# Patient Record
Sex: MALE | Race: WHITE | NOT HISPANIC OR LATINO | Employment: OTHER | RURAL
[De-identification: names, ages, dates, MRNs, and addresses within clinical notes are randomized per-mention and may not be internally consistent; named-entity substitution may affect disease eponyms.]

---

## 2020-06-17 ENCOUNTER — OFFICE VISIT (OUTPATIENT)
Dept: FAMILY MEDICINE CLINIC | Facility: CLINIC | Age: 70
End: 2020-06-17

## 2020-06-17 VITALS
OXYGEN SATURATION: 97 % | WEIGHT: 179.6 LBS | SYSTOLIC BLOOD PRESSURE: 178 MMHG | TEMPERATURE: 98.4 F | HEIGHT: 72 IN | DIASTOLIC BLOOD PRESSURE: 96 MMHG | RESPIRATION RATE: 18 BRPM | HEART RATE: 71 BPM | BODY MASS INDEX: 24.33 KG/M2

## 2020-06-17 DIAGNOSIS — E78.2 MIXED HYPERLIPIDEMIA: ICD-10-CM

## 2020-06-17 DIAGNOSIS — I10 ESSENTIAL HYPERTENSION: Primary | ICD-10-CM

## 2020-06-17 PROBLEM — E66.3 OVERWEIGHT: Status: RESOLVED | Noted: 2018-11-20 | Resolved: 2020-06-17

## 2020-06-17 PROBLEM — E78.5 HYPERLIPIDEMIA: Status: ACTIVE | Noted: 2019-02-27

## 2020-06-17 PROBLEM — J30.9 ALLERGIC RHINITIS: Status: ACTIVE | Noted: 2020-06-17

## 2020-06-17 PROBLEM — N41.1 PROSTATITIS, CHRONIC: Status: ACTIVE | Noted: 2020-06-17

## 2020-06-17 PROBLEM — E66.3 OVERWEIGHT: Status: ACTIVE | Noted: 2018-11-20

## 2020-06-17 PROCEDURE — 99213 OFFICE O/P EST LOW 20 MIN: CPT | Performed by: FAMILY MEDICINE

## 2020-06-17 RX ORDER — ATORVASTATIN CALCIUM 40 MG/1
TABLET, FILM COATED ORAL EVERY 24 HOURS
COMMUNITY
Start: 2019-03-15 | End: 2020-06-21

## 2020-06-17 RX ORDER — ATENOLOL 25 MG/1
TABLET ORAL
Qty: 45 TABLET | Refills: 1 | Status: SHIPPED | OUTPATIENT
Start: 2020-06-17 | End: 2020-12-21

## 2020-06-17 RX ORDER — ATENOLOL 25 MG/1
TABLET ORAL
COMMUNITY
Start: 2014-05-29 | End: 2020-06-17 | Stop reason: SDUPTHER

## 2020-06-17 NOTE — PROGRESS NOTES
Subjective   Emanuel Alcantara is a 69 y.o. male.     Chief Complaint   Patient presents with   • Hypertension   • Hyperlipidemia       Hypertension   This is a chronic problem. The current episode started more than 1 year ago. The problem is uncontrolled. Associated symptoms include chest pain. Pertinent negatives include no anxiety, neck pain, palpitations or shortness of breath. Risk factors for coronary artery disease include family history, dyslipidemia and male gender. There are no compliance problems.    Hyperlipidemia   This is a chronic problem. The current episode started more than 1 year ago. He has no history of diabetes, hypothyroidism or obesity. Associated symptoms include chest pain. Pertinent negatives include no leg pain or shortness of breath. Risk factors for coronary artery disease include dyslipidemia, hypertension and male sex.            I personally reviewed and updated the patient's allergies, medications, problem list, and past medical, surgical, social, and family history.     Family History   Problem Relation Age of Onset   • Hypertension Mother    • Osteoporosis Mother    • Glaucoma Mother    • Macular degeneration Mother    • Heart failure Father    • Hypertension Father    • Obesity Father    • Hypertension Sister    • Stomach cancer Maternal Grandfather    • Diabetes Paternal Grandmother    • Pancreatic cancer Paternal Grandmother    • Aneurysm Paternal Grandfather        Social History     Tobacco Use   • Smoking status: Never Smoker   • Smokeless tobacco: Never Used   Substance Use Topics   • Alcohol use: Yes   • Drug use: Never       History reviewed. No pertinent surgical history.    Patient Active Problem List   Diagnosis   • Allergic rhinitis   • Benign prostatic hyperplasia   • Hyperlipidemia   • Hypertension   • Osteoarthritis   • Prostatitis, chronic   • Encounter for screening for malignant neoplasm of prostate         Current Outpatient Medications:   •  atenolol (TENORMIN)  "25 MG tablet, Take 1/2 tablet daily, Disp: 45 tablet, Rfl: 1  •  atorvastatin (LIPITOR) 40 MG tablet, Daily., Disp: , Rfl:          Review of Systems   Constitutional: Negative for chills and diaphoresis.   Eyes: Negative for visual disturbance.   Respiratory: Negative for shortness of breath.    Cardiovascular: Positive for chest pain. Negative for palpitations.   Gastrointestinal: Negative for abdominal pain and nausea.   Endocrine: Negative for polydipsia and polyphagia.   Musculoskeletal: Negative for neck pain and neck stiffness.   Skin: Negative for color change and pallor.   Neurological: Negative for seizures and syncope.   Hematological: Negative for adenopathy.       I have reviewed and confirmed the accuracy of the ROS as documented by the MA/LPN/RN Monty Henry MD      Objective   /96 (BP Location: Right arm, Patient Position: Sitting, Cuff Size: Adult)   Pulse 71   Temp 98.4 °F (36.9 °C)   Resp 18   Ht 182.9 cm (72\")   Wt 81.5 kg (179 lb 9.6 oz)   SpO2 97%   BMI 24.36 kg/m²   BP Readings from Last 3 Encounters:   06/17/20 178/96   03/15/19 164/97   11/20/18 163/93     Wt Readings from Last 3 Encounters:   06/17/20 81.5 kg (179 lb 9.6 oz)   03/15/19 82.9 kg (182 lb 12.8 oz)   11/20/18 84.8 kg (187 lb)     Physical Exam   Constitutional: He is oriented to person, place, and time. He appears well-developed and well-nourished.   Cardiovascular: Normal rate, regular rhythm, S1 normal, S2 normal, normal heart sounds, intact distal pulses and normal pulses. Exam reveals no gallop and no friction rub.   No murmur heard.  Pulmonary/Chest: Effort normal and breath sounds normal. No accessory muscle usage or stridor. He has no decreased breath sounds. He has no wheezes. He has no rhonchi. He has no rales.   Abdominal: Soft. Normal appearance, normal aorta and bowel sounds are normal. He exhibits no distension, no pulsatile midline mass and no mass. There is no hepatosplenomegaly. There is no " tenderness. There is no rigidity, no rebound, no guarding, no CVA tenderness and negative Castillo's sign. No hernia.   Neurological: He is alert and oriented to person, place, and time. Coordination and gait normal.   Skin: Skin is warm and dry. Turgor is normal. He is not diaphoretic. No pallor.         Assessment/Plan      Medications        Problem List         LOS    Hypertension.  Home values reviewed, has been doing well on atenolol 12.5 mg daily, continue Rx.  Palpitations.  History of, much improved on atenolol.  Limit caffeine.  Hyperlipidemia.  He stopped statin.  Discussed diet and exercise colitis to modification.  Recheck fasting labs.  Direct inguinal hernia.  Small/nontender, asymptomatic currently.  Continue to monitor clinically.  Consider surgery referral if worsening symptoms.  Asymmetric abdominal fat.  He is concerned about a prominence of his abdominal wall which is a symmetric.  Benign exam today, asymptomatic.  Reassurance given.  Has had CT abdomen will get records.  Follow-up recheck.  Consider repeat imaging, surgery referral if worsening symptoms.      Problem List Items Addressed This Visit        Unprioritized    Hyperlipidemia    Relevant Medications    atorvastatin (LIPITOR) 40 MG tablet    Hypertension - Primary    Relevant Medications    atenolol (TENORMIN) 25 MG tablet              Expected course, medications, and adverse effects discussed.  Call or return if worsening or persistent symptoms.

## 2020-09-15 ENCOUNTER — TELEPHONE (OUTPATIENT)
Dept: FAMILY MEDICINE CLINIC | Facility: CLINIC | Age: 70
End: 2020-09-15

## 2020-09-15 ENCOUNTER — OFFICE VISIT (OUTPATIENT)
Dept: FAMILY MEDICINE CLINIC | Facility: CLINIC | Age: 70
End: 2020-09-15

## 2020-09-15 VITALS
TEMPERATURE: 97.3 F | RESPIRATION RATE: 18 BRPM | BODY MASS INDEX: 24.95 KG/M2 | DIASTOLIC BLOOD PRESSURE: 89 MMHG | HEIGHT: 72 IN | WEIGHT: 184.2 LBS | OXYGEN SATURATION: 98 % | HEART RATE: 77 BPM | SYSTOLIC BLOOD PRESSURE: 153 MMHG

## 2020-09-15 DIAGNOSIS — N48.1 BALANITIS: ICD-10-CM

## 2020-09-15 DIAGNOSIS — K40.90 DIRECT INGUINAL HERNIA: ICD-10-CM

## 2020-09-15 DIAGNOSIS — N40.1 BENIGN PROSTATIC HYPERPLASIA WITH URINARY FREQUENCY: ICD-10-CM

## 2020-09-15 DIAGNOSIS — E78.2 MIXED HYPERLIPIDEMIA: ICD-10-CM

## 2020-09-15 DIAGNOSIS — I10 ESSENTIAL HYPERTENSION: ICD-10-CM

## 2020-09-15 DIAGNOSIS — Z00.00 MEDICARE ANNUAL WELLNESS VISIT, SUBSEQUENT: Primary | ICD-10-CM

## 2020-09-15 DIAGNOSIS — R35.0 BENIGN PROSTATIC HYPERPLASIA WITH URINARY FREQUENCY: ICD-10-CM

## 2020-09-15 PROCEDURE — G0439 PPPS, SUBSEQ VISIT: HCPCS | Performed by: FAMILY MEDICINE

## 2020-09-15 PROCEDURE — 99214 OFFICE O/P EST MOD 30 MIN: CPT | Performed by: FAMILY MEDICINE

## 2020-09-15 RX ORDER — CLOTRIMAZOLE AND BETAMETHASONE DIPROPIONATE 10; .64 MG/G; MG/G
CREAM TOPICAL
Qty: 45 G | Refills: 5 | Status: ON HOLD | OUTPATIENT
Start: 2020-09-15 | End: 2021-12-07

## 2020-09-15 RX ORDER — METRONIDAZOLE 10 MG/G
GEL TOPICAL 2 TIMES DAILY
Qty: 60 G | Refills: 5 | Status: SHIPPED | OUTPATIENT
Start: 2020-09-15 | End: 2020-10-15

## 2020-09-15 RX ORDER — ROSUVASTATIN CALCIUM 5 MG/1
5 TABLET, COATED ORAL DAILY
Qty: 90 TABLET | Refills: 3 | Status: SHIPPED | OUTPATIENT
Start: 2020-09-15 | End: 2021-03-16

## 2020-09-15 NOTE — TELEPHONE ENCOUNTER
Patient states the pharmacy informed him the metrogel is not covered under his insurance. Can this be changed?

## 2020-09-15 NOTE — PROGRESS NOTES
Subjective   Emanuel Alcantara is a 69 y.o. male.     Chief Complaint   Patient presents with   • Medicare Wellness-subsequent   • Hyperlipidemia   • Hypertension       The patient is here: for coordination of medical care to discuss health maintenance and disease prevention to follow up on multiple medical problems.  Last comprehensive physical was on 2018.  Patient's previous physician was [facility].  Overall has: moderate activity with work/home activities, exercises 2 - 3 times per week, good appetite, feels well with no complaints, good energy level and is sleeping well. Nutrition: appropriate balanced diet, balanced diet and eating a variety of foods. Last tetanus shot was 10/27/2016. Patient's last colonoscopy was: 3/17/2005. Patients last stress test 11/7/2001. Patients last echo 12/11/2001.    History of Present Illness     Recent Hospitalizations:  No hospitalization(s) within the last year..  ccc    I personally reviewed and updated the patient's allergies, medications, problem list, and past medical, surgical, social, and family history. I have reviewed and confirmed the accuracy of the HPI and ROS as documented by the MA/LPN/RN Monty Henry MD      Family History   Problem Relation Age of Onset   • Hypertension Mother    • Osteoporosis Mother    • Glaucoma Mother    • Macular degeneration Mother    • Heart failure Father    • Hypertension Father    • Obesity Father    • Hypertension Sister    • Stomach cancer Maternal Grandfather    • Diabetes Paternal Grandmother    • Pancreatic cancer Paternal Grandmother    • Aneurysm Paternal Grandfather        Social History     Tobacco Use   • Smoking status: Never Smoker   • Smokeless tobacco: Never Used   Substance Use Topics   • Alcohol use: Yes   • Drug use: Never       History reviewed. No pertinent surgical history.    Patient Active Problem List   Diagnosis   • Allergic rhinitis   • Benign prostatic hyperplasia   • Hyperlipidemia   • Hypertension   •  "Osteoarthritis   • Prostatitis, chronic   • Encounter for screening for malignant neoplasm of prostate   • Balanitis   • Direct inguinal hernia         Current Outpatient Medications:   •  atenolol (TENORMIN) 25 MG tablet, Take 1/2 tablet daily, Disp: 45 tablet, Rfl: 1  •  clotrimazole-betamethasone (Lotrisone) 1-0.05 % cream, Apply topically twice daily as needed, Disp: 45 g, Rfl: 5  •  metroNIDAZOLE (Metrogel) 1 % gel, Apply  topically to the appropriate area as directed 2 (Two) Times a Day for 30 days. Avoid contact with eyes., Disp: 60 g, Rfl: 5  •  rosuvastatin (Crestor) 5 MG tablet, Take 1 tablet by mouth Daily., Disp: 90 tablet, Rfl: 3         Review of Systems   Constitutional: Negative for chills and diaphoresis.   HENT: Negative for trouble swallowing and voice change.    Eyes: Negative for visual disturbance.   Respiratory: Negative for shortness of breath.    Cardiovascular: Negative for chest pain and palpitations.   Gastrointestinal: Negative for abdominal pain and nausea.   Endocrine: Negative for polydipsia and polyphagia.   Genitourinary: Negative for hematuria.   Musculoskeletal: Negative for neck stiffness.   Skin: Negative for color change and pallor.   Allergic/Immunologic: Negative for immunocompromised state.   Neurological: Negative for seizures and syncope.   Hematological: Negative for adenopathy.   Psychiatric/Behavioral: Negative for hallucinations, sleep disturbance and suicidal ideas.       I have reviewed and confirmed the accuracy of the ROS as documented by the MA/LPN/RN Monty Henry MD      Objective   /89 (BP Location: Right arm, Patient Position: Sitting, Cuff Size: Adult)   Pulse 77   Temp 97.3 °F (36.3 °C)   Resp 18   Ht 182.9 cm (72\")   Wt 83.6 kg (184 lb 3.2 oz)   SpO2 98%   BMI 24.98 kg/m²   BP Readings from Last 3 Encounters:   09/15/20 153/89   06/17/20 178/96   03/15/19 164/97     Wt Readings from Last 3 Encounters:   09/15/20 83.6 kg (184 lb 3.2 oz) "   06/17/20 81.5 kg (179 lb 9.6 oz)   03/15/19 82.9 kg (182 lb 12.8 oz)     Physical Exam  Constitutional:       Appearance: He is well-developed. He is not diaphoretic.   HENT:      Head: Normocephalic.      Right Ear: Tympanic membrane, ear canal and external ear normal.      Left Ear: Tympanic membrane, ear canal and external ear normal.      Nose: Nose normal.   Eyes:      General: Lids are normal.      Conjunctiva/sclera: Conjunctivae normal.      Pupils: Pupils are equal, round, and reactive to light.   Neck:      Thyroid: No thyromegaly.      Vascular: No carotid bruit or JVD.      Trachea: No tracheal deviation.   Cardiovascular:      Rate and Rhythm: Normal rate and regular rhythm.      Heart sounds: Normal heart sounds. No murmur. No friction rub. No gallop.    Pulmonary:      Effort: Pulmonary effort is normal.      Breath sounds: Normal breath sounds. No stridor. No decreased breath sounds, wheezing or rales.   Abdominal:      General: Bowel sounds are normal. There is no distension.      Palpations: Abdomen is soft. There is no mass.      Tenderness: There is no abdominal tenderness. There is no guarding or rebound.      Hernia: No hernia is present. There is no hernia in the left inguinal area.   Genitourinary:     Penis: Normal.       Scrotum/Testes: Normal.         Right: Mass, tenderness or swelling not present.         Left: Mass, tenderness or swelling not present.      Comments: Positive mild balanitis  Lymphadenopathy:      Head:      Right side of head: No submental, submandibular, tonsillar, preauricular, posterior auricular or occipital adenopathy.      Left side of head: No submental, submandibular, tonsillar, preauricular, posterior auricular or occipital adenopathy.      Cervical: No cervical adenopathy.      Lower Body: No right inguinal adenopathy. No left inguinal adenopathy.   Skin:     General: Skin is warm and dry.      Coloration: Skin is not pale.   Neurological:      Mental Status:  He is alert and oriented to person, place, and time.      Cranial Nerves: No cranial nerve deficit.      Sensory: No sensory deficit.      Coordination: Coordination normal.      Gait: Gait normal.      Deep Tendon Reflexes: Reflexes are normal and symmetric.         Recent Lab Results:    Lab Results   Component Value Date    GLU 92 09/01/2020        Lab Results   Component Value Date    CHOL 232 (H) 03/08/2019    CHOL 227 (H) 11/26/2018    CHOL 214 (H) 06/17/2016    CHLPL 226 (H) 09/01/2020     Lab Results   Component Value Date    TRIG 76 09/01/2020    TRIG 82 03/08/2019    TRIG 95 11/26/2018     Lab Results   Component Value Date    HDL 47 09/01/2020    HDL 39 (L) 03/08/2019    HDL 45 11/26/2018     Lab Results   Component Value Date     (H) 09/01/2020     MG/DL (CALC) (H) 03/08/2019     MG/DL (CALC) (H) 11/26/2018     Lab Results   Component Value Date    PSA 1.2 09/01/2020    PSA 1.0 11/26/2018    PSA 0.9 06/17/2016     Lab Results   Component Value Date    WBC 6.1 09/01/2020    HGB 14.2 09/01/2020    HCT 42.5 09/01/2020    MCV 90 09/01/2020     09/01/2020     Lab Results   Component Value Date    TSH 4.520 (H) 09/01/2020     Lab Results   Component Value Date    GLUCOSE 93 11/26/2018    BUN 13 09/01/2020    CREATININE 0.98 09/01/2020    EGFRIFNONA 79 09/01/2020    EGFRIFAFRI 92 09/01/2020    BCR 13 09/01/2020    K 4.3 09/01/2020    CO2 24 09/01/2020    CALCIUM 9.6 09/01/2020    PROTENTOTREF 7.4 09/01/2020    ALBUMIN 4.6 09/01/2020    LABIL2 1.6 09/01/2020    AST 31 09/01/2020    ALT 23 09/01/2020         Age-appropriate Screening Schedule:  Refer to the list below for future screening recommendations based on patient's age, sex and/or medical conditions. Orders for these recommended tests are listed in the plan section. The patient has been provided with a written plan.    Health Maintenance   Topic Date Due   • ZOSTER VACCINE (1 of 2) 12/10/2002   • INFLUENZA VACCINE  08/01/2020    • LIPID PANEL  09/01/2021   • COLONOSCOPY  01/01/2025   • TDAP/TD VACCINES (3 - Td) 10/27/2026       Depression Screen:   PHQ-2/PHQ-9 Depression Screening 9/15/2020   Little interest or pleasure in doing things 0   Feeling down, depressed, or hopeless 0   Trouble falling or staying asleep, or sleeping too much 0   Feeling tired or having little energy 0   Poor appetite or overeating 0   Feeling bad about yourself - or that you are a failure or have let yourself or your family down 0   Trouble concentrating on things, such as reading the newspaper or watching television 0   Moving or speaking so slowly that other people could have noticed. Or the opposite - being so fidgety or restless that you have been moving around a lot more than usual 0   Thoughts that you would be better off dead, or of hurting yourself in some way 0   Total Score 0   If you checked off any problems, how difficult have these problems made it for you to do your work, take care of things at home, or get along with other people? Not difficult at all     I spent more than 16 minutes asking patient questions, counseling and documenting in the chart.    Health Habits and Functional and Cognitive Screening:  Functional & Cognitive Status 9/15/2020   Do you have difficulty preparing food and eating? No   Do you have difficulty bathing yourself, getting dressed or grooming yourself? No   Do you have difficulty using the toilet? No   Do you have difficulty moving around from place to place? No   Do you have trouble with steps or getting out of a bed or a chair? No   Current Diet Well Balanced Diet   Dental Exam Up to date   Eye Exam Not up to date   Exercise (times per week) 5 times per week   Current Exercise Activities Include Walking   Do you need help using the phone?  No   Are you deaf or do you have serious difficulty hearing?  No   Do you need help with transportation? No   Do you need help shopping? No   Do you need help preparing meals?  No    Do you need help with housework?  No   Do you need help with laundry? No   Do you need help taking your medications? No   Do you need help managing money? No   Do you ever drive or ride in a car without wearing a seat belt? No   Have you felt unusual stress, anger or loneliness in the last month? No   Who do you live with? Spouse   If you need help, do you have trouble finding someone available to you? No   Have you been bothered in the last four weeks by sexual problems? No   Do you have difficulty concentrating, remembering or making decisions? No       Does the patient have evidence of cognitive impairment? No    Advance Care Planning:  ACP discussion was held with the patient during this visit. Patient does not have an advance directive, information provided.     A face-to-face visit was completed today with patient.  Counseling explanation, and discussion of advanced directives was performed.   The last advanced care visit was performed in 2018.  In a near life ending situation, from which he is not expected to recover functionally, and he is not able to speak for him, he does not want life sustaining measures. We discussed feeding tubes, ventilators and cardiac support as well as dialysis.    I spent more than 16 minutes discussing Advanced Care Planning information and documenting in the chart.    Identification of Risk Factors:  Risk factors include: Advance Directive Discussion  Cardiovascular risk  Colon Cancer Screening  Immunizations Discussed/Encouraged (specific immunizations; Prevnar ).    Compared to one year ago, the patient feels his physical health is the same.  Compared to one year ago, the patient feels his mental health is the same.    Patient Self-Management and Personalized Health Advice  New so the role that the patient has been provided with information about: diet, exercise, prevention of cardiac or vascular disease, designing advance directives and supplements and preventive services  including:   · Alcohol Misuse Screening and Counseling  (15 minutes counseling time, Code )  · Annual Wellness Visit (AWV)  · Colorectal Cancer Screening, Colonoscopy  · Depression Screening (15 minutes face to face, Code )  · Influenza Vaccine and Administration  · Pneumococcal Vaccine and Administration.      Assessment/Plan      Medications        Problem List         LOS    Medicare wellness.  Doing well.  Recommend 23 valent Pneumovax he states he will consider.  Coated baby aspirin daily.  Discussed health maintenance, screening test, lifestyle modification.  Hypertension.    Good control.   Home values reviewed, has been doing well on atenolol 12.5 mg daily, continue Rx.  Remote history of benign cardiac stress testing, recommend repeat stress echo, carotid Dopplers he states he will consider.    Palpitations.  History of, much improved on atenolol.  Limit caffeine.  Hyperlipidemia.  He stopped statin.  Discussed diet and exercise colitis to modification. , recommend start Crestor 5 mg daily, he states he will consider, recheck blood work 6 months.  Discussed diet, exercise, lifestyle modification.   Direct inguinal hernia.  Small/nontender, asymptomatic currently.  Continue to monitor clinically.  Consider surgery referral if worsening symptoms.  Asymmetric abdominal fat.  He is concerned about a prominence of his abdominal wall which is a symmetric.  Benign exam today, asymptomatic.  Reassurance given.  Has had CT abdomen will get records.  Follow-up recheck.  Consider repeat imaging, surgery referral if worsening symptoms.  Balanitis.  Start topical Rx.  Call return if persistent symptoms.  Colon screening.  Has had colonoscopy 2015, repeat was recommended in 10 years, will get the records.    Problem List Items Addressed This Visit        Unprioritized    Benign prostatic hyperplasia    Hyperlipidemia    Relevant Medications    rosuvastatin (Crestor) 5 MG tablet    Hypertension     Balanitis    Direct inguinal hernia      Other Visit Diagnoses     Medicare annual wellness visit, subsequent    -  Primary    Relevant Orders    POC Urinalysis Dipstick, Automated              Expected course, medications, and adverse effects discussed.  Call or return if worsening or persistent symptoms.         I wore protective equipment throughout this patient encounter to include mask, gloves and eye protection. Hand hygiene was performed before donning protective equipment and after removal when leaving the room.

## 2020-09-16 ENCOUNTER — TELEPHONE (OUTPATIENT)
Dept: FAMILY MEDICINE CLINIC | Facility: CLINIC | Age: 70
End: 2020-09-16

## 2020-09-16 NOTE — TELEPHONE ENCOUNTER
He stated that he left a message but has not heard back. He has a question about a prescription. Please call him at [phone]

## 2020-09-18 NOTE — TELEPHONE ENCOUNTER
Called and discussed with patient and he states he wanted to check with the pharmacy. He states his mother is with hospice at the moment so he will give us a call back once he talks to the pharmacy.

## 2020-09-20 ENCOUNTER — TELEPHONE (OUTPATIENT)
Dept: FAMILY MEDICINE CLINIC | Facility: CLINIC | Age: 70
End: 2020-09-20

## 2020-09-20 PROBLEM — K40.90 DIRECT INGUINAL HERNIA: Status: ACTIVE | Noted: 2020-09-20

## 2020-09-20 PROBLEM — N48.1 BALANITIS: Status: ACTIVE | Noted: 2020-09-20

## 2020-12-21 RX ORDER — ATENOLOL 25 MG/1
TABLET ORAL
Qty: 45 TABLET | Refills: 0 | Status: SHIPPED | OUTPATIENT
Start: 2020-12-21 | End: 2021-03-16

## 2021-03-15 PROBLEM — I10 HYPERTENSION: Status: RESOLVED | Noted: 2020-06-17 | Resolved: 2021-03-15

## 2021-03-15 PROBLEM — E78.2 MIXED HYPERLIPIDEMIA: Status: ACTIVE | Noted: 2019-02-27

## 2021-03-15 NOTE — PROGRESS NOTES
Subjective   Emanuel Alcantara is a 70 y.o. male.     Chief Complaint   Patient presents with   • Hyperlipidemia   • Hypertension       Hypertension  This is a chronic problem. The current episode started more than 1 year ago. The problem is uncontrolled. Pertinent negatives include no anxiety, chest pain, headaches, neck pain, palpitations or shortness of breath. Risk factors for coronary artery disease include family history, dyslipidemia and male gender. Current antihypertension treatment includes beta blockers. There are no compliance problems.    Hyperlipidemia  This is a chronic problem. The current episode started more than 1 year ago. He has no history of diabetes, hypothyroidism or obesity. Pertinent negatives include no chest pain, leg pain or shortness of breath. Current antihyperlipidemic treatment includes statins. The current treatment provides moderate improvement of lipids. Risk factors for coronary artery disease include dyslipidemia, hypertension and male sex.            I personally reviewed and updated the patient's allergies, medications, problem list, and past medical, surgical, social, and family history. I have reviewed and confirmed the accuracy of the History of Present Illness and Review of Symptoms as documented by the MA/LPN/RN. Monty Henry MD    Family History   Problem Relation Age of Onset   • Hypertension Mother    • Osteoporosis Mother    • Glaucoma Mother    • Macular degeneration Mother    • Heart failure Father    • Hypertension Father    • Obesity Father    • Hypertension Sister    • Stomach cancer Maternal Grandfather    • Diabetes Paternal Grandmother    • Pancreatic cancer Paternal Grandmother    • Aneurysm Paternal Grandfather        Social History     Tobacco Use   • Smoking status: Never Smoker   • Smokeless tobacco: Never Used   Vaping Use   • Vaping Use: Never used   Substance Use Topics   • Alcohol use: Yes   • Drug use: Never       History reviewed. No pertinent  "surgical history.    Patient Active Problem List   Diagnosis   • Allergic rhinitis   • Benign prostatic hyperplasia   • Mixed hyperlipidemia   • Essential hypertension   • Osteoarthritis   • Prostatitis, chronic   • Encounter for screening for malignant neoplasm of prostate   • Balanitis   • Direct inguinal hernia         Current Outpatient Medications:   •  atenolol (TENORMIN) 25 MG tablet, Take 1 tablet by mouth Daily., Disp: 90 tablet, Rfl: 3  •  clotrimazole-betamethasone (Lotrisone) 1-0.05 % cream, Apply topically twice daily as needed, Disp: 45 g, Rfl: 5         Review of Systems   Constitutional: Negative for chills and diaphoresis.   Eyes: Negative for visual disturbance.   Respiratory: Negative for shortness of breath.    Cardiovascular: Negative for chest pain and palpitations.   Gastrointestinal: Negative for abdominal pain and nausea.   Endocrine: Negative for polydipsia and polyphagia.   Musculoskeletal: Negative for neck pain and neck stiffness.   Skin: Negative for color change and pallor.   Neurological: Negative for seizures and syncope.   Hematological: Negative for adenopathy.   Psychiatric/Behavioral: Negative for hallucinations and suicidal ideas.       I have reviewed and confirmed the accuracy of the ROS as documented by the MA/LPN/RN Monty Henry MD      Objective   /85   Pulse 78   Temp 98 °F (36.7 °C)   Resp 18   Ht 182.9 cm (72\")   Wt 85.4 kg (188 lb 3.2 oz)   SpO2 100%   BMI 25.52 kg/m²   BP Readings from Last 3 Encounters:   03/16/21 160/85   09/15/20 153/89   06/17/20 178/96     Wt Readings from Last 3 Encounters:   03/16/21 85.4 kg (188 lb 3.2 oz)   09/15/20 83.6 kg (184 lb 3.2 oz)   06/17/20 81.5 kg (179 lb 9.6 oz)     Physical Exam  Constitutional:       Appearance: Normal appearance. He is well-developed. He is not diaphoretic.   Cardiovascular:      Rate and Rhythm: Normal rate and regular rhythm.      Pulses: Normal pulses.      Heart sounds: Normal heart sounds, S1 " normal and S2 normal. No murmur. No friction rub. No gallop.    Pulmonary:      Effort: Pulmonary effort is normal. No accessory muscle usage.      Breath sounds: Normal breath sounds. No stridor. No decreased breath sounds, wheezing, rhonchi or rales.   Abdominal:      General: Bowel sounds are normal. There is no distension.      Palpations: Abdomen is soft. Abdomen is not rigid. There is no mass or pulsatile mass.      Tenderness: There is no abdominal tenderness. There is no guarding or rebound. Negative signs include Castillo's sign.      Hernia: No hernia is present.   Skin:     General: Skin is warm and dry.      Coloration: Skin is not pale.   Neurological:      Mental Status: He is alert and oriented to person, place, and time.      Coordination: Coordination normal.      Gait: Gait normal.         Data / Lab Results:    No results found for: HGBA1C  Lab Results   Component Value Date     (H) 03/08/2021     Lab Results   Component Value Date     (H) 03/08/2021     (H) 09/01/2020     MG/DL (CALC) (H) 03/08/2019     Lab Results   Component Value Date    CHOL 232 (H) 03/08/2019    CHOL 227 (H) 11/26/2018    CHOL 214 (H) 06/17/2016     Lab Results   Component Value Date    TRIG 91 03/08/2021    TRIG 76 09/01/2020    TRIG 82 03/08/2019     Lab Results   Component Value Date    HDL 47 03/08/2021    HDL 47 09/01/2020    HDL 39 (L) 03/08/2019     Lab Results   Component Value Date    PSA 1.2 09/01/2020    PSA 1.0 11/26/2018    PSA 0.9 06/17/2016     Lab Results   Component Value Date    WBC 6.1 09/01/2020    HGB 14.2 09/01/2020    HCT 42.5 09/01/2020    MCV 90 09/01/2020     09/01/2020     Lab Results   Component Value Date    TSH 4.520 (H) 09/01/2020      Lab Results   Component Value Date    GLUCOSE 93 11/26/2018    BUN 13 03/08/2021    CREATININE 0.89 03/08/2021    EGFRIFNONA 88 03/08/2021    EGFRIFAFRI 102 03/08/2021    BCR 15 03/08/2021    K 4.2 03/08/2021    CO2 19 (L)  03/08/2021    CALCIUM 9.5 03/08/2021    PROTENTOTREF 7.6 03/08/2021    ALBUMIN 4.6 03/08/2021    LABIL2 1.5 03/08/2021    AST 36 03/08/2021    ALT 31 03/08/2021     No results found for: LEXY, RF, SEDRATE   No results found for: CRP   No results found for: IRON, TIBC, FERRITIN   No results found for: [id number]       Assessment/Plan      Medications        Problem List         LOS      Health maintenance. Doing well.  Recommend 23 valent Pneumovax he states he will consider.  Coated baby aspirin daily.  Discussed health maintenance, screening test, lifestyle modification.  Hypertension.  Worse today, increase atenolol to 25 mg daily.  Follow-up recheck in 4 to 6 weeks.  Home values reviewed, has been doing well on atenolol 12.5 mg daily, continue Rx.  Remote history of benign cardiac stress testing, recommend repeat stress echo, carotid Dopplers he states he will consider.    Palpitations.  History of, much improved on atenolol.  Limit caffeine.  Hyperlipidemia.  He stopped statin.  Discussed diet and exercise lifestyle to modification.   Persistent elevation , recommend start Crestor 5 mg every 2 to 3 days,, he states he will consider, recheck blood work 6 months.  Discussed diet, exercise, lifestyle modification.   Direct inguinal hernia.  Small/nontender, asymptomatic currently.  Continue to monitor clinically.  Consider surgery referral if worsening symptoms.  Asymmetric abdominal fat.  He is concerned about a prominence of his abdominal wall which is a symmetric.  Benign exam today, asymptomatic.  Reassurance given.  Has had CT abdomen will get records.  Follow-up recheck.  Consider repeat imaging, surgery referral if worsening symptoms.  Balanitis.  Start topical Rx.  Call return if persistent symptoms.  Colon screening.  Has had colonoscopy 2015, repeat was recommended in 10 years, will get the records.        Diagnoses and all orders for this visit:    1. Mixed hyperlipidemia (Primary)    2. Essential  hypertension  -     Discontinue: atenolol (TENORMIN) 25 MG tablet; Take 1 tablet by mouth Daily. Take 1/2 (one-half) tablet by mouth once daily  Dispense: 90 tablet; Refill: 3  -     atenolol (TENORMIN) 25 MG tablet; Take 1 tablet by mouth Daily.  Dispense: 90 tablet; Refill: 3              Expected course, medications, and adverse effects discussed.  Call or return if worsening or persistent symptoms.  I wore protective equipment throughout this patient encounter including a mask, gloves, and eye protection.  Hand hygiene was performed before donning protective equipment and after removal when leaving the room. The complete contents of the Assessment and Plan and Data/Lab Results as documented above have been reviewed and addressed by myself with the patient today as part of an ongoing evaluation / treatment plan.  If some of the documentation has been copied from a previous note and is unchanged it indicates that this problem / plan has been assessed today but is stable from a previous visit and no changes have been recommended.

## 2021-03-16 ENCOUNTER — OFFICE VISIT (OUTPATIENT)
Dept: FAMILY MEDICINE CLINIC | Facility: CLINIC | Age: 71
End: 2021-03-16

## 2021-03-16 VITALS
DIASTOLIC BLOOD PRESSURE: 85 MMHG | BODY MASS INDEX: 25.49 KG/M2 | WEIGHT: 188.2 LBS | HEART RATE: 78 BPM | TEMPERATURE: 98 F | OXYGEN SATURATION: 100 % | HEIGHT: 72 IN | RESPIRATION RATE: 18 BRPM | SYSTOLIC BLOOD PRESSURE: 160 MMHG

## 2021-03-16 DIAGNOSIS — E78.2 MIXED HYPERLIPIDEMIA: Primary | ICD-10-CM

## 2021-03-16 DIAGNOSIS — I10 ESSENTIAL HYPERTENSION: ICD-10-CM

## 2021-03-16 PROCEDURE — 99214 OFFICE O/P EST MOD 30 MIN: CPT | Performed by: FAMILY MEDICINE

## 2021-03-16 RX ORDER — ATENOLOL 25 MG/1
25 TABLET ORAL DAILY
Qty: 90 TABLET | Refills: 3 | Status: SHIPPED | OUTPATIENT
Start: 2021-03-16 | End: 2021-03-16

## 2021-03-16 RX ORDER — ATENOLOL 25 MG/1
25 TABLET ORAL DAILY
Qty: 90 TABLET | Refills: 3 | Status: SHIPPED | OUTPATIENT
Start: 2021-03-16 | End: 2021-03-17 | Stop reason: SDUPTHER

## 2021-03-17 DIAGNOSIS — I10 ESSENTIAL HYPERTENSION: ICD-10-CM

## 2021-03-17 RX ORDER — ATENOLOL 25 MG/1
25 TABLET ORAL DAILY
Qty: 90 TABLET | Refills: 3 | Status: SHIPPED | OUTPATIENT
Start: 2021-03-17 | End: 2022-01-18 | Stop reason: SDUPTHER

## 2021-04-19 NOTE — PROGRESS NOTES
Subjective   Emanuel Alcantara is a 70 y.o. male.     Chief Complaint   Patient presents with   • Hypertension   • Hyperlipidemia       Hypertension  This is a chronic problem. The current episode started more than 1 year ago. The problem is uncontrolled. Pertinent negatives include no anxiety, chest pain, headaches, neck pain, palpitations or shortness of breath. Risk factors for coronary artery disease include family history, dyslipidemia and male gender. Current antihypertension treatment includes beta blockers. There are no compliance problems.    Hyperlipidemia  This is a chronic problem. The current episode started more than 1 year ago. He has no history of diabetes, hypothyroidism or obesity. Pertinent negatives include no chest pain, leg pain or shortness of breath. Current antihyperlipidemic treatment includes statins. The current treatment provides moderate improvement of lipids. Risk factors for coronary artery disease include dyslipidemia, hypertension and male sex.            I personally reviewed and updated the patient's allergies, medications, problem list, and past medical, surgical, social, and family history. I have reviewed and confirmed the accuracy of the History of Present Illness and Review of Symptoms as documented by the MA/LPN/RN. Monty Henry MD    Family History   Problem Relation Age of Onset   • Hypertension Mother    • Osteoporosis Mother    • Glaucoma Mother    • Macular degeneration Mother    • Heart failure Father    • Hypertension Father    • Obesity Father    • Hypertension Sister    • Stomach cancer Maternal Grandfather    • Diabetes Paternal Grandmother    • Pancreatic cancer Paternal Grandmother    • Aneurysm Paternal Grandfather        Social History     Tobacco Use   • Smoking status: Never Smoker   • Smokeless tobacco: Never Used   Vaping Use   • Vaping Use: Never used   Substance Use Topics   • Alcohol use: Yes   • Drug use: Never       History reviewed. No pertinent  "surgical history.    Patient Active Problem List   Diagnosis   • Allergic rhinitis   • Benign prostatic hyperplasia   • Mixed hyperlipidemia   • Essential hypertension   • Osteoarthritis   • Prostatitis, chronic   • Encounter for screening for malignant neoplasm of prostate   • Balanitis   • Direct inguinal hernia         Current Outpatient Medications:   •  atenolol (TENORMIN) 25 MG tablet, Take 1 tablet by mouth Daily., Disp: 90 tablet, Rfl: 3  •  clotrimazole-betamethasone (Lotrisone) 1-0.05 % cream, Apply topically twice daily as needed, Disp: 45 g, Rfl: 5  •  rosuvastatin (CRESTOR) 5 MG tablet, , Disp: , Rfl:          Review of Systems   Constitutional: Negative for chills and diaphoresis.   Eyes: Negative for visual disturbance.   Respiratory: Negative for shortness of breath.    Cardiovascular: Negative for chest pain and palpitations.   Gastrointestinal: Negative for abdominal pain and nausea.   Endocrine: Negative for polydipsia and polyphagia.   Musculoskeletal: Negative for neck pain and neck stiffness.   Skin: Negative for color change and pallor.   Neurological: Negative for seizures and syncope.   Hematological: Negative for adenopathy.   Psychiatric/Behavioral: Negative for hallucinations and suicidal ideas.       I have reviewed and confirmed the accuracy of the ROS as documented by the MA/LPN/RN Monty Henry MD      Objective   /74   Ht 182.9 cm (72\")   Wt 85.3 kg (188 lb)   BMI 25.50 kg/m²   BP Readings from Last 3 Encounters:   04/20/21 127/74   03/16/21 160/85   09/15/20 153/89     Wt Readings from Last 3 Encounters:   04/20/21 85.3 kg (188 lb)   03/16/21 85.4 kg (188 lb 3.2 oz)   09/15/20 83.6 kg (184 lb 3.2 oz)     Physical Exam    Data / Lab Results:    No results found for: HGBA1C  Lab Results   Component Value Date     (H) 03/08/2021     Lab Results   Component Value Date     (H) 03/08/2021     (H) 09/01/2020     MG/DL (CALC) (H) 03/08/2019     Lab " Results   Component Value Date    CHOL 232 (H) 03/08/2019    CHOL 227 (H) 11/26/2018    CHOL 214 (H) 06/17/2016     Lab Results   Component Value Date    TRIG 91 03/08/2021    TRIG 76 09/01/2020    TRIG 82 03/08/2019     Lab Results   Component Value Date    HDL 47 03/08/2021    HDL 47 09/01/2020    HDL 39 (L) 03/08/2019     Lab Results   Component Value Date    PSA 1.2 09/01/2020    PSA 1.0 11/26/2018    PSA 0.9 06/17/2016     Lab Results   Component Value Date    WBC 6.1 09/01/2020    HGB 14.2 09/01/2020    HCT 42.5 09/01/2020    MCV 90 09/01/2020     09/01/2020     Lab Results   Component Value Date    TSH 4.520 (H) 09/01/2020      Lab Results   Component Value Date    GLUCOSE 93 11/26/2018    BUN 13 03/08/2021    CREATININE 0.89 03/08/2021    EGFRIFNONA 88 03/08/2021    EGFRIFAFRI 102 03/08/2021    BCR 15 03/08/2021    K 4.2 03/08/2021    CO2 19 (L) 03/08/2021    CALCIUM 9.5 03/08/2021    PROTENTOTREF 7.6 03/08/2021    ALBUMIN 4.6 03/08/2021    LABIL2 1.5 03/08/2021    AST 36 03/08/2021    ALT 31 03/08/2021     No results found for: LEYX, RF, SEDRATE   No results found for: CRP   No results found for: IRON, TIBC, FERRITIN   No results found for: [id number]     Emanuel JASON Stubbss consented to undergo a telephone visit today.  This format was necessitated by the current covid-19 virus pandemic.  20 minutes was spent on the phone today with Emanuel discussing his acute concerns of chronic medical problems.    Assessment/Plan      Medications        Problem List         LOS  Health maintenance. Doing well.  Recommend 23 valent Pneumovax he states he will consider.  Coated baby aspirin daily.  Discussed health maintenance, screening test, lifestyle modification.  Hypertension.    Much improved today, on increased atenolol to 25 mg daily.  Follow-up recheck in  6 mos.  Home values reviewed, has been doing well on atenolol 12.5 mg daily, continue Rx.  Remote history of benign cardiac stress testing, recommend repeat  stress echo, carotid Dopplers he states he will consider.    Palpitations.  History of, much improved on atenolol.  Limit caffeine.  Hyperlipidemia.  Clinically improved today tolerating Crestor 5 mg 3 days/week.  Discussed diet and exercise lifestyle to modification.   Persistent elevation ,  recheck blood work 6 months.  Discussed diet, exercise, lifestyle modification.   Direct inguinal hernia.  Small/nontender, asymptomatic currently.  Continue to monitor clinically.  Consider surgery referral if worsening symptoms.  Asymmetric abdominal fat.  He is concerned about a prominence of his abdominal wall which is a symmetric.  Benign exam today, asymptomatic.  Reassurance given.  Has had CT abdomen will get records.  Follow-up recheck.  Consider repeat imaging, surgery referral if worsening symptoms.  Balanitis.  Start topical Rx.  Call return if persistent symptoms.  Colon screening.  Has had colonoscopy 2015, repeat was recommended in 10 years, will get the records.           Problem List Items Addressed This Visit        Unprioritized    Mixed hyperlipidemia    Relevant Medications    rosuvastatin (CRESTOR) 5 MG tablet    Essential hypertension - Primary              Expected course, medications, and adverse effects discussed.  Call or return if worsening or persistent symptoms.  The complete contents of the Assessment and Plan and Data / Lab Results as documented above have been reviewed and addressed by myself with the patient today as part of an ongoing evaluation / treatment plan.  If some of the documentation has been copied from a previous note and is unchanged it indicates that this problem / plan has been assessed today but is stable from a previous visit and no changes have been recommended.

## 2021-04-20 ENCOUNTER — OFFICE VISIT (OUTPATIENT)
Dept: FAMILY MEDICINE CLINIC | Facility: CLINIC | Age: 71
End: 2021-04-20

## 2021-04-20 VITALS
BODY MASS INDEX: 25.47 KG/M2 | DIASTOLIC BLOOD PRESSURE: 74 MMHG | HEIGHT: 72 IN | SYSTOLIC BLOOD PRESSURE: 127 MMHG | WEIGHT: 188 LBS

## 2021-04-20 DIAGNOSIS — I10 ESSENTIAL HYPERTENSION: Primary | ICD-10-CM

## 2021-04-20 DIAGNOSIS — E78.2 MIXED HYPERLIPIDEMIA: ICD-10-CM

## 2021-04-20 PROCEDURE — 99443 PR PHYS/QHP TELEPHONE EVALUATION 21-30 MIN: CPT | Performed by: FAMILY MEDICINE

## 2021-04-20 RX ORDER — ROSUVASTATIN CALCIUM 5 MG/1
5 TABLET, COATED ORAL EVERY OTHER DAY
COMMUNITY
Start: 2021-03-26 | End: 2022-04-25

## 2021-09-21 ENCOUNTER — OFFICE VISIT (OUTPATIENT)
Dept: FAMILY MEDICINE CLINIC | Facility: CLINIC | Age: 71
End: 2021-09-21

## 2021-09-21 VITALS
BODY MASS INDEX: 23.35 KG/M2 | TEMPERATURE: 96.8 F | WEIGHT: 172.4 LBS | HEIGHT: 72 IN | DIASTOLIC BLOOD PRESSURE: 98 MMHG | SYSTOLIC BLOOD PRESSURE: 160 MMHG | RESPIRATION RATE: 18 BRPM | HEART RATE: 90 BPM | OXYGEN SATURATION: 98 %

## 2021-09-21 DIAGNOSIS — R35.0 BENIGN PROSTATIC HYPERPLASIA WITH URINARY FREQUENCY: ICD-10-CM

## 2021-09-21 DIAGNOSIS — I10 ESSENTIAL HYPERTENSION: Primary | ICD-10-CM

## 2021-09-21 DIAGNOSIS — Z12.11 SCREENING FOR MALIGNANT NEOPLASM OF COLON: ICD-10-CM

## 2021-09-21 DIAGNOSIS — Z00.00 MEDICARE ANNUAL WELLNESS VISIT, SUBSEQUENT: ICD-10-CM

## 2021-09-21 DIAGNOSIS — Z12.5 ENCOUNTER FOR SCREENING FOR MALIGNANT NEOPLASM OF PROSTATE: ICD-10-CM

## 2021-09-21 DIAGNOSIS — N40.1 BENIGN PROSTATIC HYPERPLASIA WITH URINARY FREQUENCY: ICD-10-CM

## 2021-09-21 DIAGNOSIS — D22.9 NEVUS: ICD-10-CM

## 2021-09-21 DIAGNOSIS — E78.2 MIXED HYPERLIPIDEMIA: ICD-10-CM

## 2021-09-21 PROCEDURE — 99214 OFFICE O/P EST MOD 30 MIN: CPT | Performed by: FAMILY MEDICINE

## 2021-09-21 PROCEDURE — G0439 PPPS, SUBSEQ VISIT: HCPCS | Performed by: FAMILY MEDICINE

## 2021-09-21 PROCEDURE — 3725F SCREEN DEPRESSION PERFORMED: CPT | Performed by: FAMILY MEDICINE

## 2021-09-21 PROCEDURE — 99497 ADVNCD CARE PLAN 30 MIN: CPT | Performed by: FAMILY MEDICINE

## 2021-09-21 NOTE — PROGRESS NOTES
Subjective   Emanuel Alcantara is a 70 y.o. male.     Chief Complaint   Patient presents with   • Medicare Wellness-subsequent   • Hypertension   • Hyperlipidemia       The patient is here: for coordination of medical care to discuss health maintenance and disease prevention to follow up on multiple medical problems.  Last comprehensive physical was on 9/15/2020.  Previous physical was performed by  Monty Henry MD  Overall has: vigorous activity with work/home activities, exercises 2 - 3 times per week, good appetite, feels well with minor complaints, good energy level and is sleeping well. Nutrition: appropriate balanced diet and eating a variety of foods. Last tetanus shot was 10/27/2016.  Patient's last stress test was: 12/11/2001 Patient's last PSA was: 9/13/2021 and was 1.3   Last Completed Colonoscopy          COLORECTAL CANCER SCREENING  Next due on 1/1/2025 01/01/2015  COLONOSCOPY (Done)    03/17/2005  SCANNED - COLONOSCOPY                Hypertension  This is a chronic problem. The current episode started more than 1 year ago. The problem is uncontrolled. Pertinent negatives include no anxiety, blurred vision, chest pain, headaches, malaise/fatigue, neck pain, palpitations or shortness of breath. Risk factors for coronary artery disease include family history, dyslipidemia and male gender. Current antihypertension treatment includes beta blockers. There are no compliance problems.    Hyperlipidemia  This is a chronic problem. The current episode started more than 1 year ago. He has no history of diabetes, hypothyroidism or obesity. Associated symptoms include leg pain. Pertinent negatives include no chest pain or shortness of breath. Current antihyperlipidemic treatment includes statins. The current treatment provides moderate improvement of lipids. Risk factors for coronary artery disease include dyslipidemia, hypertension and male sex.        Recent Hospitalizations:  No hospitalization(s) within  the last year..  ccc    I personally reviewed and updated the patient's allergies, medications, problem list, and past medical, surgical, social, and family history. I have reviewed and confirmed the accuracy of the HPI and ROS as documented by the MA/LPN/RN Monty Henry MD      Family History   Problem Relation Age of Onset   • Hypertension Mother    • Osteoporosis Mother    • Glaucoma Mother    • Macular degeneration Mother    • Heart failure Father    • Hypertension Father    • Obesity Father    • Hypertension Sister    • Stomach cancer Maternal Grandfather    • Diabetes Paternal Grandmother    • Pancreatic cancer Paternal Grandmother    • Aneurysm Paternal Grandfather        Social History     Tobacco Use   • Smoking status: Never Smoker   • Smokeless tobacco: Never Used   Vaping Use   • Vaping Use: Never used   Substance Use Topics   • Alcohol use: Not Currently   • Drug use: Never       History reviewed. No pertinent surgical history.    Patient Active Problem List   Diagnosis   • Allergic rhinitis   • Benign prostatic hyperplasia   • Mixed hyperlipidemia   • Essential hypertension   • Osteoarthritis   • Prostatitis, chronic   • Encounter for screening for malignant neoplasm of prostate   • Balanitis   • Direct inguinal hernia   • Medicare annual wellness visit, subsequent   • Nevus         Current Outpatient Medications:   •  atenolol (TENORMIN) 25 MG tablet, Take 1 tablet by mouth Daily., Disp: 90 tablet, Rfl: 3  •  clotrimazole-betamethasone (Lotrisone) 1-0.05 % cream, Apply topically twice daily as needed, Disp: 45 g, Rfl: 5  •  rosuvastatin (CRESTOR) 5 MG tablet, , Disp: , Rfl:          Review of Systems   Constitutional: Negative for chills, diaphoresis and malaise/fatigue.   HENT: Negative for trouble swallowing and voice change.    Eyes: Negative for blurred vision and visual disturbance.   Respiratory: Negative for shortness of breath.    Cardiovascular: Negative for chest pain and palpitations.  "  Gastrointestinal: Negative for abdominal pain and nausea.   Endocrine: Negative for polydipsia and polyphagia.   Genitourinary: Negative for hematuria.   Musculoskeletal: Negative for neck pain and neck stiffness.   Skin: Negative for color change and pallor.   Allergic/Immunologic: Negative for immunocompromised state.   Neurological: Negative for seizures and syncope.   Hematological: Negative for adenopathy.   Psychiatric/Behavioral: Negative for hallucinations, sleep disturbance and suicidal ideas.       I have reviewed and confirmed the accuracy of the ROS as documented by the MA/LPN/RN Monty Henry MD      Objective   /98   Pulse 90   Temp 96.8 °F (36 °C)   Resp 18   Ht 182.9 cm (72\")   Wt 78.2 kg (172 lb 6.4 oz)   SpO2 98%   BMI 23.38 kg/m²   BP Readings from Last 3 Encounters:   09/21/21 160/98   04/20/21 127/74   03/16/21 160/85     Wt Readings from Last 3 Encounters:   09/21/21 78.2 kg (172 lb 6.4 oz)   04/20/21 85.3 kg (188 lb)   03/16/21 85.4 kg (188 lb 3.2 oz)     Physical Exam  Constitutional:       Appearance: He is well-developed. He is not diaphoretic.   HENT:      Head: Normocephalic.      Right Ear: Tympanic membrane, ear canal and external ear normal.      Left Ear: Tympanic membrane, ear canal and external ear normal.      Nose: Nose normal.   Eyes:      General: Lids are normal.      Conjunctiva/sclera: Conjunctivae normal.      Pupils: Pupils are equal, round, and reactive to light.   Neck:      Thyroid: No thyromegaly.      Vascular: No carotid bruit or JVD.      Trachea: No tracheal deviation.   Cardiovascular:      Rate and Rhythm: Normal rate and regular rhythm.      Heart sounds: Normal heart sounds. No murmur heard.   No friction rub. No gallop.    Pulmonary:      Effort: Pulmonary effort is normal.      Breath sounds: Normal breath sounds. No stridor. No decreased breath sounds, wheezing or rales.   Abdominal:      General: Bowel sounds are normal. There is no " distension.      Palpations: Abdomen is soft. There is no mass.      Tenderness: There is no abdominal tenderness. There is no guarding or rebound.      Hernia: No hernia is present.   Lymphadenopathy:      Head:      Right side of head: No submental, submandibular, tonsillar, preauricular, posterior auricular or occipital adenopathy.      Left side of head: No submental, submandibular, tonsillar, preauricular, posterior auricular or occipital adenopathy.      Cervical: No cervical adenopathy.   Skin:     General: Skin is warm and dry.      Coloration: Skin is not pale.      Comments: Irregular nevus right forearm   Neurological:      Mental Status: He is alert and oriented to person, place, and time.      Cranial Nerves: No cranial nerve deficit.      Sensory: No sensory deficit.      Coordination: Coordination normal.      Gait: Gait normal.      Deep Tendon Reflexes: Reflexes are normal and symmetric.         Data / Lab Results:    No results found for: HGBA1C  Lab Results   Component Value Date    GLU 96 09/13/2021     Lab Results   Component Value Date     (H) 09/13/2021     (H) 03/08/2021     (H) 09/01/2020     Lab Results   Component Value Date    CHOL 232 (H) 03/08/2019    CHOL 227 (H) 11/26/2018    CHOL 214 (H) 06/17/2016     Lab Results   Component Value Date    TRIG 61 09/13/2021    TRIG 91 03/08/2021    TRIG 76 09/01/2020     Lab Results   Component Value Date    HDL 44 09/13/2021    HDL 47 03/08/2021    HDL 47 09/01/2020     Lab Results   Component Value Date    PSA 1.3 09/13/2021    PSA 1.2 09/01/2020    PSA 1.0 11/26/2018     Lab Results   Component Value Date    WBC 5.9 09/13/2021    HGB 14.3 09/13/2021    HCT 43.3 09/13/2021    MCV 91 09/13/2021     09/13/2021     Lab Results   Component Value Date    TSH 4.030 09/13/2021      Lab Results   Component Value Date    GLUCOSE 93 11/26/2018    BUN 13 09/13/2021    CREATININE 0.95 09/13/2021    EGFRIFNONA 82 09/13/2021     EGFRIFAFRI 95 09/13/2021    BCR 14 09/13/2021    K 4.2 09/13/2021    CO2 23 09/13/2021    CALCIUM 9.3 09/13/2021    PROTENTOTREF 7.3 09/13/2021    ALBUMIN 4.6 09/13/2021    LABIL2 1.7 09/13/2021    AST 23 09/13/2021    ALT 19 09/13/2021     No results found for: LEXY, RF, SEDRATE   No results found for: CRP   No results found for: IRON, TIBC, FERRITIN   No results found for: CEKBEQAE27       Age-appropriate Screening Schedule:  Refer to the list below for future screening recommendations based on patient's age, sex and/or medical conditions. Orders for these recommended tests are listed in the plan section. The patient has been provided with a written plan.    Health Maintenance   Topic Date Due   • ZOSTER VACCINE (1 of 2) Never done   • INFLUENZA VACCINE  10/01/2021   • LIPID PANEL  09/13/2022   • TDAP/TD VACCINES (3 - Td or Tdap) 10/27/2026       Depression Screen:   PHQ-2/PHQ-9 Depression Screening 9/21/2021   Little interest or pleasure in doing things 0   Feeling down, depressed, or hopeless 0   Trouble falling or staying asleep, or sleeping too much 0   Feeling tired or having little energy 0   Poor appetite or overeating 0   Feeling bad about yourself - or that you are a failure or have let yourself or your family down 0   Trouble concentrating on things, such as reading the newspaper or watching television 0   Moving or speaking so slowly that other people could have noticed. Or the opposite - being so fidgety or restless that you have been moving around a lot more than usual 0   Thoughts that you would be better off dead, or of hurting yourself in some way 0   Total Score 0   If you checked off any problems, how difficult have these problems made it for you to do your work, take care of things at home, or get along with other people? Not difficult at all     I spent more than 16 minutes asking patient questions, counseling and documenting in the chart.    Health Habits and Functional and Cognitive  Screening:  Functional & Cognitive Status 9/21/2021   Do you have difficulty preparing food and eating? No   Do you have difficulty bathing yourself, getting dressed or grooming yourself? No   Do you have difficulty using the toilet? No   Do you have difficulty moving around from place to place? No   Do you have trouble with steps or getting out of a bed or a chair? No   Current Diet Well Balanced Diet   Dental Exam Up to date   Eye Exam Not up to date   Exercise (times per week) 7 times per week   Current Exercises Include Walking;Yard Work   Current Exercise Activities Include -   Do you need help using the phone?  No   Are you deaf or do you have serious difficulty hearing?  No   Do you need help with transportation? No   Do you need help shopping? No   Do you need help preparing meals?  No   Do you need help with housework?  No   Do you need help with laundry? No   Do you need help taking your medications? No   Do you need help managing money? No   Do you ever drive or ride in a car without wearing a seat belt? No   Have you felt unusual stress, anger or loneliness in the last month? No   Who do you live with? Spouse   If you need help, do you have trouble finding someone available to you? No   Have you been bothered in the last four weeks by sexual problems? No   Do you have difficulty concentrating, remembering or making decisions? No       Does the patient have evidence of cognitive impairment? No    Advance Care Planning:  ACP discussion was held with the patient during this visit. Patient does not have an advance directive, information provided.     A face-to-face visit was completed today with patient.  Counseling explanation, and discussion of advanced directives was performed.   The last advanced care visit was performed in 2020.  In a near life ending situation, from which he is not expected to recover functionally, and he is not able to speak for him, he does not want life sustaining measures. We  discussed feeding tubes, ventilators and cardiac support as well as dialysis.    I spent more than 16 minutes discussing Advanced Care Planning information and documenting in the chart.    Identification of Risk Factors:  Risk factors include: Advance Directive Discussion  Cardiovascular risk  Colon Cancer Screening  Fall Risk  Immunizations Discussed/Encouraged (specific immunizations; Pneumococcal 23, Shingrix and COVID19 ).    Compared to one year ago, the patient feels his physical health is the same.  Compared to one year ago, the patient feels his mental health is the same.    Patient Self-Management and Personalized Health Advice  The patient has been provided with information about: diet, exercise, prevention of cardiac or vascular disease, fall prevention, designing advance directives and supplements and preventive services including:   · Annual Wellness Visit (AWV)  · Cardiovascular Disease Screening Tests (may do this order every 5 years in beneficiaries without signs or symptoms of cardiovascular disease)  · Colorectal Cancer Screening, Colonoscopy  · Depression Screening (15 minutes face to face, Code )  · Diabetes Screening-Lab Order for either glucose quantitative blood (except reagent strip), glucose;post glucose dose(includes glucose), or glucose tolerance test-3 specimens(includes glucose)  · Influenza Vaccine and Administration  · Pneumococcal Vaccine and Administration.      Assessment/Plan      Medications        Problem List         LOS    Medicare wellness. Doing well.  Recommend 23 valent Pneumovax he states he will consider.  Tolerating coated baby aspirin every other day.  Discussed health maintenance, screening test, lifestyle modification.  Hypertension.    Much improved today on increased atenolol to 25 mg daily.  Home blood pressure monitor results reviewed/good control.  Remote history of benign cardiac stress testing, recommend repeat stress echo, carotid Dopplers he states he  will consider next year.    Palpitations.  History of, much improved on atenolol.  Limit caffeine.  Hyperlipidemia.  Much improved today tolerating Crestor 5 mg 3 days/week, LDL to 100.  Discussed diet and exercise lifestyle to modification.  Discussed diet, exercise, lifestyle modification.   Direct inguinal hernia.  Small/nontender, asymptomatic currently.  Continue to monitor clinically.  Consider surgery referral if worsening symptoms.  Asymmetric abdominal fat.  He is concerned about a prominence of his abdominal wall which is a symmetric.  Benign exam today, asymptomatic.  Reassurance given.  Has had CT abdomen will get records.  Follow-up recheck.  Consider repeat imaging, surgery referral if worsening symptoms.  Colon screening.  Has had colonoscopy 2015, repeat was recommended in 10 years, will get the records.  Followed by I.  Screening for prostate cancer.  PSA benign.  Irregular nevus.  Right forearm.  Positive family history of melanoma father.  Recommend resection/follow-up visit scheduled.  Long discussion today, recommend COVID-19 vaccination he states he will consider.    Diagnoses and all orders for this visit:    1. Essential hypertension (Primary)    2. Mixed hyperlipidemia    3. Encounter for screening for malignant neoplasm of prostate    4. Screening for malignant neoplasm of colon    5. Benign prostatic hyperplasia with urinary frequency    6. Medicare annual wellness visit, subsequent    7. Nevus              Expected course, medications, and adverse effects discussed.  Call or return if worsening or persistent symptoms.  I wore protective equipment throughout this patient encounter including a mask, gloves, and eye protection.  Hand hygiene was performed before donning protective equipment and after removal when leaving the room. The complete contents of the Assessment and Plan and Data / Lab Results as documented above have been reviewed and addressed by myself with the patient today as  part of an ongoing evaluation / treatment plan.  If some of the documentation has been copied from a previous note and is unchanged it indicates that this problem / plan has been assessed today but is stable from a previous visit and no changes have been recommended.

## 2021-09-28 ENCOUNTER — PROCEDURE VISIT (OUTPATIENT)
Dept: FAMILY MEDICINE CLINIC | Facility: CLINIC | Age: 71
End: 2021-09-28

## 2021-09-28 VITALS
DIASTOLIC BLOOD PRESSURE: 90 MMHG | HEIGHT: 72 IN | RESPIRATION RATE: 18 BRPM | WEIGHT: 171.2 LBS | OXYGEN SATURATION: 98 % | HEART RATE: 83 BPM | SYSTOLIC BLOOD PRESSURE: 130 MMHG | TEMPERATURE: 97.1 F | BODY MASS INDEX: 23.19 KG/M2

## 2021-09-28 DIAGNOSIS — L98.9 SKIN LESION: Primary | ICD-10-CM

## 2021-09-28 DIAGNOSIS — D23.9 DYSPLASTIC NEVUS: ICD-10-CM

## 2021-09-28 PROCEDURE — 99213 OFFICE O/P EST LOW 20 MIN: CPT | Performed by: FAMILY MEDICINE

## 2021-09-28 PROCEDURE — 11600 EXC TR-EXT MAL+MARG 0.5 CM/<: CPT | Performed by: FAMILY MEDICINE

## 2021-10-04 LAB
DX ICD CODE: NORMAL
DX ICD CODE: NORMAL
PATH REPORT.FINAL DX SPEC: NORMAL
PATH REPORT.GROSS SPEC: NORMAL
PATH REPORT.SITE OF ORIGIN SPEC: NORMAL
PATHOLOGIST NAME: NORMAL
PAYMENT PROCEDURE: NORMAL

## 2021-10-05 ENCOUNTER — OFFICE VISIT (OUTPATIENT)
Dept: FAMILY MEDICINE CLINIC | Facility: CLINIC | Age: 71
End: 2021-10-05

## 2021-10-05 VITALS
WEIGHT: 173.4 LBS | HEIGHT: 72 IN | HEART RATE: 72 BPM | RESPIRATION RATE: 16 BRPM | BODY MASS INDEX: 23.49 KG/M2 | SYSTOLIC BLOOD PRESSURE: 180 MMHG | TEMPERATURE: 97.5 F | DIASTOLIC BLOOD PRESSURE: 96 MMHG | OXYGEN SATURATION: 98 %

## 2021-10-05 DIAGNOSIS — D23.61 DYSPLASTIC NEVUS OF RIGHT UPPER EXTREMITY: Primary | ICD-10-CM

## 2021-10-05 DIAGNOSIS — I10 ESSENTIAL HYPERTENSION: ICD-10-CM

## 2021-10-05 DIAGNOSIS — E78.2 MIXED HYPERLIPIDEMIA: ICD-10-CM

## 2021-10-05 PROCEDURE — 99213 OFFICE O/P EST LOW 20 MIN: CPT | Performed by: FAMILY MEDICINE

## 2021-10-05 NOTE — PROGRESS NOTES
Subjective   Emanuel Alcantara is a 70 y.o. male.     Chief Complaint   Patient presents with   • Suture / Staple Removal   • Displastic nevus       Suture / Staple Removal  The sutures were placed 7 to 10 days ago. He tried regular soap and water washings and antibiotic ointment use since the wound repair. The treatment provided moderate relief. The maximum temperature noted was less than 100.4 F. There has been no drainage from the wound. There is no redness present. There is no swelling present. There is no pain present. He has no difficulty moving the affected extremity or digit.            I personally reviewed and updated the patient's allergies, medications, problem list, and past medical, surgical, social, and family history. I have reviewed and confirmed the accuracy of the History of Present Illness and Review of Symptoms as documented by the MA/LPN/RN. Monty Henry MD    Family History   Problem Relation Age of Onset   • Hypertension Mother    • Osteoporosis Mother    • Glaucoma Mother    • Macular degeneration Mother    • Heart failure Father    • Hypertension Father    • Obesity Father    • Hypertension Sister    • Stomach cancer Maternal Grandfather    • Diabetes Paternal Grandmother    • Pancreatic cancer Paternal Grandmother    • Aneurysm Paternal Grandfather        Social History     Tobacco Use   • Smoking status: Never Smoker   • Smokeless tobacco: Never Used   Vaping Use   • Vaping Use: Never used   Substance Use Topics   • Alcohol use: Not Currently   • Drug use: Never       History reviewed. No pertinent surgical history.    Patient Active Problem List   Diagnosis   • Allergic rhinitis   • Benign prostatic hyperplasia   • Mixed hyperlipidemia   • Essential hypertension   • Osteoarthritis   • Prostatitis, chronic   • Encounter for screening for malignant neoplasm of prostate   • Balanitis   • Direct inguinal hernia   • Medicare annual wellness visit, subsequent   • Nevus   • Dysplastic nevus  "        Current Outpatient Medications:   •  atenolol (TENORMIN) 25 MG tablet, Take 1 tablet by mouth Daily., Disp: 90 tablet, Rfl: 3  •  clotrimazole-betamethasone (Lotrisone) 1-0.05 % cream, Apply topically twice daily as needed, Disp: 45 g, Rfl: 5  •  rosuvastatin (CRESTOR) 5 MG tablet, , Disp: , Rfl:          Review of Systems   Constitutional: Negative for chills and diaphoresis.   Eyes: Negative for visual disturbance.   Respiratory: Negative for shortness of breath.    Cardiovascular: Negative for chest pain and palpitations.   Gastrointestinal: Negative for abdominal pain and nausea.   Endocrine: Negative for polydipsia and polyphagia.   Musculoskeletal: Negative for neck stiffness.   Skin: Negative for color change and pallor.   Neurological: Negative for seizures and syncope.   Hematological: Negative for adenopathy.       I have reviewed and confirmed the accuracy of the ROS as documented by the MA/LPN/RN Monty Henry MD      Objective   /96   Pulse 72   Temp 97.5 °F (36.4 °C)   Resp 16   Ht 182.9 cm (72\")   Wt 78.7 kg (173 lb 6.4 oz)   SpO2 98%   BMI 23.52 kg/m²   BP Readings from Last 3 Encounters:   10/05/21 180/96   09/28/21 130/90   09/21/21 160/98     Wt Readings from Last 3 Encounters:   10/05/21 78.7 kg (173 lb 6.4 oz)   09/28/21 77.7 kg (171 lb 3.2 oz)   09/21/21 78.2 kg (172 lb 6.4 oz)     Physical Exam  Constitutional:       Appearance: Normal appearance. He is well-developed. He is not diaphoretic.   Cardiovascular:      Rate and Rhythm: Normal rate and regular rhythm.      Pulses: Normal pulses.      Heart sounds: Normal heart sounds, S1 normal and S2 normal. No murmur heard.  No friction rub. No gallop.    Pulmonary:      Effort: Pulmonary effort is normal. No accessory muscle usage.      Breath sounds: Normal breath sounds. No stridor. No decreased breath sounds, wheezing, rhonchi or rales.   Abdominal:      General: Bowel sounds are normal. There is no distension.      " Palpations: Abdomen is soft. Abdomen is not rigid. There is no mass or pulsatile mass.      Tenderness: There is no abdominal tenderness. There is no guarding or rebound. Negative signs include Castillo's sign.      Hernia: No hernia is present.   Skin:     General: Skin is warm and dry.      Coloration: Skin is not pale.      Comments: Linear incision forearm well-healed, sutures in place   Neurological:      Mental Status: He is alert and oriented to person, place, and time.      Coordination: Coordination normal.      Gait: Gait normal.         Data / Lab Results:    No results found for: HGBA1C  Lab Results   Component Value Date    GLU 96 09/13/2021     Lab Results   Component Value Date     (H) 09/13/2021     (H) 03/08/2021     (H) 09/01/2020     Lab Results   Component Value Date    CHOL 232 (H) 03/08/2019    CHOL 227 (H) 11/26/2018    CHOL 214 (H) 06/17/2016     Lab Results   Component Value Date    TRIG 61 09/13/2021    TRIG 91 03/08/2021    TRIG 76 09/01/2020     Lab Results   Component Value Date    HDL 44 09/13/2021    HDL 47 03/08/2021    HDL 47 09/01/2020     Lab Results   Component Value Date    PSA 1.3 09/13/2021    PSA 1.2 09/01/2020    PSA 1.0 11/26/2018     Lab Results   Component Value Date    WBC 5.9 09/13/2021    HGB 14.3 09/13/2021    HCT 43.3 09/13/2021    MCV 91 09/13/2021     09/13/2021     Lab Results   Component Value Date    TSH 4.030 09/13/2021      Lab Results   Component Value Date    GLUCOSE 93 11/26/2018    BUN 13 09/13/2021    CREATININE 0.95 09/13/2021    EGFRIFNONA 82 09/13/2021    EGFRIFAFRI 95 09/13/2021    BCR 14 09/13/2021    K 4.2 09/13/2021    CO2 23 09/13/2021    CALCIUM 9.3 09/13/2021    PROTENTOTREF 7.3 09/13/2021    ALBUMIN 4.6 09/13/2021    LABIL2 1.7 09/13/2021    AST 23 09/13/2021    ALT 19 09/13/2021     No results found for: LEXY, RF, SEDRATE   No results found for: CRP   No results found for: IRON, TIBC, FERRITIN   No results found for:  HOFNSMIM11       Assessment/Plan      Medications        Problem List         LOS     Health maintenance.  Doing well.  Recommend 23 valent Pneumovax he states he will consider.  Tolerating coated baby aspirin every other day.  Discussed health maintenance, screening test, lifestyle modification.  Hypertension.    Much improved today on increased atenolol to 25 mg daily.  Home blood pressure monitor results reviewed/good control.  Remote history of benign cardiac stress testing, recommend repeat stress echo, carotid Dopplers he states he will consider next year.    Palpitations.  History of, much improved on atenolol.  Limit caffeine.  Hyperlipidemia.  Much improved today tolerating Crestor 5 mg 3 days/week, LDL to 100.  Discussed diet and exercise lifestyle to modification.  Discussed diet, exercise, lifestyle modification.   Direct inguinal hernia.  Small/nontender, asymptomatic currently.  Continue to monitor clinically.  Consider surgery referral if worsening symptoms.  Asymmetric abdominal fat.  He is concerned about a prominence of his abdominal wall which is a symmetric.  Benign exam today, asymptomatic.  Reassurance given.  Has had CT abdomen will get records.  Follow-up recheck.  Consider repeat imaging, surgery referral if worsening symptoms.  Colon screening.  Has had colonoscopy 2015, repeat was recommended in 10 years, will get the records.  Followed by [facility].  Screening for prostate cancer.  PSA benign.  Long discussion today, recommend COVID-19 vaccination he states he will consider.  Dysplastic nevus.  With severe atypia.  Right forearm, healing well status post resection, sutures removed today.  Margins clear but close, recommend further resection, dermatology referral scheduled.  Positive multiple nevi on exam, benign appearance today.  Sun protection discussed.  Follow-up for yearly skin exams.-Positive family history melanoma, father.        Diagnoses and all orders for this visit:    1. Dysplastic  nevus of right upper extremity (Primary)  -     Ambulatory Referral to Dermatology              Expected course, medications, and adverse effects discussed.  Call or return if worsening or persistent symptoms.  I wore protective equipment throughout this patient encounter including a mask, gloves, and eye protection.  Hand hygiene was performed before donning protective equipment and after removal when leaving the room. The complete contents of the Assessment and Plan and Data/Lab Results as documented above have been reviewed and addressed by myself with the patient today as part of an ongoing evaluation / treatment plan.  If some of the documentation has been copied from a previous note and is unchanged it indicates that this problem / plan has been assessed today but is stable from a previous visit and no changes have been recommended.

## 2021-10-06 ENCOUNTER — TELEPHONE (OUTPATIENT)
Dept: FAMILY MEDICINE CLINIC | Facility: CLINIC | Age: 71
End: 2021-10-06

## 2021-10-06 NOTE — TELEPHONE ENCOUNTER
Caller: Emanuel Alcantara    Relationship: Self    Best call back number: [phone]    What is the medical concern/diagnosis:     What specialty or service is being requested: DERMATOLOGY    What is the provider, practice or medical service name: [facility]    What is the office location:     What is the office phone number:    Any additional details: PLEASE RE FAX TO THEIR OFFICE  AT : [phone]  ATTEN: JARETT     THEY ARE NEEDING VISIT NOTES ALSO AND PATHOLOGY REPORT

## 2021-10-14 PROBLEM — D23.9 DYSPLASTIC NEVUS: Status: ACTIVE | Noted: 2021-10-14

## 2021-12-06 ENCOUNTER — HOSPITAL ENCOUNTER (INPATIENT)
Facility: HOSPITAL | Age: 71
LOS: 1 days | Discharge: HOME | End: 2021-12-08
Attending: EMERGENCY MEDICINE | Admitting: HOSPITALIST

## 2021-12-06 ENCOUNTER — APPOINTMENT (OUTPATIENT)
Dept: CT IMAGING | Facility: HOSPITAL | Age: 71
End: 2021-12-06

## 2021-12-06 ENCOUNTER — APPOINTMENT (OUTPATIENT)
Dept: MRI IMAGING | Facility: HOSPITAL | Age: 71
End: 2021-12-06

## 2021-12-06 DIAGNOSIS — U07.1 COVID-19 VIRUS INFECTION: Primary | ICD-10-CM

## 2021-12-06 DIAGNOSIS — R53.1 WEAKNESS: ICD-10-CM

## 2021-12-06 PROBLEM — R29.6 MULTIPLE FALLS: Status: ACTIVE | Noted: 2021-12-06

## 2021-12-06 PROBLEM — R20.2 PARESTHESIA OF HAND, BILATERAL: Status: ACTIVE | Noted: 2021-12-06

## 2021-12-06 PROBLEM — R29.898 BILATERAL ARM WEAKNESS: Status: ACTIVE | Noted: 2021-12-06

## 2021-12-06 PROBLEM — R29.898 BILATERAL LEG WEAKNESS: Status: ACTIVE | Noted: 2021-12-06

## 2021-12-06 PROBLEM — R27.0 ATAXIA: Status: ACTIVE | Noted: 2021-12-06

## 2021-12-06 PROBLEM — R20.2 BILATERAL LEG PARESTHESIA: Status: ACTIVE | Noted: 2021-12-06

## 2021-12-06 LAB
ALBUMIN SERPL-MCNC: 4.5 G/DL (ref 3.5–5.2)
ALBUMIN/GLOB SERPL: 1.2 G/DL
ALP SERPL-CCNC: 70 U/L (ref 39–117)
ALT SERPL W P-5'-P-CCNC: 29 U/L (ref 1–41)
ANION GAP SERPL CALCULATED.3IONS-SCNC: 17 MMOL/L (ref 5–15)
AST SERPL-CCNC: 46 U/L (ref 1–40)
BASOPHILS # BLD AUTO: 0 10*3/MM3 (ref 0–0.2)
BASOPHILS NFR BLD AUTO: 0.6 % (ref 0–1.5)
BILIRUB SERPL-MCNC: 0.4 MG/DL (ref 0–1.2)
BUN SERPL-MCNC: 12 MG/DL (ref 8–23)
BUN/CREAT SERPL: 14.3 (ref 7–25)
CALCIUM SPEC-SCNC: 9.2 MG/DL (ref 8.6–10.5)
CHLORIDE SERPL-SCNC: 102 MMOL/L (ref 98–107)
CK SERPL-CCNC: 515 U/L (ref 20–200)
CO2 SERPL-SCNC: 21 MMOL/L (ref 22–29)
CREAT SERPL-MCNC: 0.84 MG/DL (ref 0.76–1.27)
CRP SERPL-MCNC: 0.47 MG/DL (ref 0–0.5)
DEPRECATED RDW RBC AUTO: 43.8 FL (ref 37–54)
EOSINOPHIL # BLD AUTO: 0 10*3/MM3 (ref 0–0.4)
EOSINOPHIL NFR BLD AUTO: 0 % (ref 0.3–6.2)
ERYTHROCYTE [DISTWIDTH] IN BLOOD BY AUTOMATED COUNT: 13.9 % (ref 12.3–15.4)
ERYTHROCYTE [SEDIMENTATION RATE] IN BLOOD: 21 MM/HR (ref 0–20)
GFR SERPL CREATININE-BSD FRML MDRD: 91 ML/MIN/1.73
GLOBULIN UR ELPH-MCNC: 3.7 GM/DL
GLUCOSE SERPL-MCNC: 99 MG/DL (ref 65–99)
HCT VFR BLD AUTO: 45.5 % (ref 37.5–51)
HGB BLD-MCNC: 15.7 G/DL (ref 13–17.7)
LYMPHOCYTES # BLD AUTO: 0.7 10*3/MM3 (ref 0.7–3.1)
LYMPHOCYTES NFR BLD AUTO: 8.8 % (ref 19.6–45.3)
MCH RBC QN AUTO: 31.3 PG (ref 26.6–33)
MCHC RBC AUTO-ENTMCNC: 34.6 G/DL (ref 31.5–35.7)
MCV RBC AUTO: 90.5 FL (ref 79–97)
MONOCYTES # BLD AUTO: 0.7 10*3/MM3 (ref 0.1–0.9)
MONOCYTES NFR BLD AUTO: 9 % (ref 5–12)
NEUTROPHILS NFR BLD AUTO: 6.5 10*3/MM3 (ref 1.7–7)
NEUTROPHILS NFR BLD AUTO: 81.6 % (ref 42.7–76)
NRBC BLD AUTO-RTO: 0.1 /100 WBC (ref 0–0.2)
PLATELET # BLD AUTO: 179 10*3/MM3 (ref 140–450)
PMV BLD AUTO: 7.7 FL (ref 6–12)
POTASSIUM SERPL-SCNC: 4.2 MMOL/L (ref 3.5–5.2)
PROT SERPL-MCNC: 8.2 G/DL (ref 6–8.5)
RBC # BLD AUTO: 5.02 10*6/MM3 (ref 4.14–5.8)
SARS-COV-2 RNA PNL SPEC NAA+PROBE: DETECTED
SODIUM SERPL-SCNC: 140 MMOL/L (ref 136–145)
T4 FREE SERPL-MCNC: 1.17 NG/DL (ref 0.93–1.7)
TSH SERPL DL<=0.05 MIU/L-ACNC: 3.75 UIU/ML (ref 0.27–4.2)
WBC NRBC COR # BLD: 8 10*3/MM3 (ref 3.4–10.8)

## 2021-12-06 PROCEDURE — 85652 RBC SED RATE AUTOMATED: CPT | Performed by: NURSE PRACTITIONER

## 2021-12-06 PROCEDURE — 82550 ASSAY OF CK (CPK): CPT | Performed by: NURSE PRACTITIONER

## 2021-12-06 PROCEDURE — 85025 COMPLETE CBC W/AUTO DIFF WBC: CPT | Performed by: EMERGENCY MEDICINE

## 2021-12-06 PROCEDURE — 82607 VITAMIN B-12: CPT | Performed by: HOSPITALIST

## 2021-12-06 PROCEDURE — 80053 COMPREHEN METABOLIC PANEL: CPT | Performed by: EMERGENCY MEDICINE

## 2021-12-06 PROCEDURE — 99223 1ST HOSP IP/OBS HIGH 75: CPT | Performed by: NURSE PRACTITIONER

## 2021-12-06 PROCEDURE — 70450 CT HEAD/BRAIN W/O DYE: CPT

## 2021-12-06 PROCEDURE — 25010000002 GADOTERIDOL PER 1 ML: Performed by: HOSPITALIST

## 2021-12-06 PROCEDURE — 99284 EMERGENCY DEPT VISIT MOD MDM: CPT

## 2021-12-06 PROCEDURE — 87635 SARS-COV-2 COVID-19 AMP PRB: CPT | Performed by: EMERGENCY MEDICINE

## 2021-12-06 PROCEDURE — 72156 MRI NECK SPINE W/O & W/DYE: CPT

## 2021-12-06 PROCEDURE — 86140 C-REACTIVE PROTEIN: CPT | Performed by: NURSE PRACTITIONER

## 2021-12-06 PROCEDURE — G0378 HOSPITAL OBSERVATION PER HR: HCPCS

## 2021-12-06 PROCEDURE — 84439 ASSAY OF FREE THYROXINE: CPT | Performed by: HOSPITALIST

## 2021-12-06 PROCEDURE — A9579 GAD-BASE MR CONTRAST NOS,1ML: HCPCS | Performed by: HOSPITALIST

## 2021-12-06 PROCEDURE — 84443 ASSAY THYROID STIM HORMONE: CPT | Performed by: HOSPITALIST

## 2021-12-06 PROCEDURE — 36415 COLL VENOUS BLD VENIPUNCTURE: CPT | Performed by: HOSPITALIST

## 2021-12-06 RX ORDER — ACETAMINOPHEN 325 MG/1
650 TABLET ORAL EVERY 4 HOURS PRN
Status: DISCONTINUED | OUTPATIENT
Start: 2021-12-06 | End: 2021-12-08 | Stop reason: HOSPADM

## 2021-12-06 RX ORDER — ATENOLOL 25 MG/1
25 TABLET ORAL
Status: DISCONTINUED | OUTPATIENT
Start: 2021-12-07 | End: 2021-12-08 | Stop reason: HOSPADM

## 2021-12-06 RX ORDER — LABETALOL HYDROCHLORIDE 5 MG/ML
10 INJECTION, SOLUTION INTRAVENOUS EVERY 6 HOURS PRN
Status: DISCONTINUED | OUTPATIENT
Start: 2021-12-06 | End: 2021-12-08 | Stop reason: HOSPADM

## 2021-12-06 RX ORDER — ACETAMINOPHEN 160 MG/5ML
650 SOLUTION ORAL EVERY 4 HOURS PRN
Status: DISCONTINUED | OUTPATIENT
Start: 2021-12-06 | End: 2021-12-08 | Stop reason: HOSPADM

## 2021-12-06 RX ORDER — SODIUM CHLORIDE 0.9 % (FLUSH) 0.9 %
10 SYRINGE (ML) INJECTION AS NEEDED
Status: DISCONTINUED | OUTPATIENT
Start: 2021-12-06 | End: 2021-12-08 | Stop reason: HOSPADM

## 2021-12-06 RX ORDER — ACETAMINOPHEN 650 MG/1
650 SUPPOSITORY RECTAL EVERY 4 HOURS PRN
Status: DISCONTINUED | OUTPATIENT
Start: 2021-12-06 | End: 2021-12-08 | Stop reason: HOSPADM

## 2021-12-06 RX ORDER — ONDANSETRON 4 MG/1
4 TABLET, FILM COATED ORAL EVERY 6 HOURS PRN
Status: DISCONTINUED | OUTPATIENT
Start: 2021-12-06 | End: 2021-12-08 | Stop reason: HOSPADM

## 2021-12-06 RX ORDER — ALUMINA, MAGNESIA, AND SIMETHICONE 2400; 2400; 240 MG/30ML; MG/30ML; MG/30ML
15 SUSPENSION ORAL EVERY 6 HOURS PRN
Status: DISCONTINUED | OUTPATIENT
Start: 2021-12-06 | End: 2021-12-08 | Stop reason: HOSPADM

## 2021-12-06 RX ORDER — SODIUM CHLORIDE 0.9 % (FLUSH) 0.9 %
10 SYRINGE (ML) INJECTION EVERY 12 HOURS SCHEDULED
Status: DISCONTINUED | OUTPATIENT
Start: 2021-12-06 | End: 2021-12-08 | Stop reason: HOSPADM

## 2021-12-06 RX ORDER — CHOLECALCIFEROL (VITAMIN D3) 125 MCG
5 CAPSULE ORAL NIGHTLY PRN
Status: DISCONTINUED | OUTPATIENT
Start: 2021-12-06 | End: 2021-12-08 | Stop reason: HOSPADM

## 2021-12-06 RX ORDER — ONDANSETRON 2 MG/ML
4 INJECTION INTRAMUSCULAR; INTRAVENOUS EVERY 6 HOURS PRN
Status: DISCONTINUED | OUTPATIENT
Start: 2021-12-06 | End: 2021-12-08 | Stop reason: HOSPADM

## 2021-12-06 RX ADMIN — GADOTERIDOL 16 ML: 279.3 INJECTION, SOLUTION INTRAVENOUS at 18:09

## 2021-12-06 NOTE — ED PROVIDER NOTES
Subjective   Patient is a 70-year-old male complaining of weakness body wide but in particular his legs that developed over the past 3 days.  He states he is having difficulty ambulating and has been falling due to generalized weakness.  He denies headache cough fever chest pain shortness of breath vomiting or other complaint          Review of Systems  Negative for headache ears throat cough fever chest pain shortness of breath abdominal pain vomiting diarrhea dysuria is weight loss or other complaint.  A complete review of systems was obtained and is otherwise negative  Past Medical History:   Diagnosis Date   • Hyperlipidemia    • Hypertension    • IBS (irritable bowel syndrome)        No Known Allergies    No past surgical history on file.    Family History   Problem Relation Age of Onset   • Hypertension Mother    • Osteoporosis Mother    • Glaucoma Mother    • Macular degeneration Mother    • Heart failure Father    • Hypertension Father    • Obesity Father    • Hypertension Sister    • Stomach cancer Maternal Grandfather    • Diabetes Paternal Grandmother    • Pancreatic cancer Paternal Grandmother    • Aneurysm Paternal Grandfather        Social History     Socioeconomic History   • Marital status:    Tobacco Use   • Smoking status: Never Smoker   • Smokeless tobacco: Never Used   Vaping Use   • Vaping Use: Never used   Substance and Sexual Activity   • Alcohol use: Not Currently   • Drug use: Never   • Sexual activity: Defer           Objective   Physical Exam  Neurologic exam is nonfocal.  Patient has had generalized weakness in both legs.  He is having difficulty bearing weight.  HEENT exam shows TMs to be clear.  Oropharynx is clear moist sclerae nonicteric.  Neck has no adenopathy JVD or bruits.  Lungs are clear.  Heart has regular rate rhythm without murmur rub or gallop.  Chest is nontender.  Abdomen soft nontender.  Extremity exam is unremarkable.  Procedures           ED Course             Results for orders placed or performed during the hospital encounter of 12/06/21   Comprehensive Metabolic Panel    Specimen: Blood   Result Value Ref Range    Glucose 99 65 - 99 mg/dL    BUN 12 8 - 23 mg/dL    Creatinine 0.84 0.76 - 1.27 mg/dL    Sodium 140 136 - 145 mmol/L    Potassium 4.2 3.5 - 5.2 mmol/L    Chloride 102 98 - 107 mmol/L    CO2 21.0 (L) 22.0 - 29.0 mmol/L    Calcium 9.2 8.6 - 10.5 mg/dL    Total Protein 8.2 6.0 - 8.5 g/dL    Albumin 4.50 3.50 - 5.20 g/dL    ALT (SGPT) 29 1 - 41 U/L    AST (SGOT) 46 (H) 1 - 40 U/L    Alkaline Phosphatase 70 39 - 117 U/L    Total Bilirubin 0.4 0.0 - 1.2 mg/dL    eGFR Non African Amer 91 >60 mL/min/1.73    Globulin 3.7 gm/dL    A/G Ratio 1.2 g/dL    BUN/Creatinine Ratio 14.3 7.0 - 25.0    Anion Gap 17.0 (H) 5.0 - 15.0 mmol/L   CBC Auto Differential    Specimen: Blood   Result Value Ref Range    WBC 8.00 3.40 - 10.80 10*3/mm3    RBC 5.02 4.14 - 5.80 10*6/mm3    Hemoglobin 15.7 13.0 - 17.7 g/dL    Hematocrit 45.5 37.5 - 51.0 %    MCV 90.5 79.0 - 97.0 fL    MCH 31.3 26.6 - 33.0 pg    MCHC 34.6 31.5 - 35.7 g/dL    RDW 13.9 12.3 - 15.4 %    RDW-SD 43.8 37.0 - 54.0 fl    MPV 7.7 6.0 - 12.0 fL    Platelets 179 140 - 450 10*3/mm3    Neutrophil % 81.6 (H) 42.7 - 76.0 %    Lymphocyte % 8.8 (L) 19.6 - 45.3 %    Monocyte % 9.0 5.0 - 12.0 %    Eosinophil % 0.0 (L) 0.3 - 6.2 %    Basophil % 0.6 0.0 - 1.5 %    Neutrophils, Absolute 6.50 1.70 - 7.00 10*3/mm3    Lymphocytes, Absolute 0.70 0.70 - 3.10 10*3/mm3    Monocytes, Absolute 0.70 0.10 - 0.90 10*3/mm3    Eosinophils, Absolute 0.00 0.00 - 0.40 10*3/mm3    Basophils, Absolute 0.00 0.00 - 0.20 10*3/mm3    nRBC 0.1 0.0 - 0.2 /100 WBC     CT Head Without Contrast    Result Date: 12/6/2021  Normal study.  Electronically Signed By-Louie Mccain MD On:12/6/2021 3:10 PM This report was finalized on [id number] by  Louie Mccain MD.                                            MDM  Number of Diagnoses or Management Options  Diagnosis  management comments: Patient has a benign physical exam.  However trying to stand the patient and ambulate causes the patient to lose his balance and fall.  CT scan of his head was normal.  There is no evidence of metabolic abnormality or infectious process.  Patient will be brought in the hospital for further evaluation and probable rehab evaluation as well.  I did speak to the on-call hospitalist       Amount and/or Complexity of Data Reviewed  Clinical lab tests: reviewed  Tests in the radiology section of CPT®: reviewed    Risk of Complications, Morbidity, and/or Mortality  Presenting problems: high  Diagnostic procedures: high  Management options: high    Patient Progress  Patient progress: stable      Final diagnoses:   Weakness       ED Disposition  ED Disposition     ED Disposition Condition Comment    Decision to Admit            No follow-up provider specified.       Medication List      No changes were made to your prescriptions during this visit.          Gael Roth MD  12/06/21 1536

## 2021-12-06 NOTE — ED NOTES
Pt states he lost a lot of strength in his legs on Saturday and since then has had difficulty walking. Tingling in his hands. Symptoms, weakness and tingling got worse yesterday. Pt denies pain.      Hillary Irving RN  12/06/21 4873

## 2021-12-06 NOTE — H&P
[facility] Medicine Services      Patient Name: Emanuel Alcantara  : 1950  MRN: [id number]  Primary Care Physician:  Monty Henry MD  Date of admission: 2021      Subjective      Chief Complaint: bilateral leg weakness    History of Present Illness: Emanuel Alcantara is a 70 y.o. male with PMH of HTN, HLD who presented to [facility] ED 2021 with complaints of arm and leg weakness to wear he cannot walk. He stated he started having numbness in his feet and hands approximately 2 months ago. The numbness has not stopped him from his everyday activities but has been a nuisance. He was on a ladder Friday and cleaning gutters without difficulty. On Saturday two days ago he suddenly felt extreme weakness in legs. He felt like his thighs were weak and could not stand. Both of his arms have felt heavy as well. He gets worn out even putting on a shirt. He shakes when he tries to use his arms. He has fallen 2-3 times at home. He will stand up and then fall back on the couch because his legs are not strong enough to hold him up. He denied any neck pain, back pain, or known injury. He denied any history of any neck or back surgeries. He has had a sinus infection off and on, which is normal for him this time of year. He denied any known fever. He has not been vaccinated for Covid-19 or influenza.     In the ED the patient was very weak when standing and unable to walk. He had CT head that was negative for acute findings. Labs showed CO2 21, anion gap 17, AST 46. He was admitted for further neurological evaluation.       Review of Systems   HENT: Negative.    Eyes: Negative.    Cardiovascular: Negative.    Respiratory: Negative.    Endocrine: Negative.    Hematologic/Lymphatic: Negative.    Skin: Negative.    Musculoskeletal: Negative.    Gastrointestinal: Negative.    Genitourinary: Negative.    Neurological: Positive for disturbances in coordination, paresthesias and weakness.    Psychiatric/Behavioral: Negative.    Allergic/Immunologic: Negative.    All other systems reviewed and are negative.      Personal History     Past Medical History:   Diagnosis Date   • Hyperlipidemia    • Hypertension    • IBS (irritable bowel syndrome)        No past surgical history on file.    Family History: family history includes Aneurysm in his paternal grandfather; Diabetes in his paternal grandmother; Glaucoma in his mother; Heart failure in his father; Hypertension in his father, mother, and sister; Macular degeneration in his mother; Obesity in his father; Osteoporosis in his mother; Pancreatic cancer in his paternal grandmother; Stomach cancer in his maternal grandfather. Otherwise pertinent FHx was reviewed and not pertinent to current issue.    Social History:  reports that he has never smoked. He has never used smokeless tobacco. He reports previous alcohol use. He reports that he does not use drugs.    Home Medications:  Prior to Admission Medications     Prescriptions Last Dose Informant Patient Reported? Taking?    atenolol (TENORMIN) 25 MG tablet   No No    Take 1 tablet by mouth Daily.    clotrimazole-betamethasone (Lotrisone) 1-0.05 % cream   No No    Apply topically twice daily as needed    rosuvastatin (CRESTOR) 5 MG tablet   Yes No            Allergies:  No Known Allergies    Objective      Vitals:   Temp:  [97.7 °F (36.5 °C)] 97.7 °F (36.5 °C)  Heart Rate:  [] 96  Resp:  [16-18] 18  BP: (143-197)/() 143/96    Physical Exam  Vitals and nursing note reviewed.   HENT:      Head: Normocephalic and atraumatic.   Eyes:      Extraocular Movements: Extraocular movements intact.      Pupils: Pupils are equal, round, and reactive to light.   Cardiovascular:      Rate and Rhythm: Normal rate and regular rhythm.      Pulses: Normal pulses.      Heart sounds: Normal heart sounds.   Pulmonary:      Effort: Pulmonary effort is normal.      Breath sounds: Normal breath sounds.   Abdominal:       General: Bowel sounds are normal.      Palpations: Abdomen is soft.      Tenderness: There is no abdominal tenderness.   Musculoskeletal:      Cervical back: Normal range of motion.      Comments: Weakness of bilateral upper and lower extremities   Skin:     General: Skin is warm and dry.   Neurological:      Mental Status: He is alert.      Comments: Bilateral upper and lower extremity weakness  Hands shake when holding his arms up in the air. He is somewhat unsteady with finger-to-nose.  Bilateral legs have drift but left leg is weaker than right. He is ataxic on the left leg.   No slurred speech or facial droop  No sensory deficit on any extremity during exam other than subjective complaints of paresthesia to hands and feet   Psychiatric:         Mood and Affect: Mood normal.         Behavior: Behavior normal.          Result Review    Result Review:  I have personally reviewed the results from the time of this admission to 12/6/2021 17:02 EST and agree with these findings:  [x]  Laboratory  []  Microbiology  [x]  Radiology  []  EKG/Telemetry   []  Cardiology/Vascular   []  Pathology  [x]  Old records  []  Other:      Assessment/Plan        Active Hospital Problems:  Active Hospital Problems    Diagnosis    • **Bilateral leg weakness    • Bilateral arm weakness    • Paresthesia of foot, bilateral    • Paresthesia of hand, bilateral    • Ataxia    • Multiple falls    • Essential hypertension    • Mixed hyperlipidemia    • Benign prostatic hyperplasia      Plan:     Bilateral upper and lower extremity weakness and paresthesia  Ataxia  Multiple falls  -acute onset of weakness of upper and lower extremities. Unable to walk.  -CT head showed no acute findings  -MRI C-spine, check CK, UA, CRP  -consult neurology for further recommendations  -fall precautions, PT/OT eval in am  -neuro checks, [facility] admit    Hypertension  Hyperlipidemia  -resume home atenolol when verified. Hold statin for now due to weakness  above    DVT prophylaxis:  Mechanical DVT prophylaxis orders are present.    CODE STATUS:    Level Of Support Discussed With: Patient  Code Status (Patient has no pulse and is not breathing): CPR (Attempt to Resuscitate)  Medical Interventions (Patient has pulse or is breathing): Full Support    Admission Status:  I believe this patient meets observation status.    I discussed the patient's findings and my recommendations with patient and family.    This patient has been examined wearing appropriate Personal Protective Equipment 12/06/21      Signature: Electronically signed by ASHLYN Noguera, 12/06/21, 5:03 PM EST.

## 2021-12-07 ENCOUNTER — APPOINTMENT (OUTPATIENT)
Dept: MRI IMAGING | Facility: HOSPITAL | Age: 71
End: 2021-12-07

## 2021-12-07 PROBLEM — U07.1 COVID-19 VIRUS INFECTION: Status: ACTIVE | Noted: 2021-12-07

## 2021-12-07 PROBLEM — R53.1 WEAKNESS: Status: ACTIVE | Noted: 2021-12-07

## 2021-12-07 LAB
AMPHET+METHAMPHET UR QL: NEGATIVE
ANION GAP SERPL CALCULATED.3IONS-SCNC: 11 MMOL/L (ref 5–15)
BACTERIA UR QL AUTO: ABNORMAL /HPF
BARBITURATES UR QL SCN: NEGATIVE
BASOPHILS # BLD AUTO: 0 10*3/MM3 (ref 0–0.2)
BASOPHILS NFR BLD AUTO: 0.8 % (ref 0–1.5)
BENZODIAZ UR QL SCN: NEGATIVE
BILIRUB UR QL STRIP: NEGATIVE
BUN SERPL-MCNC: 14 MG/DL (ref 8–23)
BUN/CREAT SERPL: 18.7 (ref 7–25)
CALCIUM SPEC-SCNC: 8.7 MG/DL (ref 8.6–10.5)
CANNABINOIDS SERPL QL: NEGATIVE
CHLORIDE SERPL-SCNC: 103 MMOL/L (ref 98–107)
CLARITY UR: CLEAR
CO2 SERPL-SCNC: 23 MMOL/L (ref 22–29)
COCAINE UR QL: NEGATIVE
COLOR UR: YELLOW
CREAT SERPL-MCNC: 0.75 MG/DL (ref 0.76–1.27)
D DIMER PPP FEU-MCNC: 0.71 MG/L (FEU) (ref 0–0.59)
DEPRECATED RDW RBC AUTO: 43.8 FL (ref 37–54)
EOSINOPHIL # BLD AUTO: 0 10*3/MM3 (ref 0–0.4)
EOSINOPHIL NFR BLD AUTO: 0.3 % (ref 0.3–6.2)
ERYTHROCYTE [DISTWIDTH] IN BLOOD BY AUTOMATED COUNT: 14 % (ref 12.3–15.4)
GFR SERPL CREATININE-BSD FRML MDRD: 104 ML/MIN/1.73
GLUCOSE SERPL-MCNC: 99 MG/DL (ref 65–99)
GLUCOSE UR STRIP-MCNC: NEGATIVE MG/DL
GRAN CASTS URNS QL MICRO: ABNORMAL /LPF
HCT VFR BLD AUTO: 42.2 % (ref 37.5–51)
HGB BLD-MCNC: 14.9 G/DL (ref 13–17.7)
HGB UR QL STRIP.AUTO: ABNORMAL
HYALINE CASTS UR QL AUTO: ABNORMAL /LPF
KETONES UR QL STRIP: ABNORMAL
LEUKOCYTE ESTERASE UR QL STRIP.AUTO: NEGATIVE
LYMPHOCYTES # BLD AUTO: 1.2 10*3/MM3 (ref 0.7–3.1)
LYMPHOCYTES NFR BLD AUTO: 26.6 % (ref 19.6–45.3)
MCH RBC QN AUTO: 31.2 PG (ref 26.6–33)
MCHC RBC AUTO-ENTMCNC: 35.3 G/DL (ref 31.5–35.7)
MCV RBC AUTO: 88.5 FL (ref 79–97)
METHADONE UR QL SCN: NEGATIVE
MONOCYTES # BLD AUTO: 0.7 10*3/MM3 (ref 0.1–0.9)
MONOCYTES NFR BLD AUTO: 16.4 % (ref 5–12)
NEUTROPHILS NFR BLD AUTO: 2.5 10*3/MM3 (ref 1.7–7)
NEUTROPHILS NFR BLD AUTO: 55.9 % (ref 42.7–76)
NITRITE UR QL STRIP: NEGATIVE
NRBC BLD AUTO-RTO: 0.2 /100 WBC (ref 0–0.2)
OPIATES UR QL: NEGATIVE
OXYCODONE UR QL SCN: NEGATIVE
PH UR STRIP.AUTO: 5.5 [PH] (ref 5–8)
PLATELET # BLD AUTO: 182 10*3/MM3 (ref 140–450)
PMV BLD AUTO: 7.2 FL (ref 6–12)
POTASSIUM SERPL-SCNC: 3.8 MMOL/L (ref 3.5–5.2)
PROT UR QL STRIP: ABNORMAL
RBC # BLD AUTO: 4.77 10*6/MM3 (ref 4.14–5.8)
RBC # UR STRIP: ABNORMAL /HPF
REF LAB TEST METHOD: ABNORMAL
SODIUM SERPL-SCNC: 137 MMOL/L (ref 136–145)
SP GR UR STRIP: 1.03 (ref 1–1.03)
SQUAMOUS #/AREA URNS HPF: ABNORMAL /HPF
UROBILINOGEN UR QL STRIP: ABNORMAL
VIT B12 BLD-MCNC: 1324 PG/ML (ref 211–946)
WBC # UR STRIP: ABNORMAL /HPF
WBC CASTS #/AREA URNS LPF: ABNORMAL /LPF
WBC NRBC COR # BLD: 4.5 10*3/MM3 (ref 3.4–10.8)

## 2021-12-07 PROCEDURE — 99232 SBSQ HOSP IP/OBS MODERATE 35: CPT | Performed by: HOSPITALIST

## 2021-12-07 PROCEDURE — 82570 ASSAY OF URINE CREATININE: CPT | Performed by: NURSE PRACTITIONER

## 2021-12-07 PROCEDURE — 81050 URINALYSIS VOLUME MEASURE: CPT | Performed by: NURSE PRACTITIONER

## 2021-12-07 PROCEDURE — 85379 FIBRIN DEGRADATION QUANT: CPT | Performed by: HOSPITALIST

## 2021-12-07 PROCEDURE — 80307 DRUG TEST PRSMV CHEM ANLYZR: CPT | Performed by: NURSE PRACTITIONER

## 2021-12-07 PROCEDURE — 82175 ASSAY OF ARSENIC: CPT | Performed by: NURSE PRACTITIONER

## 2021-12-07 PROCEDURE — 70551 MRI BRAIN STEM W/O DYE: CPT

## 2021-12-07 PROCEDURE — 83874 ASSAY OF MYOGLOBIN: CPT | Performed by: NURSE PRACTITIONER

## 2021-12-07 PROCEDURE — 85025 COMPLETE CBC W/AUTO DIFF WBC: CPT | Performed by: NURSE PRACTITIONER

## 2021-12-07 PROCEDURE — 83655 ASSAY OF LEAD: CPT | Performed by: NURSE PRACTITIONER

## 2021-12-07 PROCEDURE — 80048 BASIC METABOLIC PNL TOTAL CA: CPT | Performed by: NURSE PRACTITIONER

## 2021-12-07 PROCEDURE — 25010000002 ENOXAPARIN PER 10 MG: Performed by: HOSPITALIST

## 2021-12-07 PROCEDURE — 97162 PT EVAL MOD COMPLEX 30 MIN: CPT

## 2021-12-07 PROCEDURE — 83825 ASSAY OF MERCURY: CPT | Performed by: NURSE PRACTITIONER

## 2021-12-07 PROCEDURE — 97166 OT EVAL MOD COMPLEX 45 MIN: CPT

## 2021-12-07 PROCEDURE — 99222 1ST HOSP IP/OBS MODERATE 55: CPT | Performed by: NURSE PRACTITIONER

## 2021-12-07 PROCEDURE — 81001 URINALYSIS AUTO W/SCOPE: CPT | Performed by: NURSE PRACTITIONER

## 2021-12-07 RX ORDER — ROSUVASTATIN CALCIUM 5 MG/1
5 TABLET, COATED ORAL EVERY OTHER DAY
Status: CANCELLED | OUTPATIENT
Start: 2021-12-07

## 2021-12-07 RX ADMIN — Medication 5 MG: at 20:16

## 2021-12-07 RX ADMIN — SODIUM CHLORIDE, PRESERVATIVE FREE 10 ML: 5 INJECTION INTRAVENOUS at 20:16

## 2021-12-07 RX ADMIN — ENOXAPARIN SODIUM 40 MG: 40 INJECTION SUBCUTANEOUS at 15:46

## 2021-12-07 RX ADMIN — SODIUM CHLORIDE, PRESERVATIVE FREE 10 ML: 5 INJECTION INTRAVENOUS at 08:09

## 2021-12-07 RX ADMIN — ATENOLOL 25 MG: 25 TABLET ORAL at 08:08

## 2021-12-07 RX ADMIN — SODIUM CHLORIDE, PRESERVATIVE FREE 10 ML: 5 INJECTION INTRAVENOUS at 04:00

## 2021-12-07 NOTE — CASE MANAGEMENT/SOCIAL WORK
Discharge Planning Assessment  [facility]     Patient Name: Emanuel Alcantara  MRN: [id number]  Today's Date: 12/7/2021    Admit Date: 12/6/2021     Discharge Needs Assessment     Row Name 12/07/21 0917       Living Environment    Lives With spouse    Current Living Arrangements home/apartment/condo    Quality of Family Relationships supportive    Able to Return to Prior Arrangements yes       Resource/Environmental Concerns    Transportation Concerns car, none       Transition Planning    Patient/Family Anticipates Transition to inpatient rehabilitation facility    Patient/Family Anticipated Services at Transition     Transportation Anticipated family or friend will provide       Discharge Needs Assessment    Readmission Within the Last 30 Days no previous admission in last 30 days    Equipment Currently Used at Home none               Discharge Plan     Row Name 12/07/21 0917       Plan    Plan D/C Plan : Pending PT/OT eval and neuro consult    Plan Comments confirmed PCP and pharmacy , spouse for transport at D/C              Continued Care and Services - Admitted Since 12/6/2021    Coordination has not been started for this encounter.          Demographic Summary     Row Name 12/07/21 0917       General Information    Admission Type observation    Arrived From emergency department    Required Notices Provided Observation Status Notice    Preferred Language English     Used During This Interaction no               Functional Status     Row Name 12/07/21 0917       Functional Status    Usual Activity Tolerance good    Current Activity Tolerance fair       Mental Status    General Appearance WDL WDL       Mental Status Summary    Recent Changes in Mental Status/Cognitive Functioning no changes                         Sissy Randall RN

## 2021-12-07 NOTE — THERAPY EVALUATION
Patient Name: Emanuel Alcantara  : 1950    MRN: [id number]                              Today's Date: 2021       Admit Date: 2021    Visit Dx:     ICD-10-CM ICD-9-CM   1. Weakness  R53.1 780.79     Patient Active Problem List   Diagnosis   • Allergic rhinitis   • Benign prostatic hyperplasia   • Mixed hyperlipidemia   • Essential hypertension   • Osteoarthritis   • Prostatitis, chronic   • Encounter for screening for malignant neoplasm of prostate   • Balanitis   • Direct inguinal hernia   • Medicare annual wellness visit, subsequent   • Nevus   • Dysplastic nevus   • Bilateral leg weakness   • Bilateral arm weakness   • Paresthesia of foot, bilateral   • Paresthesia of hand, bilateral   • Ataxia   • Multiple falls     Past Medical History:   Diagnosis Date   • Hyperlipidemia    • Hypertension    • IBS (irritable bowel syndrome)      No past surgical history on file.   General Information     Row Name 21 1046          Physical Therapy Time and Intention    Document Type evaluation  -EL     Mode of Treatment individual therapy  -     Row Name 21 1046          General Information    Patient Profile Reviewed yes  -EL     Prior Level of Function independent:; all household mobility; ADL's; driving  -     Row Name 21 1046          Living Environment    Lives With spouse  -     Row Name 21 1119          Home Main Entrance    Number of Stairs, Main Entrance none  -     Row Name 21 1119          Stairs Within Home, Primary    Number of Stairs, Within Home, Primary ten; other (see comments)  bedroom upstairs  -     Row Name 21 1119          Cognition    Orientation Status (Cognition) oriented x 4  -EL     Row Name 21 1119          Safety Issues, Functional Mobility    Impairments Affecting Function (Mobility) balance; coordination; endurance/activity tolerance; strength; pain; postural/trunk control  -EL           User Key  (r) = Recorded By, (t) = Taken  By, (c) = Cosigned By    Initials Name Provider Type    Isaiah Tomlinson, PT Physical Therapist               Mobility     Row Name 12/07/21 1121          Bed Mobility    Bed Mobility supine-sit  -EL     Supine-Sit [place] (Bed Mobility) minimum assist (75% patient effort)  -     Row Name 12/07/21 1121          Bed-Chair Transfer    Bed-Chair [place] (Transfers) minimum assist (75% patient effort)  -     Row Name 12/07/21 1121          Sit-Stand Transfer    Sit-Stand [place] (Transfers) minimum assist (75% patient effort)  -     Row Name 12/07/21 1121          Gait/Stairs (Locomotion)    [place] Level (Gait) minimum assist (75% patient effort)  -EL     Distance in Feet (Gait) 15  -EL     Deviations/Abnormal Patterns (Gait) gait speed decreased; stride length decreased; base of support, narrow  -EL           User Key  (r) = Recorded By, (t) = Taken By, (c) = Cosigned By    Initials Name Provider Type    Isaiah Tomlinson, EMIR Physical Therapist               Obj/Interventions     [place] Name 12/07/21 1126          Range of Motion Comprehensive    General Range of Motion bilateral lower extremity ROM WFL  -EL     Comment, General Range of Motion AROM WFL  -EL     Row Name 12/07/21 1126          Strength Comprehensive (MMT)    General Manual Muscle Testing (MMT) Assessment lower extremity strength deficits identified  -EL     Comment, General Manual Muscle Testing (MMT) Assessment Wolfgang hip flexion 3/5, knee extension 4/5 bilaterally. demonstrates postural weakness as well as difficulty holding head due to cervical weakness.  -     Row Name 12/07/21 1126          Balance    Balance Assessment sitting static balance; standing static balance; standing dynamic balance  -EL     Static Sitting Balance mild impairment  -EL     Static Standing Balance mild impairment  -EL     Dynamic Standing Balance moderate impairment  -     Row Name 12/07/21 1126          Sensory Assessment (Somatosensory)    Sensory  Assessment (Somatosensory) other (see comments)  reports new numbness and tingling in Wolfgang feet  -EL           User Key  (r) = Recorded By, (t) = Taken By, (c) = Cosigned By    Initials Name Provider Type    Isaiah Tomlinson PT Physical Therapist               Goals/Plan     Row Name 12/07/21 1136          Bed Mobility Goal 1 (PT)    Activity/Assistive Device (Bed Mobility Goal 1, PT) bed mobility activities, all  -EL     [place] Level/Cues Needed (Bed Mobility Goal 1, PT) modified independence  -EL     Time Frame (Bed Mobility Goal 1, PT) long term goal (LTG); 2 weeks  -EL     Row Name 12/07/21 1136          Transfer Goal 1 (PT)    Activity/Assistive Device (Transfer Goal 1, PT) transfers, all  -EL     [place] Level/Cues Needed (Transfer Goal 1, PT) independent  -EL     Time Frame (Transfer Goal 1, PT) long term goal (LTG); 2 weeks  -EL     Row Name 12/07/21 1136          Gait Training Goal 1 (PT)    Activity/Assistive Device (Gait Training Goal 1, PT) gait (walking locomotion)  -EL     [place] Level (Gait Training Goal 1, PT) supervision required  -EL     Distance (Gait Training Goal 1, PT) 200  -EL     Time Frame (Gait Training Goal 1, PT) long term goal (LTG); 2 weeks  -EL           User Key  (r) = Recorded By, (t) = Taken By, (c) = Cosigned By    Initials Name Provider Type    Isaiah Tomlinson PT Physical Therapist               Clinical Impression     Row Name 12/07/21 1130          Pain    Additional Documentation Pain Scale: FACES Pre/Post-Treatment (Group)  -     Row Name 12/07/21 1130          Pain Scale: FACES Pre/Post-Treatment    Pain: FACES Scale, Pretreatment 2-->hurts little bit  -EL     Posttreatment Pain Rating 2-->hurts little bit  -EL     Pain Location back  -EL     Row Name 12/07/21 1130          Plan of Care Review    Plan of Care Reviewed With patient  -EL     Outcome Summary Pt is a 69 YO M admitted with new onset of generalized weakness, c/o new numbness and tingling in BLE, few  recent falls, and is COVID +. Pt lives at home with spouse, typically very active and independent, ambulating without AD, continues to drive and states he likes to hike. This date pt demonstrates fair functional mobility, amblating short distance with MIN A, pt with impaired balance, but states it has improved over the last few days. Pt with proximal weakness in BLE and BUE as well as postural instability, having LOB in sitting when resisting MMT with UE. Pt with FFP and states he has had difficulty holding his head up. Neurologist on board and PT reached out via epic chat to discuss findings with them. Recommendation is IP rehab following d/c due to increased falls risk as well as decreased functional mobility.  -EL     Row Name 12/07/21 1130          Therapy Assessment/Plan (PT)    Rehab Potential (PT) good, to achieve stated therapy goals  -EL     Criteria for Skilled Interventions Met (PT) yes  -EL     Predicted Duration of Therapy Intervention (PT) until d/c  -EL     Row Name 12/07/21 1130          Vital Signs    O2 Delivery Pre Treatment room air  -EL     O2 Delivery Intra Treatment room air  -EL     O2 Delivery Post Treatment room air  -EL     Pre Patient Position Supine  -EL     Intra Patient Position Standing  -EL     Post Patient Position Sitting  -EL     Row Name 12/07/21 1130          Positioning and Restraints    Pre-Treatment Position in bed  -EL     Post Treatment Position chair  -EL     In Chair notified nsg; reclined; call light within reach; encouraged to call for assist; exit alarm on  -EL           User Key  (r) = Recorded By, (t) = Taken By, (c) = Cosigned By    Initials Name Provider Type    EL Isaiah Dey, PT Physical Therapist               Outcome Measures     Row Name 12/07/21 1130          How much help from another person do you currently need...    Turning from your back to your side while in flat bed without using bedrails? 3  -EL     Moving from lying on back to sitting on the side of a  flat bed without bedrails? 3  -EL     Moving to and from a bed to a chair (including a wheelchair)? 3  -EL     Standing up from a chair using your arms (e.g., wheelchair, bedside chair)? 3  -EL     Climbing 3-5 steps with a railing? 2  -EL     To walk in hospital room? 3  -EL     AM-PAC 6 Clicks Score (PT) 17  -     Row Name 12/07/21 1138          Modified [place] Scale    Pre-Stroke Modified Teresa Scale 0 - No Symptoms at all.  -EL     Modified Teresa Scale 4 - Moderately severe disability.  Unable to walk without assistance, and unable to attend to own bodily needs without assistance.  -     Row Name 12/07/21 1138 12/07/21 1137       Functional Assessment    Outcome Measure Options Modified Teresa  - AM-PAC 6 Clicks Basic Mobility (PT)  -          User Key  (r) = Recorded By, (t) = Taken By, (c) = Cosigned By    Initials Name Provider Type    Isaiah Tomlinson PT Physical Therapist                             Physical Therapy Education                 Title: PT OT SLP Therapies (Done)     Topic: Physical Therapy (Done)     Point: Mobility training (Done)     Learning Progress Summary           Patient Acceptance, E,TB, VU by  at 12/7/2021 1137                   Point: Precautions (Done)     Learning Progress Summary           Patient Acceptance, E,TB, VU by  at 12/7/2021 1137                               User Key     Initials Effective Dates Name Provider Type [facility] 06/23/20 -  Isaiah Dey, PT Physical Therapist PT              PT Recommendation and Plan  Planned Therapy Interventions (PT): balance training, neuromuscular re-education, bed mobility training, transfer training, gait training, patient/family education, strengthening  Plan of Care Reviewed With: patient  Outcome Summary: Pt is a 71 YO M admitted with new onset of generalized weakness, c/o new numbness and tingling in BLE, few recent falls, and is COVID +. Pt lives at home with spouse, typically very active and independent,  ambulating without AD, continues to drive and states he likes to hike. This date pt demonstrates fair functional mobility, amblating short distance with MIN A, pt with impaired balance, but states it has improved over the last few days. Pt with proximal weakness in BLE and BUE as well as postural instability, having LOB in sitting when resisting MMT with UE. Pt with FFP and states he has had difficulty holding his head up. Neurologist on board and PT reached out via epic chat to discuss findings with them. Recommendation is IP rehab following d/c due to increased falls risk as well as decreased functional mobility.     Time Calculation:    PT Charges     Row Name 12/07/21 1138             Time Calculation    Start Time 0844  -EL      Stop Time 0914  -EL      Time Calculation (min) 30 min  -EL      PT Received On 12/07/21  -EL      PT - Next Appointment 12/08/21  -EL      PT Goal Re-Cert Due Date 12/21/21  -EL            User Key  (r) = Recorded By, (t) = Taken By, (c) = Cosigned By    Initials Name Provider Type    Isaiah Tomlinson PT Physical Therapist              Therapy Charges for Today     Code Description Service Date Service Provider Modifiers Qty    [id number]  PT EVAL MOD COMPLEXITY 4 12/7/2021 Isaiah Dey, PT GP 1          PT G-Codes  Outcome Measure Options: Modified Teresa  AM-PAC 6 Clicks Score (PT): 17  Modified [place] Scale: 4 - Moderately severe disability.  Unable to walk without assistance, and unable to attend to own bodily needs without assistance.    Isaiah Dey PT  12/7/2021

## 2021-12-07 NOTE — PROGRESS NOTES
[facility] Medicine Services Daily Progress Note    Patient Name: Emanuel Alcantara  : 1950  MRN: [id number]  Primary Care Physician:  Monty Henry MD  Date of admission: 2021      Subjective      Chief Complaint: Weakness.      Patient Reports     21: Denies fevers or cough.  PT/OT consulted.  Neurology consulted.  MRI of the brain ordered.    Review of Systems   All other systems reviewed and are negative.        Objective      Vitals:   Temp:  [98 °F (36.7 °C)-98.6 °F (37 °C)] 98 °F (36.7 °C)  Heart Rate:  [72-96] 83  Resp:  [18-20] 20  BP: (140-162)/(66-99) 143/66    Physical Exam  HENT:      Head: Normocephalic.   Eyes:      General: No scleral icterus.     Extraocular Movements: Extraocular movements intact.      Pupils: Pupils are equal, round, and reactive to light.   Cardiovascular:      Rate and Rhythm: Normal rate and regular rhythm.   Pulmonary:      Effort: Pulmonary effort is normal.   Abdominal:      General: Bowel sounds are normal.   Musculoskeletal:         General: Normal range of motion.      Cervical back: Normal range of motion.   Skin:     General: Skin is warm.   Neurological:      Mental Status: He is alert. Mental status is at baseline.   Psychiatric:         Mood and Affect: Mood normal.           Result Review    Result Review:  I have personally reviewed the results from the time of this admission to 2021 14:41 EST and agree with these findings:  [x]  Laboratory  []  Microbiology  [x]  Radiology  []  EKG/Telemetry   []  Cardiology/Vascular   []  Pathology  [x]  Old records  []  Other:            Assessment/Plan      Brief Patient Summary:  70-year-old male with COVID-19 infection and complaints of proximal hip weakness      atenolol, 25 mg, Oral, Q24H  enoxaparin, 40 mg, Subcutaneous, Q24H  sodium chloride, 10 mL, Intravenous, Q12H             Active Hospital Problems:  Active Hospital Problems    Diagnosis    • **Bilateral leg weakness     • COVID-19 virus infection    • Bilateral arm weakness    • Paresthesia of hand, bilateral    • Ataxia    • Multiple falls    • Paresthesia of foot, bilateral    • Essential hypertension    • Mixed hyperlipidemia    • Benign prostatic hyperplasia      COVID-19 infection:  -Patient without hypoxemia or fever      Bilateral upper and lower extremity weakness and paresthesia  Ataxia  Multiple falls  -acute onset of weakness of upper and lower extremities. Unable to walk.  -CT head showed no acute findings  -s/p MRI of the cervical spine  -MRI of brain pending  -consult neurology   -PT/ OT consult     Hypertension  Hyperlipidemia  -resume home atenolol   -Patient had intolerance to statin in March 2021         DVT prophylaxis:  Medical and mechanical DVT prophylaxis orders are present.    CODE STATUS:    Level Of Support Discussed With: Patient  Code Status (Patient has no pulse and is not breathing): CPR (Attempt to Resuscitate)  Medical Interventions (Patient has pulse or is breathing): Full Support      Disposition:  I expect patient to be discharged defer.    This patient has been examined wearing appropriate Personal Protective Equipment and discussed with nursing. 12/07/21      Electronically signed by Ari Lebron DO, 12/07/21, 14:41 EST.  [facility] Hospitalist [facility]

## 2021-12-07 NOTE — PLAN OF CARE
Goal Outcome Evaluation:  Plan of Care Reviewed With: patient           Outcome Summary: 71 y/o male presenting to [facility] with new onset of generalized weakness, c/o new numbness and tingling in BLE, several recent falls, and COVID +. Pt lives woth spouse in a 2 story home with room on second floor. Pt reports independent PLOF with ADLs/IADLS (driving, hiking). At time of evaluation. pt oriented x4 and presenting with proximal weakness of BUE and neck. Pt required min A for sit to stand, functional mobility short distance, and UB dressing. Pt with weakn cervical extension muscles and required cues to hold head up. Pt with poor sitting balance at EOB for UE MMT and required A from therapist to maintain static sitting balance. Pt with proximal weakness, poor balance, and is at high risk for falls. OT recommending IP rehab at d/c. OT will follow at [facility].

## 2021-12-07 NOTE — PLAN OF CARE
Goal Outcome Evaluation:  Plan of Care Reviewed With: patient     Progress: improving  Outcome Summary: Patient admitted to [facility] with + covid test and new onset of weakness x3 days. States he has fallen multiple times in the last 3-4 days and loses balance. Patients balance unsteady and legs weak. Exhausted just from ambulation to BSC. Currently on room air and asymptomatic for covid-19. Vitals stable. Neuro consulted. Plan of care ongoing      Problem: Adult Inpatient Plan of Care  Goal: Absence of Hospital-Acquired Illness or Injury  Outcome: Ongoing, Progressing  Intervention: Identify and Manage Fall Risk  Recent Flowsheet Documentation  Taken 12/7/2021 1245 by Nesha Cartwright RN  Safety Promotion/Fall Prevention:   safety round/check completed   nonskid shoes/slippers when out of bed   fall prevention program maintained   clutter free environment maintained   assistive device/personal items within reach   activity supervised  Taken 12/7/2021 1050 by Nesha Cartwright RN  Safety Promotion/Fall Prevention: safety round/check completed  Taken 12/7/2021 1037 by Nesha Cartwright RN  Safety Promotion/Fall Prevention:   safety round/check completed   nonskid shoes/slippers when out of bed   fall prevention program maintained   clutter free environment maintained   assistive device/personal items within reach   activity supervised  Taken 12/7/2021 0809 by Nesha Cartwright RN  Safety Promotion/Fall Prevention:   safety round/check completed   fall prevention program maintained   clutter free environment maintained   assistive device/personal items within reach   activity supervised   nonskid shoes/slippers when out of bed  Intervention: Prevent Skin Injury  Recent Flowsheet Documentation  Taken 12/7/2021 1245 by Nesha Cartwright RN  Body Position: position changed independently  Taken 12/7/2021 0809 by Nesha Cartwright RN  Body Position: position changed independently  Skin Protection: adhesive use  limited  Intervention: Prevent and Manage VTE (venous thromboembolism) Risk  Recent Flowsheet Documentation  Taken 12/7/2021 0809 by Nesha Cartwright RN  VTE Prevention/Management: bleeding risk factor(s) identified  Intervention: Prevent Infection  Recent Flowsheet Documentation  Taken 12/7/2021 1245 by Nesha Cartwright RN  Infection Prevention:   single patient room provided   rest/sleep promoted   personal protective equipment utilized   hand hygiene promoted  Taken 12/7/2021 1037 by Nesha Cartwright RN  Infection Prevention:   single patient room provided   rest/sleep promoted   personal protective equipment utilized   hand hygiene promoted  Taken 12/7/2021 0809 by Nesha Cartwright RN  Infection Prevention:   single patient room provided   rest/sleep promoted   personal protective equipment utilized   hand hygiene promoted

## 2021-12-07 NOTE — THERAPY EVALUATION
Patient Name: Emanuel Alcantara  : 1950    MRN: [id number]                              Today's Date: 2021       Admit Date: 2021    Visit Dx:     ICD-10-CM ICD-9-CM   1. Weakness  R53.1 780.79     Patient Active Problem List   Diagnosis   • Allergic rhinitis   • Benign prostatic hyperplasia   • Mixed hyperlipidemia   • Essential hypertension   • Osteoarthritis   • Prostatitis, chronic   • Encounter for screening for malignant neoplasm of prostate   • Balanitis   • Direct inguinal hernia   • Medicare annual wellness visit, subsequent   • Nevus   • Dysplastic nevus   • Bilateral leg weakness   • Bilateral arm weakness   • Paresthesia of foot, bilateral   • Paresthesia of hand, bilateral   • Ataxia   • Multiple falls     Past Medical History:   Diagnosis Date   • Hyperlipidemia    • Hypertension    • IBS (irritable bowel syndrome)      No past surgical history on file.   General Information     Row Name 21 1148          OT Time and Intention    Document Type evaluation  -     Mode of Treatment occupational therapy  -     Row Name 21 1148          General Information    Patient Profile Reviewed yes  -BL     Prior Level of Function independent:; all household mobility; ADL's; driving  -     Existing Precautions/Restrictions fall  -     Barriers to Rehab medically complex  -     Row Name 21 1148          Living Environment    Lives With spouse  -     Row Name 21 1148          Home Main Entrance    Number of Stairs, Main Entrance none  -     Row Name 21 1148          Cognition    Orientation Status (Cognition) oriented x 4  -     Row Name 21 1148          Safety Issues, Functional Mobility    Impairments Affecting Function (Mobility) balance; coordination; endurance/activity tolerance; strength; pain; postural/trunk control  -           User Key  (r) = Recorded By, (t) = Taken By, (c) = Cosigned By    Initials Name Provider Type    BL Heather Dodd  OT Occupational Therapist                 Mobility/ADL's     Row Name 12/07/21 1150          Bed Mobility    Bed Mobility supine-sit  -     Supine-Sit [place] (Bed Mobility) minimum assist (75% patient effort)  -\[facility]\"" Name 12/07/21 1150          Transfers    Bed-Chair [place] (Transfers) minimum assist (75% patient effort)  -     Sit-Stand [place] (Transfers) minimum assist (75% patient effort)  -\[facility]\"" Name 12/07/21 1150          Activities of Daily Living    BADL Assessment/Intervention upper body dressing  -\[facility]\"" Name 12/07/21 1150          Upper Body Dressing Assessment/Training    [place] Level (Upper Body Dressing) minimum assist (75% patient effort); doff; don; front opening garment  -     Position (Upper Body Dressing) edge of bed sitting  -           User Key  (r) = Recorded By, (t) = Taken By, (c) = Cosigned By    Initials Name Provider Type     Heather Dodd OT Occupational Therapist               Obj/Interventions     [facility] Name 12/07/21 1151          Sensory Assessment (Somatosensory)    Sensory Assessment (Somatosensory) other (see comments)  reports new numbness and tingling in B feet  -\[facility]\"" Name 12/07/21 1151          Vision Assessment/Intervention    Visual Impairment/Limitations corrective lenses for reading  -\[facility]\"" Name 12/07/21 1151          Range of Motion Comprehensive    General Range of Motion bilateral upper extremity ROM WFL  -\[facility]\"" Name 12/07/21 1151          Strength Comprehensive (MMT)    Comment, General Manual Muscle Testing (MMT) Assessment B shoulder flexion 3/5; B elbow extension/flexion 3+/5; B  strength 4/5  -\[facility]\"" Name 12/07/21 1151          Motor Skills    Motor Skills coordination  -\[facility]\"" Name 12/07/21 1151          Balance    Balance Assessment sitting static balance; sitting dynamic balance; standing static balance; standing dynamic balance  -     Static Sitting Balance mild impairment; sitting,  edge of bed  -BL     Dynamic Sitting Balance sitting, edge of bed; mild impairment  -BL     Static Standing Balance mild impairment  -BL     Dynamic Standing Balance moderate impairment  -BL           User Key  (r) = Recorded By, (t) = Taken By, (c) = Cosigned By    Initials Name Provider Type    Heather Marin OT Occupational Therapist               Goals/Plan     Row Name 12/07/21 1200          Transfer Goal 1 (OT)    Activity/Assistive Device (Transfer Goal 1, OT) transfers, all  -BL     [place] Level/Cues Needed (Transfer Goal 1, OT) contact guard assist  -BL     Time Frame (Transfer Goal 1, OT) long term goal (LTG); 2 weeks  -     Row Name 12/07/21 1200          Dressing Goal 1 (OT)    Activity/Device (Dressing Goal 1, OT) dressing skills, all  -BL     [place]/Cues Needed (Dressing Goal 1, OT) contact guard assist  -BL     Time Frame (Dressing Goal 1, OT) long term goal (LTG); 2 weeks  -     Row Name 12/07/21 1200          Grooming Goal 1 (OT)    Activity/Device (Grooming Goal 1, OT) grooming skills, all  -BL     [place] (Grooming Goal 1, OT) contact guard assist  -BL     Time Frame (Grooming Goal 1, OT) long term goal (LTG); 2 weeks  -BL     Row Name 12/07/21 1200          Therapy Assessment/Plan (OT)    Planned Therapy Interventions (OT) activity tolerance training; BADL retraining; IADL retraining; transfer/mobility retraining; patient/caregiver education/training; strengthening exercise; ROM/therapeutic exercise  -           User Key  (r) = Recorded By, (t) = Taken By, (c) = Cosigned By    Initials Name Provider Type    Heather Marin OT Occupational Therapist               Clinical Impression     Row Name 12/07/21 1159          Pain Assessment    Additional Documentation Pain Scale: FACES Pre/Post-Treatment (Group)  -     Row Name 12/07/21 1153          Pain Scale: Numbers Pre/Post-Treatment    Pain Intervention(s) Medication (See MAR); Repositioned; Emotional support   -     Row Name 12/07/21 1154          Pain Scale: FACES Pre/Post-Treatment    Pain: FACES Scale, Pretreatment 2-->hurts little bit  -BL     Posttreatment Pain Rating 2-->hurts little bit  -BL     Pain Location back  -BL     Row Name 12/07/21 1154          Plan of Care Review    Plan of Care Reviewed With patient  -BL     Outcome Summary 71 y/o male presenting to [facility] with new onset of generalized weakness, c/o new numbness and tingling in BLE, several recent falls, and COVID +. Pt lives woth spouse in a 2 story home with room on second floor. Pt reports independent PLOF with ADLs/IADLS (driving, hiking). At time of evaluation. pt oriented x4 and presenting with proximal weakness of BUE and neck. Pt required min A for sit to stand, functional mobility short distance, and UB dressing. Pt with weakn cervical extension muscles and required cues to hold head up. Pt with poor sitting balance at EOB for UE MMT and required A from therapist to maintain static sitting balance. Pt with proximal weakness, poor balance, and is at high risk for falls. OT recommending IP rehab at d/c. OT will follow at [facility].  -     Row Name 12/07/21 1154          Therapy Assessment/Plan (OT)    Therapy Frequency (OT) 5 times/wk  -     Row Name 12/07/21 1154          Therapy Plan Review/Discharge Plan (OT)    Anticipated Discharge Disposition (OT) inpatient rehabilitation facility  -     Row Name 12/07/21 1154          Vital Signs    O2 Delivery Pre Treatment room air  -BL     O2 Delivery Intra Treatment room air  -BL     O2 Delivery Post Treatment room air  -     Row Name 12/07/21 1154          Positioning and Restraints    Pre-Treatment Position in bed  -BL     Post Treatment Position chair  -BL     In Chair notified nsg; call light within reach; encouraged to call for assist; exit alarm on  -BL           User Key  (r) = Recorded By, (t) = Taken By, (c) = Cosigned By    Initials Name Provider Type    BL Heather Dodd, DAVID Occupational  Therapist               Outcome Measures     Row Name 12/07/21 1137          How much help from another person do you currently need...    Turning from your back to your side while in flat bed without using bedrails? 3  -EL     Moving from lying on back to sitting on the side of a flat bed without bedrails? 3  -EL     Moving to and from a bed to a chair (including a wheelchair)? 3  -EL     Standing up from a chair using your arms (e.g., wheelchair, bedside chair)? 3  -EL     Climbing 3-5 steps with a railing? 2  -EL     To walk in hospital room? 3  -EL     AM-PAC 6 Clicks Score (PT) 17  -EL     Row Name 12/07/21 1138          Modified [place] Scale    Pre-Stroke Modified [place] Scale 0 - No Symptoms at all.  -EL     Modified Teresa Scale 4 - Moderately severe disability.  Unable to walk without assistance, and unable to attend to own bodily needs without assistance.  -EL     Row Name 12/07/21 1138 12/07/21 1137       Functional Assessment    Outcome Measure Options Modified Teresa  -EL AM-PAC 6 Clicks Basic Mobility (PT)  -EL          User Key  (r) = Recorded By, (t) = Taken By, (c) = Cosigned By    Initials Name Provider Type    Isaiah Tomlinson PT Physical Therapist                Occupational Therapy Education                 Title: PT OT SLP Therapies (Done)     Topic: Occupational Therapy (Done)     Point: ADL training (Done)     Description:   Instruct learner(s) on proper safety adaptation and remediation techniques during self care or transfers.   Instruct in proper use of assistive devices.              Learning Progress Summary           Patient Acceptance, E,TB, VU by BL at 12/7/2021 1201                   Point: Precautions (Done)     Description:   Instruct learner(s) on prescribed precautions during self-care and functional transfers.              Learning Progress Summary           Patient Acceptance, E,TB, VU by BL at 12/7/2021 1201                   Point: Body mechanics (Done)     Description:    Instruct learner(s) on proper positioning and spine alignment during self-care, functional mobility activities and/or exercises.              Learning Progress Summary           Patient Acceptance, E,TB, VU by  at 12/7/2021 1201                               User Key     Initials Effective Dates Name Provider Type Discipline     04/13/21 -  Heather Dodd OT Occupational Therapist OT              OT Recommendation and Plan  Planned Therapy Interventions (OT): activity tolerance training, BADL retraining, IADL retraining, transfer/mobility retraining, patient/caregiver education/training, strengthening exercise, ROM/therapeutic exercise  Therapy Frequency (OT): 5 times/wk  Plan of Care Review  Plan of Care Reviewed With: patient  Outcome Summary: 69 y/o male presenting to [facility] with new onset of generalized weakness, c/o new numbness and tingling in BLE, several recent falls, and COVID +. Pt lives woth spouse in a 2 story home with room on second floor. Pt reports independent PLOF with ADLs/IADLS (driving, hiking). At time of evaluation. pt oriented x4 and presenting with proximal weakness of BUE and neck. Pt required min A for sit to stand, functional mobility short distance, and UB dressing. Pt with weakn cervical extension muscles and required cues to hold head up. Pt with poor sitting balance at EOB for UE MMT and required A from therapist to maintain static sitting balance. Pt with proximal weakness, poor balance, and is at high risk for falls. OT recommending IP rehab at d/c. OT will follow at [facility].     Time Calculation:    Time Calculation- OT     Row Name 12/07/21 1202             Time Calculation- OT    OT Start Time 0845  -      OT Stop Time 0911  -      OT Time Calculation (min) 26 min  -BL      OT Received On 12/07/21  -      OT - Next Appointment 12/08/21  -      OT Goal Re-Cert Due Date 12/21/21  -            User Key  (r) = Recorded By, (t) = Taken By, (c) = Cosigned By    Initials Name  Provider Type    BL Levi Connell OT Occupational Therapist              Therapy Charges for Today     Code Description Service Date Service Provider Modifiers Qty    [id number] HC OT EVAL MOD COMPLEXITY 4 12/7/2021 Levi Connell OT GO 1               LEVI CONNELL OT  12/7/2021

## 2021-12-07 NOTE — PLAN OF CARE
Goal Outcome Evaluation:  Plan of Care Reviewed With: patient           Outcome Summary: Pt is a 71 YO M admitted with new onset of generalized weakness, c/o new numbness and tingling in BLE, few recent falls, and is COVID +. Pt lives at home with spouse, typically very active and independent, ambulating without AD, continues to drive and states he likes to hike. This date pt demonstrates fair functional mobility, amblating short distance with MIN A, pt with impaired balance, but states it has improved over the last few days. Pt with proximal weakness in BLE and BUE as well as postural instability, having LOB in sitting when resisting MMT with UE. Pt with FFP and states he has had difficulty holding his head up. Neurologist on board and PT reached out via epic chat to discuss findings with them. Recommendation is IP rehab following d/c due to increased falls risk as well as decreased functional mobility.

## 2021-12-07 NOTE — CASE MANAGEMENT/SOCIAL WORK
Continued Stay Note  LENNIE Butterfield     Patient Name: Emanuel Alcantara  MRN: [id number]  Today's Date: 12/7/2021    Admit Date: 12/6/2021     Discharge Plan     Row Name 12/07/21 0919       Plan    Plan Comments Pt is also COVID-19 + and is on R/A    Row Name 12/07/21 0917       Plan    Plan D/C Plan : Pending PT/OT eval and neuro consult    Plan Comments confirmed PCP and pharmacy , spouse for transport at D/C               Discharge Codes    No documentation.                     Sissy Randall RN

## 2021-12-07 NOTE — CONSULTS
Primary Care Provider: Monty Henry MD     Consult requested by: ASHLYN Noguera    Reason for Consultation: Neurological evaluation     History taken from: patient chart RN    Chief complaint: arm and leg weakness        SUBJECTIVE:    History of present illness: Background per H&P: Mr. Alcantara is a 70 y.o. male with PMH of HTN, HLD who presented to [facility] ED 12/6/2021 with complaints of arm and leg weakness to wear he cannot walk. He stated he started having numbness in his feet and hands approximately 2 months ago. The numbness has not stopped him from his everyday activities, but has been a nuisance. He was on a ladder Friday and cleaning gutters without difficulty. On Saturday two days ago he suddenly felt extreme weakness in legs. He felt like his thighs were weak and could not stand. Both of his arms have felt heavy as well. He gets worn out even putting on a shirt. He shakes when he tries to use his arms. He has fallen 2-3 times at home. He will stand up and then fall back on the couch because his legs are not strong enough to hold him up. He denied any neck pain, back pain, or known injury. He denied any history of any neck or back surgeries. He has had a sinus infection off and on, which is normal for him this time of year. He denied any known fever. He has not been vaccinated for Covid-19 or influenza.      In the ED the patient was very weak when standing and unable to walk. He had CT head that was negative for acute findings. Labs showed CO2 21, anion gap 17, AST 46. He was admitted for further neurological evaluation.      - Portions of the above HPI were copied from previous encounters and edited as appropriate.    Pt states he has had numbness in his feet since September. The left foot numbness extends up to almost the knee and is worse than the left. No notable weakness in the feet. This has been constant and mostly unchanged since September. No hx DM, chemo, radiation, or vitamin  deficiency that he is aware of.     Pt states he has been strong without weakness and was working on the house on Friday. He did not feel weak at all at that time. On Saturday, he felt weak and thought it was due to working too hard of Friday. He started to have nasal congestion and a sore throat on Saturday which he contributed to his chronic allergies. On Sunday, he states he was so weak that he could not get out of bed. He reports the weakness is proximal and he has little to no weakness in the hands or feet. No bowel or bladder symptoms. He has difficulty holding his head up as well. No diplopia, dysphagia, speech changes, vertigo. He has never had issues with weakness or fatigability prior to this episode. No falls/injury or neck/back pain. He is not vaccinated for Covid. He does not feel particularly ill and states his sinus issues have improved since Saturday. No SOA and no cough currently.     Review of Systems   Constitutional: Negative for chills, diaphoresis and fatigue.   Eyes: Negative for photophobia and visual disturbance.   Musculoskeletal: Positive for gait problem. Negative for arthralgias, back pain, joint swelling, myalgias, neck pain and neck stiffness.   Neurological: Positive for weakness and numbness. Negative for dizziness, tremors, seizures, syncope, facial asymmetry, speech difficulty, light-headedness and headaches.            PATIENT HISTORY:  Past Medical History:   Diagnosis Date   • Hyperlipidemia    • Hypertension    • IBS (irritable bowel syndrome)    , No past surgical history on file.,   Family History   Problem Relation Age of Onset   • Hypertension Mother    • Osteoporosis Mother    • Glaucoma Mother    • Macular degeneration Mother    • Heart failure Father    • Hypertension Father    • Obesity Father    • Hypertension Sister    • Stomach cancer Maternal Grandfather    • Diabetes Paternal Grandmother    • Pancreatic cancer Paternal Grandmother    • Aneurysm Paternal Grandfather     ,   Social History     Tobacco Use   • Smoking status: Never Smoker   • Smokeless tobacco: Never Used   Vaping Use   • Vaping Use: Never used   Substance Use Topics   • Alcohol use: Not Currently   • Drug use: Never   ,   Medications Prior to Admission   Medication Sig Dispense Refill Last Dose   • atenolol (TENORMIN) 25 MG tablet Take 1 tablet by mouth Daily. 90 tablet 3    • clotrimazole-betamethasone (Lotrisone) 1-0.05 % cream Apply topically twice daily as needed 45 g 5    • rosuvastatin (CRESTOR) 5 MG tablet       , Scheduled Meds:  atenolol, 25 mg, Oral, Q24H  sodium chloride, 10 mL, Intravenous, Q12H    , Continuous Infusions:   , PRN Meds:  •  acetaminophen **OR** acetaminophen **OR** acetaminophen  •  aluminum-magnesium hydroxide-simethicone  •  labetalol  •  melatonin  •  ondansetron **OR** ondansetron  •  [COMPLETED] Insert peripheral IV **AND** sodium chloride  •  sodium chloride, Allergies:  Patient has no known allergies.    ________________________________________________________        OBJECTIVE:  Upon today's exam, pt is awake and alert. Very pleasant, cooperative. Sitting in BS chair with legs elevated.       Neurologic Exam  PHYSICAL EXAM:    CONSTITUTIONAL: The patient is in no apparent distress, bright awake and alert. There is no shortness of breath.     HEENT: There is no tenderness over the temporal arteries bilaterally. Normocephalic, atraumatic. TMJ open symmetrically without tenderness.    NECK: ROM normal, supple    RESPIRATORY: Breathing unlabored, Breath sounds are normal. No noted weakness of the respiratory muscles.     CARDIAC: Regular rate and rhythm.     EXTREMITIES:  No clubbing, cyanosis or edema.    PSYCHIATRIC: Mood/affect normal, judgement normal, appropriate    NEUROLOGICAL:    Cognition:   Fully oriented.  Fund of knowledge excellent.  Concentration and attention normal.   Language normal with normal comprehension, fluent speech, intact repetition and naming.   Short and  long term memory appears intact    Cranial nerves;    II - pupils bilaterally equal reacting to light,  No new Visual field deficits;  Fundoscopic exam- Not able to be done, non-dilated exam  III,IV,VI: EOMI with no diplopia. No diplopia or ptosis with prolonged upward gaze  V: Normal facial sensations  VII: No facial asymmetry,  VIII: No New hearing abnormality  IX, X, XI: normal gag and shoulder shrug;  XII: tongue is in the midline.    Sensory:  Intact to light touch in all extremities except as detailed:   Decreased sensation to light touch in the L-5 distribution on the LLE and on the dorsal area of the right foot.   No vibration sensation or temperature on the right big toe and foot; decreased on the ankle; intact at the knee and shin.   No vibration sensation and temperature on the left big toe, ankle, shin; decreased at the knee.    Sensation, vibration, and temperature intact above the knee and throughout otherwise.       Motor: Strength: RUE proximal 3/5 distal 5-/5, LUE proximal 3/5 distal 5-/5, RLE Proximal 3/5 distal 5/5, LLE proximal 3-/5 distal 5-/5.  No involuntary movements present. Normal tone and bulk.  Deep tendon reflexes: 0/4. Both plantars are mute.    Cerebellar: Finger to nose and mirror movements normal bilaterally.    Gait and balance: unable to assess due to weakness    ________________________________________________________   RESULTS REVIEW:    VITAL SIGNS:   Temp:  [97.7 °F (36.5 °C)-98.6 °F (37 °C)] 98.6 °F (37 °C)  Heart Rate:  [] 87  Resp:  [16-20] 20  BP: (140-197)/() 162/94     LABS:  WBC   Date Value Ref Range Status   12/07/2021 4.50 3.40 - 10.80 10*3/mm3 Final     RBC   Date Value Ref Range Status   12/07/2021 4.77 4.14 - 5.80 10*6/mm3 Final     Hemoglobin   Date Value Ref Range Status   12/07/2021 14.9 13.0 - 17.7 g/dL Final     Hematocrit   Date Value Ref Range Status   12/07/2021 42.2 37.5 - 51.0 % Final     MCV   Date Value Ref Range Status   12/07/2021 88.5  79.0 - 97.0 fL Final     MCH   Date Value Ref Range Status   12/07/2021 31.2 26.6 - 33.0 pg Final     MCHC   Date Value Ref Range Status   12/07/2021 35.3 31.5 - 35.7 g/dL Final     RDW   Date Value Ref Range Status   12/07/2021 14.0 12.3 - 15.4 % Final     RDW-SD   Date Value Ref Range Status   12/07/2021 43.8 37.0 - 54.0 fl Final     MPV   Date Value Ref Range Status   12/07/2021 7.2 6.0 - 12.0 fL Final     Platelets   Date Value Ref Range Status   12/07/2021 182 140 - 450 10*3/mm3 Final     Neutrophil %   Date Value Ref Range Status   12/07/2021 55.9 42.7 - 76.0 % Final     Lymphocyte %   Date Value Ref Range Status   12/07/2021 26.6 19.6 - 45.3 % Final     Monocyte %   Date Value Ref Range Status   12/07/2021 16.4 (H) 5.0 - 12.0 % Final     Eosinophil %   Date Value Ref Range Status   12/07/2021 0.3 0.3 - 6.2 % Final     Basophil %   Date Value Ref Range Status   12/07/2021 0.8 0.0 - 1.5 % Final     Neutrophils, Absolute   Date Value Ref Range Status   12/07/2021 2.50 1.70 - 7.00 10*3/mm3 Final     Lymphocytes, Absolute   Date Value Ref Range Status   12/07/2021 1.20 0.70 - 3.10 10*3/mm3 Final     Monocytes, Absolute   Date Value Ref Range Status   12/07/2021 0.70 0.10 - 0.90 10*3/mm3 Final     Eosinophils, Absolute   Date Value Ref Range Status   12/07/2021 0.00 0.00 - 0.40 10*3/mm3 Final     Basophils, Absolute   Date Value Ref Range Status   12/07/2021 0.00 0.00 - 0.20 10*3/mm3 Final     nRBC   Date Value Ref Range Status   12/07/2021 0.2 0.0 - 0.2 /100 WBC Final     Glucose   Date Value Ref Range Status   12/07/2021 99 65 - 99 mg/dL Final     BUN   Date Value Ref Range Status   12/07/2021 14 8 - 23 mg/dL Final     Creatinine   Date Value Ref Range Status   12/07/2021 0.75 (L) 0.76 - 1.27 mg/dL Final     Sodium   Date Value Ref Range Status   12/07/2021 137 136 - 145 mmol/L Final     Potassium   Date Value Ref Range Status   12/07/2021 3.8 3.5 - 5.2 mmol/L Final     Chloride   Date Value Ref Range Status    12/07/2021 103 98 - 107 mmol/L Final     CO2   Date Value Ref Range Status   12/07/2021 23.0 22.0 - 29.0 mmol/L Final     Calcium   Date Value Ref Range Status   12/07/2021 8.7 8.6 - 10.5 mg/dL Final     Total Protein   Date Value Ref Range Status   12/06/2021 8.2 6.0 - 8.5 g/dL Final     Albumin   Date Value Ref Range Status   12/06/2021 4.50 3.50 - 5.20 g/dL Final     ALT (SGPT)   Date Value Ref Range Status   12/06/2021 29 1 - 41 U/L Final     AST (SGOT)   Date Value Ref Range Status   12/06/2021 46 (H) 1 - 40 U/L Final     Alkaline Phosphatase   Date Value Ref Range Status   12/06/2021 70 39 - 117 U/L Final     Total Bilirubin   Date Value Ref Range Status   12/06/2021 0.4 0.0 - 1.2 mg/dL Final     eGFR Non  Amer   Date Value Ref Range Status   12/07/2021 104 >60 mL/min/1.73 Final     BUN/Creatinine Ratio   Date Value Ref Range Status   12/07/2021 18.7 7.0 - 25.0 Final     Anion Gap   Date Value Ref Range Status   12/07/2021 11.0 5.0 - 15.0 mmol/L Final       Lab Results   Component Value Date    TSH 3.750 12/06/2021     (H) 09/13/2021    DEAVCAVB90 1,324 (H) 12/06/2021         IMAGING STUDIES:  CT Head Without Contrast    Result Date: 12/6/2021  Normal study.  Electronically Signed By-Louie Mccain MD On:12/6/2021 3:10 PM This report was finalized on [id number] by  Louie Mccain MD.    MRI Cervical Spine With & Without Contrast    Result Date: 12/6/2021  IMPRESSION : 1.Multilevel degenerative changes are present.  Electronically Signed By-Andres Moon MD On:12/6/2021 6:49 PM This report was finalized on [id number] by  Andres Moon MD.      I reviewed the patient's new clinical results.      ________________________________________________________     PROBLEM LIST:    Bilateral leg weakness    Benign prostatic hyperplasia    Mixed hyperlipidemia    Essential hypertension    Bilateral arm weakness    Paresthesia of foot, bilateral    Paresthesia of hand, bilateral    Ataxia    Multiple  falls          Assessment/Plan   ASSESSMENT/PLAN:  1. Acute proximal weakness of the upper and lower extremities bilaterally. No distal/respiratory muscle weakness, no bulbar involvement, no GI/ issues.  Acute onset over 3 days with very slight improvement. Suspect myopathy associated with COVID-19 infection.   This is an atypical presentation and does not classically fit presentation for typical disorders associated with proximal weakness such as myopathy, muscular dystrophies, and MG. Clinical exam and history not consistent with GBS or TM. Myositis is possible, though CK only mildly elevated and no recent steroid use.   Pt has had numbness in the feet over the past 2 months which has remained unchanged and it is unclear if this is related or coincidence as it appears to be more of a peripheral neuropathy pattern.   - CT head: Normal study  - MRI brain: pending  - MRI cervical spine: Multilevel degenerative changes. Images reviewed: impression on the thecal sac at C5-C6 without significant cord compression or signal change.   - Labs: B12: 1324, TSH: 3.75, ESR: 21, CRP: 0.47, CK: 515, aldolase: P. UA to look for myoglobinuria, UDS, heavy metal screen    - Discontinue statin   - PT/OT as appropriate, DVT prophylaxis, fall precautions  - Needs outpt EMG and NCS  - Will continue to give him time as he has improved some. Further recommendations pending MRI brain, labs, and clinical course.     2. Elevated D-dimer  - No SOA  - Per primary       I discussed the patient's findings and my recommendations with patient, nursing staff and consulting provider    ASHLYN Rasheed  12/07/21  11:23 EST

## 2021-12-08 ENCOUNTER — READMISSION MANAGEMENT (OUTPATIENT)
Dept: CALL CENTER | Facility: HOSPITAL | Age: 71
End: 2021-12-08

## 2021-12-08 VITALS
DIASTOLIC BLOOD PRESSURE: 80 MMHG | OXYGEN SATURATION: 99 % | HEART RATE: 86 BPM | WEIGHT: 161.16 LBS | HEIGHT: 72 IN | RESPIRATION RATE: 18 BRPM | TEMPERATURE: 98.1 F | BODY MASS INDEX: 21.83 KG/M2 | SYSTOLIC BLOOD PRESSURE: 147 MMHG

## 2021-12-08 PROBLEM — D89.831 CYTOKINE RELEASE SYNDROME, GRADE 1: Status: ACTIVE | Noted: 2021-12-08

## 2021-12-08 LAB
URINE MYOGLOBIN, QUALITATIVE: POSITIVE
WHOLE BLOOD HOLD SPECIMEN: NORMAL

## 2021-12-08 PROCEDURE — 99232 SBSQ HOSP IP/OBS MODERATE 35: CPT | Performed by: NURSE PRACTITIONER

## 2021-12-08 PROCEDURE — 99238 HOSP IP/OBS DSCHRG MGMT 30/<: CPT | Performed by: HOSPITALIST

## 2021-12-08 PROCEDURE — 82085 ASSAY OF ALDOLASE: CPT | Performed by: NURSE PRACTITIONER

## 2021-12-08 PROCEDURE — 97530 THERAPEUTIC ACTIVITIES: CPT

## 2021-12-08 PROCEDURE — 97116 GAIT TRAINING THERAPY: CPT

## 2021-12-08 RX ADMIN — ATENOLOL 25 MG: 25 TABLET ORAL at 08:35

## 2021-12-08 RX ADMIN — SODIUM CHLORIDE, PRESERVATIVE FREE 10 ML: 5 INJECTION INTRAVENOUS at 08:35

## 2021-12-08 NOTE — PROGRESS NOTES
LOS: 1 day     Chief Complaint: Weakness        SUBJECTIVE:  History taken from: patient chart RN    Interval History: Pt feels much better than yesterday. He feels that his weakness has improved and he almost feels back to his normal baseline.     Review of Systems   Eyes: Negative for visual disturbance.   Cardiovascular: Negative.    Neurological: Positive for weakness and numbness. Negative for dizziness, tremors, seizures, syncope, facial asymmetry, speech difficulty, light-headedness and headaches.   Psychiatric/Behavioral: Negative for confusion.          Pertinent PMH:  has a past medical history of Hyperlipidemia, Hypertension, and IBS (irritable bowel syndrome).   ________________________________________________     OBJECTIVE:    On exam:  GENERAL: NAD  CARDIO: RRR  NEURO:  Oriented x3  EOMI, PERRL, no visual field deficits  CN 2-12 intact, No facial asymmetry  Speech clear without dysarthria  Strength 5/5 in all distal extremities  Proximal upper extremities 4/5 and improving   Proximal lower extremities 5-/5   Distal sensations still impaired but improving  0/4 DTR  No ataxia    ________________________________________________   RESULTS REVIEW    VITAL SIGNS:  Temp:  [98 °F (36.7 °C)-99.2 °F (37.3 °C)] 99.2 °F (37.3 °C)  Heart Rate:  [74-85] 83  Resp:  [16-20] 16  BP: (133-155)/(66-95) 153/85    LABS:   Lab Results   Component Value Date    WBC 4.50 12/07/2021    HGB 14.9 12/07/2021    HCT 42.2 12/07/2021    MCV 88.5 12/07/2021     12/07/2021     Lab Results   Component Value Date    GLUCOSE 99 12/07/2021    BUN 14 12/07/2021    CREATININE 0.75 (L) 12/07/2021    EGFRIFNONA 104 12/07/2021    EGFRIFAFRI 95 09/13/2021    BCR 18.7 12/07/2021    K 3.8 12/07/2021    CO2 23.0 12/07/2021    CALCIUM 8.7 12/07/2021    PROTENTOTREF 7.3 09/13/2021    ALBUMIN 4.50 12/06/2021    LABIL2 1.7 09/13/2021    AST 46 (H) 12/06/2021    ALT 29 12/06/2021       Lab Results   Component Value Date    TSH 3.750  12/06/2021     (H) 09/13/2021    SERPRYSH04 1,324 (H) 12/06/2021         IMAGING STUDIES:  CT Head Without Contrast    Result Date: 12/6/2021  Normal study.  Electronically Signed By-Louie Mccain MD On:12/6/2021 3:10 PM This report was finalized on [id number] by  Louie Mccain MD.    MRI Brain Without Contrast    Result Date: 12/7/2021  1.Minimal periventricular white matter changes which while nonspecific could reflect more chronic small vessel ischemic change. 2.Some prominence to the lateral ventricles which may be reflective of some underlying volume loss. 3.Suggested fissure within the clivus that could be developmental.  Electronically Signed By-Andres Moon MD On:12/7/2021 5:31 PM This report was finalized on [id number] by  Andres Moon MD.    MRI Cervical Spine With & Without Contrast    Result Date: 12/6/2021  IMPRESSION : 1.Multilevel degenerative changes are present.  Electronically Signed By-Andres Moon MD On:12/6/2021 6:49 PM This report was finalized on [id number] by  Andres Moon MD.      I reviewed the patient's new clinical results.    ________________________________________________      PROBLEM LIST:    Bilateral leg weakness    Benign prostatic hyperplasia    Mixed hyperlipidemia    Essential hypertension    Bilateral arm weakness    Paresthesia of foot, bilateral    Paresthesia of hand, bilateral    Ataxia    Multiple falls    COVID-19 virus infection    Weakness        Assessment/Plan   ASSESSMENT/PLAN:  1. Acute proximal weakness of the upper and lower extremities bilaterally. No distal/respiratory muscle weakness, no bulbar involvement, no GI/ issues. Significant improvement over night, suspect myopathy associated with COVID-19 infection.   - CT head: Normal study  - MRI brain: Reviewed and just showed minimal periventricular white matter changes some prominence to the lateral ventricles which could reflect underlying volume loss.  - MRI cervical spine: Multilevel  degenerative changes. Images reviewed: impression on the thecal sac at C5-C6 without significant cord compression or signal change.   - Labs: B12: 1324, TSH: 3.75, ESR: 21, CRP: 0.47, CK: 515, aldolase: P. Myoglobin urine +, UDS -, heavy metal screen: P (f/u outpatient)   - Discontinue statin   - PT/OT as appropriate, DVT prophylaxis, fall precautions  - Needs outpt EMG and NCS  - Please have patient follow up with Dr. Demond Serna at [facility]       2. Elevated D-dimer  - No SOA  - Per primary         **Please refer to previous notes for further details and recommendations.     I discussed the patient's findings and my recommendations with patient and nursing staff.    ASHLYN Espinosa  12/08/21  10:06 EST

## 2021-12-08 NOTE — NURSING NOTE
Pt stated he was made to walk 20 feet without assistance when he went for MRI earlier today. Pt stated he feels he would be safer at home with his wife's assistance than here at the hospital. This RN educated pt on PT/OT, and pt stated he would be willing to go to PT/rehab after quarantine for covid is over.     Pt alert and oriented x 4.

## 2021-12-08 NOTE — CASE MANAGEMENT/SOCIAL WORK
Continued Stay Note  LENNIE Butterfield     Patient Name: Emanuel Alcantara  MRN: [id number]  Today's Date: 12/8/2021    Admit Date: 12/6/2021     Discharge Plan     Row Name 12/08/21 0934       Plan    Plan D/C Plan : Home with family , declining H/H , plans to do outpt PT once out of COVID-19 precautions    Patient/Family in Agreement with Plan yes    Plan Comments Pt to call PCP to set up outpt PT once out of COVID-19 precautions , Pt lives in [facility] at states he will get it set up               Discharge Codes    No documentation.               Expected Discharge Date and Time     Expected Discharge Date Expected Discharge Time    Dec 8, 2021             Sissy Randall RN

## 2021-12-08 NOTE — PLAN OF CARE
Problem: Adult Inpatient Plan of Care  Goal: Plan of Care Review  Outcome: Ongoing, Progressing  Goal: Patient-Specific Goal (Individualized)  Outcome: Ongoing, Progressing  Goal: Absence of Hospital-Acquired Illness or Injury  Outcome: Ongoing, Progressing  Intervention: Identify and Manage Fall Risk  Recent Flowsheet Documentation  Taken 12/8/2021 0417 by Chelsey Staley RN  Safety Promotion/Fall Prevention:   activity supervised   assistive device/personal items within reach   clutter free environment maintained   fall prevention program maintained   nonskid shoes/slippers when out of bed   room organization consistent   safety round/check completed  Taken 12/8/2021 0200 by Chelsey Staley RN  Safety Promotion/Fall Prevention:   activity supervised   assistive device/personal items within reach   clutter free environment maintained   fall prevention program maintained   nonskid shoes/slippers when out of bed   room organization consistent   safety round/check completed  Taken 12/8/2021 0000 by Chelsey Staley RN  Safety Promotion/Fall Prevention:   activity supervised   assistive device/personal items within reach   clutter free environment maintained   fall prevention program maintained   nonskid shoes/slippers when out of bed   room organization consistent   safety round/check completed  Taken 12/7/2021 2355 by Chelsey Staley RN  Safety Promotion/Fall Prevention:   activity supervised   assistive device/personal items within reach   clutter free environment maintained   fall prevention program maintained   nonskid shoes/slippers when out of bed   room organization consistent   safety round/check completed  Taken 12/7/2021 2200 by Chelsey Staley RN  Safety Promotion/Fall Prevention:   activity supervised   assistive device/personal items within reach   clutter free environment maintained   fall prevention program maintained   nonskid shoes/slippers when out of bed   room organization consistent   safety  round/check completed  Taken 12/7/2021 2030 by Chelsey Staley RN  Safety Promotion/Fall Prevention:   activity supervised   assistive device/personal items within reach   clutter free environment maintained   fall prevention program maintained   nonskid shoes/slippers when out of bed   room organization consistent   safety round/check completed  Taken 12/7/2021 2000 by Chelsey Staley RN  Safety Promotion/Fall Prevention:   activity supervised   assistive device/personal items within reach   clutter free environment maintained   fall prevention program maintained   nonskid shoes/slippers when out of bed   room organization consistent   safety round/check completed  Intervention: Prevent Skin Injury  Recent Flowsheet Documentation  Taken 12/8/2021 0417 by Chelsey Staley RN  Body Position: position changed independently  Skin Protection:   adhesive use limited   skin-to-device areas padded   skin-to-skin areas padded   transparent dressing maintained   tubing/devices free from skin contact  Taken 12/7/2021 2355 by Chelesy Staley RN  Body Position: position changed independently  Skin Protection:   adhesive use limited   skin-to-device areas padded   skin-to-skin areas padded   transparent dressing maintained   tubing/devices free from skin contact  Taken 12/7/2021 2030 by Chelsey Staley RN  Body Position: position changed independently  Skin Protection:   adhesive use limited   skin-to-device areas padded   skin-to-skin areas padded   transparent dressing maintained   tubing/devices free from skin contact  Intervention: Prevent and Manage VTE (venous thromboembolism) Risk  Recent Flowsheet Documentation  Taken 12/8/2021 0417 by Chelsey Staley RN  VTE Prevention/Management: bleeding risk factor(s) identified  Taken 12/7/2021 2030 by Chelsey Staley RN  VTE Prevention/Management: bleeding risk factor(s) identified  Intervention: Prevent Infection  Recent Flowsheet Documentation  Taken 12/8/2021 0417 by  Chelsey Staley RN  Infection Prevention:   environmental surveillance performed   hand hygiene promoted   personal protective equipment utilized   rest/sleep promoted   single patient room provided   visitors restricted/screened  Taken 12/7/2021 2355 by Chelsey Staley RN  Infection Prevention:   environmental surveillance performed   hand hygiene promoted   personal protective equipment utilized   rest/sleep promoted   single patient room provided  Taken 12/7/2021 2030 by Chelsey Staley RN  Infection Prevention:   environmental surveillance performed   hand hygiene promoted   personal protective equipment utilized   rest/sleep promoted   single patient room provided   visitors restricted/screened  Goal: Optimal Comfort and Wellbeing  Outcome: Ongoing, Progressing  Intervention: Provide Person-Centered Care  Recent Flowsheet Documentation  Taken 12/8/2021 0417 by Chelsey Staley RN  Trust Relationship/Rapport:   care explained   choices provided   emotional support provided   empathic listening provided   questions answered   questions encouraged   reassurance provided   thoughts/feelings acknowledged  Taken 12/7/2021 2355 by Chelsey Staley RN  Trust Relationship/Rapport:   care explained   choices provided   emotional support provided   empathic listening provided   questions answered   questions encouraged   reassurance provided   thoughts/feelings acknowledged  Taken 12/7/2021 2030 by Chelsey Staley RN  Trust Relationship/Rapport:   care explained   choices provided   emotional support provided   empathic listening provided   questions answered   questions encouraged   reassurance provided   thoughts/feelings acknowledged  Goal: Readiness for Transition of Care  Outcome: Ongoing, Progressing     Problem: Fall Injury Risk  Goal: Absence of Fall and Fall-Related Injury  Outcome: Ongoing, Progressing  Intervention: Identify and Manage Contributors to Fall Injury Risk  Recent Flowsheet Documentation  Taken  12/8/2021 0417 by Chelsey Staley RN  Medication Review/Management: medications reviewed  Self-Care Promotion:   independence encouraged   BADL personal objects within reach  Taken 12/7/2021 2355 by Chelsey Staley RN  Medication Review/Management: medications reviewed  Self-Care Promotion:   independence encouraged   BADL personal objects within reach  Taken 12/7/2021 2030 by Chelsey Staley RN  Medication Review/Management: medications reviewed  Self-Care Promotion:   independence encouraged   BADL personal objects within reach  Intervention: Promote Injury-Free Environment  Recent Flowsheet Documentation  Taken 12/8/2021 0417 by Chelsey Staley RN  Safety Promotion/Fall Prevention:   activity supervised   assistive device/personal items within reach   clutter free environment maintained   fall prevention program maintained   nonskid shoes/slippers when out of bed   room organization consistent   safety round/check completed  Taken 12/8/2021 0200 by Chelsey Staley RN  Safety Promotion/Fall Prevention:   activity supervised   assistive device/personal items within reach   clutter free environment maintained   fall prevention program maintained   nonskid shoes/slippers when out of bed   room organization consistent   safety round/check completed  Taken 12/8/2021 0000 by Chelsey Staley RN  Safety Promotion/Fall Prevention:   activity supervised   assistive device/personal items within reach   clutter free environment maintained   fall prevention program maintained   nonskid shoes/slippers when out of bed   room organization consistent   safety round/check completed  Taken 12/7/2021 2355 by Chelsey Staley RN  Safety Promotion/Fall Prevention:   activity supervised   assistive device/personal items within reach   clutter free environment maintained   fall prevention program maintained   nonskid shoes/slippers when out of bed   room organization consistent   safety round/check completed  Taken 12/7/2021 2200  by Zollman, Sulena, RN  Safety Promotion/Fall Prevention:   activity supervised   assistive device/personal items within reach   clutter free environment maintained   fall prevention program maintained   nonskid shoes/slippers when out of bed   room organization consistent   safety round/check completed  Taken 12/7/2021 2030 by Chelsey Staley RN  Safety Promotion/Fall Prevention:   activity supervised   assistive device/personal items within reach   clutter free environment maintained   fall prevention program maintained   nonskid shoes/slippers when out of bed   room organization consistent   safety round/check completed  Taken 12/7/2021 2000 by Chelsey Staley RN  Safety Promotion/Fall Prevention:   activity supervised   assistive device/personal items within reach   clutter free environment maintained   fall prevention program maintained   nonskid shoes/slippers when out of bed   room organization consistent   safety round/check completed     Problem: Skin Injury Risk Increased  Goal: Skin Health and Integrity  Outcome: Ongoing, Progressing  Intervention: Optimize Skin Protection  Recent Flowsheet Documentation  Taken 12/8/2021 0417 by Chelsey Staley RN  Pressure Reduction Techniques:   frequent weight shift encouraged   pressure points protected  Head of Bed (HOB): [facility] elevated  Pressure Reduction Devices:   pressure-redistributing mattress utilized   positioning supports utilized  Skin Protection:   adhesive use limited   skin-to-device areas padded   skin-to-skin areas padded   transparent dressing maintained   tubing/devices free from skin contact  Taken 12/7/2021 2355 by Chelsey Staley RN  Pressure Reduction Techniques:   frequent weight shift encouraged   pressure points protected  Head of Bed (HOB): HOB elevated  Pressure Reduction Devices:   pressure-redistributing mattress utilized   positioning supports utilized  Skin Protection:   adhesive use limited   skin-to-device areas padded   skin-to-skin  areas padded   transparent dressing maintained   tubing/devices free from skin contact  Taken 12/7/2021 2030 by Chelsey Staley RN  Pressure Reduction Techniques:   frequent weight shift encouraged   pressure points protected  Head of Bed (HOB): HOB elevated  Pressure Reduction Devices: pressure-redistributing mattress utilized  Skin Protection:   adhesive use limited   skin-to-device areas padded   skin-to-skin areas padded   transparent dressing maintained   tubing/devices free from skin contact     Problem: Balance Impairment (Functional Deficit)  Goal: Improved Balance and Postural Control  Outcome: Ongoing, Progressing  Intervention: Optimize Balance and Safe Activity  Recent Flowsheet Documentation  Taken 12/8/2021 0417 by Chelsey Staley RN  Safety Promotion/Fall Prevention:   activity supervised   assistive device/personal items within reach   clutter free environment maintained   fall prevention program maintained   nonskid shoes/slippers when out of bed   room organization consistent   safety round/check completed  Taken 12/8/2021 0200 by Chelsey Staley RN  Safety Promotion/Fall Prevention:   activity supervised   assistive device/personal items within reach   clutter free environment maintained   fall prevention program maintained   nonskid shoes/slippers when out of bed   room organization consistent   safety round/check completed  Taken 12/8/2021 0000 by Chelsey Staley RN  Safety Promotion/Fall Prevention:   activity supervised   assistive device/personal items within reach   clutter free environment maintained   fall prevention program maintained   nonskid shoes/slippers when out of bed   room organization consistent   safety round/check completed  Taken 12/7/2021 2355 by Chelsey Staley RN  Safety Promotion/Fall Prevention:   activity supervised   assistive device/personal items within reach   clutter free environment maintained   fall prevention program maintained   nonskid shoes/slippers when  out of bed   room organization consistent   safety round/check completed  Taken 12/7/2021 2200 by Chelsey Staley RN  Safety Promotion/Fall Prevention:   activity supervised   assistive device/personal items within reach   clutter free environment maintained   fall prevention program maintained   nonskid shoes/slippers when out of bed   room organization consistent   safety round/check completed  Taken 12/7/2021 2030 by Chelsey Staley RN  Safety Promotion/Fall Prevention:   activity supervised   assistive device/personal items within reach   clutter free environment maintained   fall prevention program maintained   nonskid shoes/slippers when out of bed   room organization consistent   safety round/check completed  Taken 12/7/2021 2000 by Chelsey Staley RN  Safety Promotion/Fall Prevention:   activity supervised   assistive device/personal items within reach   clutter free environment maintained   fall prevention program maintained   nonskid shoes/slippers when out of bed   room organization consistent   safety round/check completed     Problem: Cognitive Impairment (Functional Deficit)  Goal: Optimal Functional [place]  Outcome: Ongoing, Progressing  Intervention: Optimize Cognitive Function  Recent Flowsheet Documentation  Taken 12/8/2021 0417 by Chelsey Staley RN  Sensory Stimulation Regulation:   care clustered   lighting decreased   quiet environment promoted  Reorientation Measures:   clock in view   reorientation provided  Self-Care Promotion:   independence encouraged   BADL personal objects within reach  Taken 12/7/2021 2355 by Chelsey Staley RN  Sensory Stimulation Regulation:   care clustered   lighting decreased   quiet environment promoted   television on  Reorientation Measures:   clock in view   reorientation provided  Self-Care Promotion:   independence encouraged   BADL personal objects within reach  Taken 12/7/2021 2030 by Chelsey Staley RN  Sensory Stimulation Regulation:   care  clustered   lighting decreased   quiet environment promoted   television on  Reorientation Measures:   clock in view   reorientation provided  Self-Care Promotion:   independence encouraged   BADL personal objects within reach     Problem: Coordination Impairment (Functional Deficit)  Goal: Optimal Coordination  Outcome: Ongoing, Progressing  Intervention: Optimize Motor Coordination and Functional Ability  Recent Flowsheet Documentation  Taken 12/8/2021 0417 by Chelsey Staley RN  Self-Care Promotion:   independence encouraged   BADL personal objects within reach  Taken 12/7/2021 2355 by Chelsey Staley RN  Self-Care Promotion:   independence encouraged   BADL personal objects within reach  Taken 12/7/2021 2030 by Chelsey Staley RN  Self-Care Promotion:   independence encouraged   BADL personal objects within reach     Problem: Muscle Strength Impairment (Functional Deficit)  Goal: Improved Muscle Strength  Outcome: Ongoing, Progressing  Intervention: Optimize Muscle Strength  Recent Flowsheet Documentation  Taken 12/8/2021 0417 by Chelsey Staley RN  Self-Care Promotion:   independence encouraged   BADL personal objects within reach  Taken 12/7/2021 2355 by Chelsey Staley RN  Self-Care Promotion:   independence encouraged   BADL personal objects within reach  Taken 12/7/2021 2030 by Chelsey Staley RN  Self-Care Promotion:   independence encouraged   BADL personal objects within reach     Problem: Muscle Tone Impairment (Functional Deficit)  Goal: Improved Muscle Tone  Outcome: Ongoing, Progressing     Problem: Range of Motion Impairment (Functional Deficit)  Goal: Optimal Range of Motion  Outcome: Ongoing, Progressing  Intervention: Maintain Functional Joint Range and Position  Recent Flowsheet Documentation  Taken 12/8/2021 0417 by Chelsey Staley RN  Positioning/Transfer Devices:   pillows   in use  Taken 12/7/2021 2355 by Chelsey Staley RN  Positioning/Transfer Devices:   pillows   in use  Range of  Motion: active ROM (range of motion) encouraged  Taken 12/7/2021 2030 by Chelsey Staley RN  Positioning/Transfer Devices:   pillows   in use  Range of Motion: active ROM (range of motion) encouraged     Problem: Sensory Impairment (Functional Deficit)  Goal: Compensation for Sensory Deficit  Outcome: Ongoing, Progressing  Intervention: Prevent Injury Related to Sensory Impairment  Recent Flowsheet Documentation  Taken 12/8/2021 0417 by Chelsey Staley RN  Pressure Reduction Devices:   pressure-redistributing mattress utilized   positioning supports utilized  Skin Protection:   adhesive use limited   skin-to-device areas padded   skin-to-skin areas padded   transparent dressing maintained   tubing/devices free from skin contact  Taken 12/7/2021 2355 by Chelsey Staley RN  Pressure Reduction Devices:   pressure-redistributing mattress utilized   positioning supports utilized  Skin Protection:   adhesive use limited   skin-to-device areas padded   skin-to-skin areas padded   transparent dressing maintained   tubing/devices free from skin contact  Taken 12/7/2021 2030 by Chelsey Staley RN  Pressure Reduction Devices: pressure-redistributing mattress utilized  Skin Protection:   adhesive use limited   skin-to-device areas padded   skin-to-skin areas padded   transparent dressing maintained   tubing/devices free from skin contact   Goal Outcome Evaluation:

## 2021-12-08 NOTE — DISCHARGE PLACEMENT REQUEST
"Emanuel Harp (70 y.o. Male)             Date of Birth Social Security Number Address Home Phone MRN    1950  [address]  [place] [facility] [phone] [id number]    Anabaptist Marital Status             None        Admission Date Admission Type Admitting Provider Attending Provider Department, Room/Bed    12/6/21 Emergency Ari Lebron, Ari Frank,  [facility] CARE, 2107/1    Discharge Date Discharge Disposition Discharge Destination           Home or Self Care              Attending Provider: Ari Lebron DO    Allergies: No Known Allergies    Isolation: Enh Drop/Con   Infection: COVID (confirmed) (12/06/21)   Code Status: CPR   Advance Care Planning Activity    Ht: 182.9 cm (72\")   Wt: 73.1 kg (161 lb 2.5 oz)    Admission Cmt: None   Principal Problem: Bilateral leg weakness [R29.898]                 Active Insurance as of 12/6/2021     Primary Coverage     Payor Plan Insurance Group Employer/Plan Group    MEDICARE MEDICARE A & B      Payor Plan Address Payor Plan Phone Number Payor Plan Fax Number Effective Dates    [address] [phone]  12/1/2017 - None Entered    [facility] [id number]       Subscriber Name Subscriber Birth Date Member ID       HARPEMANUEL 1950 [id number]           Secondary Coverage     Payor Plan Insurance Group Employer/Plan Group    [employer]           [id number]     Payor Plan Address Payor Plan Phone Number Payor Plan Fax Number Effective Dates    [address] [address]   12/1/2017 - None Entered    [facility] [id number]       Subscriber Name Subscriber Birth Date Member ID       HARP,EMANUEL GOMEZ 1950 [id number]                 Emergency Contacts      (Rel.) Home Phone Work Phone Mobile Phone    GELY ROMEO (Other) -- -- [phone]            "

## 2021-12-08 NOTE — DISCHARGE SUMMARY
[facility] Medicine Services  DISCHARGE SUMMARY    Patient Name: Emanuel Alcantara  : 1950  MRN: [id number]    Date of Admission: 2021  Date of Discharge:  2021  Primary Care Physician: Monty Henry MD      Presenting Problem:   Weakness [R53.1]  Bilateral leg weakness [R29.898]    Active and Resolved Hospital Problems:  Active Hospital Problems    Diagnosis POA   • **Bilateral leg weakness [R29.898] Yes     Priority: High   • COVID-19 virus infection [U07.1] Yes     Priority: High   • Bilateral arm weakness [R29.898] Yes     Priority: High   • Paresthesia of hand, bilateral [R20.2] Yes     Priority: High   • Ataxia [R27.0] Yes     Priority: High   • Multiple falls [R29.6] Not Applicable     Priority: High   • Paresthesia of foot, bilateral [R20.2] Yes     Priority: Medium   • Essential hypertension [I10] Yes     Priority: Medium   • Mixed hyperlipidemia [E78.2] Yes     Priority: Medium   • Benign prostatic hyperplasia [N40.0] Yes     Priority: Medium   • Cytokine release syndrome, grade 1 [D89.831] No   • Weakness [R53.1] Yes      Resolved Hospital Problems   No resolved problems to display.         Hospital Course     Hospital Course:    The patient is a 62-year-old unvaccinated male history of hypertension and hyperlipidemia that  presented to the emergency room on 2021 complaining of generalized weakness in association with proximal hip weakness causing inability to ambulate.  Apparently the patient has had falls at home.  In addition, the patient has had several month of numbness in his feet and hands.      In the ED, CT of the head ruled out acute intracranial pathology.  MRI of the cervical spine ruled out cord compression.  COVID-19 test was positive.    The patient was admitted to the medical floor for treatment of generalized weakness due to COVID-19 infection.  Neurology evaluated the patient and deemed weakness likely from COVID-19 infection.  The  patient did not want to go to inpatient rehab and wanted to go home thus was discharged home on 12/8/2021.  The patient has been hemodynamically stable during his hospital stay and did not require supplemental oxygen.  Neurology recommended follow-up with neuromuscular team at [facility] in the future if he continues to have proximal muscle weakness.            DISCHARGE Follow Up Recommendations for labs and diagnostics:   --Patient may benefit from referral to rehab.      Reasons For Change In Medications and Indications for New Medications:      Day of Discharge     Vital Signs:  Temp:  [98.1 °F (36.7 °C)-99.2 °F (37.3 °C)] 98.1 °F (36.7 °C)  Heart Rate:  [79-86] 86  Resp:  [16-18] 18  BP: (139-155)/(76-95) 147/80    Physical Exam:  Physical Exam  HENT:      Head: Normocephalic.      Mouth/Throat:      Mouth: Mucous membranes are moist.   Eyes:      Extraocular Movements: Extraocular movements intact.      Pupils: Pupils are equal, round, and reactive to light.   Cardiovascular:      Rate and Rhythm: Normal rate and regular rhythm.   Pulmonary:      Effort: Pulmonary effort is normal.   Abdominal:      General: Bowel sounds are normal.   Musculoskeletal:         General: Normal range of motion.      Cervical back: Normal range of motion.   Skin:     General: Skin is warm.   Neurological:      Mental Status: He is alert. Mental status is at baseline.   Psychiatric:         Mood and Affect: Mood normal.            Pertinent  and/or Most Recent Results     LAB RESULTS:      Lab 12/07/21  0603 12/06/21  1404   WBC 4.50 8.00   HEMOGLOBIN 14.9 15.7   HEMATOCRIT 42.2 45.5   PLATELETS 182 179   NEUTROS ABS 2.50 6.50   LYMPHS ABS 1.20 0.70   MONOS ABS 0.70 0.70   EOS ABS 0.00 0.00   MCV 88.5 90.5   SED RATE  --  21*   CRP  --  0.47         Lab 12/07/21  0603 12/06/21  1845 12/06/21  1404   SODIUM 137  --  140   POTASSIUM 3.8  --  4.2   CHLORIDE 103  --  102   CO2 23.0  --  21.0*   ANION GAP 11.0  --  17.0*   BUN 14  --  12    CREATININE 0.75*  --  0.84   GLUCOSE 99  --  99   CALCIUM 8.7  --  9.2   TSH  --  3.750  --          Lab 12/06/21  1404   TOTAL PROTEIN 8.2   ALBUMIN 4.50   GLOBULIN 3.7   ALT (SGPT) 29   AST (SGOT) 46*   BILIRUBIN 0.4   ALK PHOS 70                 Lab 12/06/21  1845   VITAMIN B 12 1,324*         Brief Urine Lab Results  (Last result in the past 365 days)      Color   Clarity   Blood   Leuk Est   Nitrite   Protein   CREAT   Urine HCG        12/07/21 1512 Yellow   Clear   Small (1+)   Negative   Negative   100 mg/dL (2+)               Microbiology Results (last 10 days)     Procedure Component Value - Date/Time    COVID PRE-OP / PRE-PROCEDURE SCREENING ORDER (NO ISOLATION) - Swab, Nasopharynx [[id number]]  (Abnormal) Collected: 12/06/21 1653    Lab Status: Final result Specimen: Swab from Nasopharynx Updated: 12/06/21 1757    Narrative:      The following orders were created for panel order COVID PRE-OP / PRE-PROCEDURE SCREENING ORDER (NO ISOLATION) - Swab, Nasopharynx.  Procedure                               Abnormality         Status                     ---------                               -----------         ------                     COVID-19,CEPHEID/JUAN CARLOS/BD...[[id number]]  Abnormal            Final result                 Please view results for these tests on the individual orders.    COVID-19,CEPHEID/JUAN CARLOS/BDMAX,COR/[facility]/PAD/IZABELA IN-HOUSE(OR EMERGENT/ADD-ON),NP SWAB IN TRANSPORT MEDIA 3-4 HR TAT, RT-PCR - Swab, Nasopharynx [[id number]]  (Abnormal) Collected: 12/06/21 1653    Lab Status: Final result Specimen: Swab from Nasopharynx Updated: 12/06/21 1757     COVID19 Detected    Narrative:      Fact sheet for providers: https://www.fda.gov/media/371926/download     Fact sheet for patients: https://www.fda.gov/media/169952/download  Fact sheet for providers: https://www.fda.gov/media/821084/download    Fact sheet for patients: https://www.fda.gov/media/699206/download    Test performed by PCR.          CT Head  Without Contrast    Result Date: 12/6/2021  Impression: Normal study.  Electronically Signed By-Louie Mccain MD On:12/6/2021 3:10 PM This report was finalized on [id number] by  Louie Mccain MD.    MRI Brain Without Contrast    Result Date: 12/7/2021  Impression: 1.Minimal periventricular white matter changes which while nonspecific could reflect more chronic small vessel ischemic change. 2.Some prominence to the lateral ventricles which may be reflective of some underlying volume loss. 3.Suggested fissure within the clivus that could be developmental.  Electronically Signed By-Andres Moon MD On:12/7/2021 5:31 PM This report was finalized on [id number] by  Andres Moon MD.    MRI Cervical Spine With & Without Contrast    Result Date: 12/6/2021  Impression: IMPRESSION : 1.Multilevel degenerative changes are present.  Electronically Signed By-Andres Moon MD On:12/6/2021 6:49 PM This report was finalized on [id number] by  Andres Moon MD.                  Labs Pending at Discharge:  Pending Labs     Order Current Status    Aldolase In process    Heavy Metals Screen, Urine - Urine, Clean Catch In process          Procedures Performed           Consults:   Consults     Date and Time Order Name Status Description    12/6/2021  4:04 PM Inpatient Neurology Consult General Completed     12/6/2021  3:26 PM Hospitalist (on-call MD unless specified)              Discharge Details        Discharge Medications      Continue These Medications      Instructions Start Date   atenolol 25 MG tablet  Commonly known as: TENORMIN   25 mg, Oral, Daily      rosuvastatin 5 MG tablet  Commonly known as: CRESTOR  Notes to patient: Every other day.   5 mg, Oral, Every Other Day             No Known Allergies      Discharge Disposition:   Home or Self Care    Diet:  Hospital:  Diet Order   Procedures   • Diet Regular         Discharge Activity:   Activity Instructions    Ambulate as tolerated.   Walker provided from [facility].           Discharge Condition: stable      CODE STATUS:  Code Status and Medical Interventions:   Ordered at: 12/06/21 0531     Level Of Support Discussed With:    Patient     Code Status (Patient has no pulse and is not breathing):    CPR (Attempt to Resuscitate)     Medical Interventions (Patient has pulse or is breathing):    Full Support         Future Appointments   Date Time Provider Department Center   9/14/2022  8:15 AM [facility] GONZALEZ [facility]   9/23/2022 10:30 AM Monty Henry MD [facility]       Additional Instructions for the Follow-ups that You Need to Schedule     Discharge Follow-up with PCP   As directed       Currently Documented PCP:    Monty Henry MD    PCP Phone Number:    [phone]     Follow Up Details: 2 weeks         Discharge Follow-up with Specified Provider: Neuromuscular/neurology team at [facility]; 1 Month   As directed      To: Neuromuscular/neurology team at [facility]    Follow Up: 1 Month    Follow Up Details: Patient continues to have proximal hip weakness [facility]               Time spent on Discharge including face to face service:  15 minutes    This patient has been examined wearing appropriate Personal Protective Equipment and discussed with nursing. 12/08/21      Signature: Electronically signed by Ari Lebron DO, 12/08/21, 5:00 PM EST.

## 2021-12-08 NOTE — PLAN OF CARE
Goal Outcome Evaluation:  Plan of Care Reviewed With: patient        Progress: improving  Outcome Summary:  Patient anticipating discharge home. Refused home health at this time but patient states he is to follow up with his primary provider after covid isolation for an OP PT order. Walker provided to patient for discharge.   No changes in Q4 neuro checks.    Awaiting medical clearance from neurology MD at this time.     Will continue to monitor.     Problem: Adult Inpatient Plan of Care  Goal: Absence of Hospital-Acquired Illness or Injury  Intervention: Prevent Skin Injury  Recent Flowsheet Documentation  Taken 12/8/2021 0839 by Nesha Cartwright, RN  Body Position: position changed independently     Problem: Adult Inpatient Plan of Care  Goal: Absence of Hospital-Acquired Illness or Injury  Intervention: Prevent Infection  Recent Flowsheet Documentation  Taken 12/8/2021 1000 by Nesha Cartwright, RN  Infection Prevention:   single patient room provided   rest/sleep promoted  Taken 12/8/2021 0839 by Nesha Cartwright, RN  Infection Prevention:   single patient room provided   rest/sleep promoted   hand hygiene promoted   equipment surfaces disinfected

## 2021-12-08 NOTE — PLAN OF CARE
Emanuel Alcantara presents with functional mobility impairments which indicate the need for skilled intervention. Tolerating session today without incident. Pt with improved independence with functional mobility this date.  Pt continues to exhibit BLE weakness and impaired balance, however balance and safety improve with use of RW.  PT spoke with pt regarding PT recommendation for OPPT to address remaining deficits once out of covid precautions.  At this time, PTis recommending home with assist of wife, HHPT, and use of RW.  Will continue to follow and progress as tolerated.     Plan/Recommendations:   Pt would benefit from Home with family assist and and Home Health at discharge from facility and requires rolling walker at discharge. PT recommends OPPT when out of covid precautions.  Pt desires Home with family assist and and Home Health at discharge. Pt cooperative; agreeable to therapeutic recommendations and plan of care.

## 2021-12-08 NOTE — OUTREACH NOTE
Prep Survey      Responses   [facility] discharged [facility]? [facility]   Is LACE score < 7 ? Yes   Emergency Room discharge w/ pulse ox? No   Eligibility [facility]   Date of Admission 12/06/21   Date of Discharge 12/08/21   Discharge Disposition Home or Self Care   Discharge diagnosis Bilateral leg weakness,  covid   Does the patient have one of the following disease processes/diagnoses(primary or secondary)? COVID-19   Does the patient have Home health ordered? No   Is there a DME ordered? No   Prep survey completed? Yes          Kenya Foster RN

## 2021-12-08 NOTE — THERAPY TREATMENT NOTE
Subjective: Pt agreeable to therapeutic plan of care.  Pt reports he is feeling much better.    Objective:     Bed mobility - SBA using bedrail  Transfers - CGA  Ambulation - 20 ft with CGA-Woodrow, 60 ft using RW with CGA    Pt presents on room air with no signs of SOA noted.    Pain: 3 VAS generalized weakness  Education: Provided education on importance of mobility and skilled verbal / tactile cueing throughout intervention.     Assessment: Emanuel Alcantara presents with functional mobility impairments which indicate the need for skilled intervention. Tolerating session today without incident. Pt with improved independence with functional mobility this date.  Pt continues to exhibit BLE weakness and impaired balance, however balance and safety improve with use of RW.  PT spoke with pt regarding PT recommendation for OPPT to address remaining deficits once out of covid precautions.  At this time, PTis recommending home with assist of wife, HHPT, and use of RW.  Will continue to follow and progress as tolerated.     Plan/Recommendations:   Pt would benefit from Home with family assist and and Home Health at discharge from facility and requires rolling walker at discharge. PT recommends OPPT when out of covid precautions.  Pt desires Home with family assist and and Home Health at discharge. Pt cooperative; agreeable to therapeutic recommendations and plan of care.      Basic Mobility 6-click:  Rollin = Total, A lot = 2, A little = 3; 4 = None  Supine>Sit:   1 = Total, A lot = 2, A little = 3; 4 = None   Sit>Stand with arms:  1 = Total, A lot = 2, A little = 3; 4 = None  Bed>Chair:   1 = Total, A lot = 2, A little = 3; 4 = None  Ambulate in room:  1 = Total, A lot = 2, A little = 3; 4 = None  3-5 Steps with railin = Total, A lot = 2, A little = 3; 4 = None  Score: 17    Modified [place]: 4 = Moderately severe disability (Unable to attend to own bodily needs without assistance, and unable to walk  unassisted)     Post-Tx Position: Up in Chair and Staff Present  PPE: gloves, surgical mask, eyewear protection

## 2021-12-09 ENCOUNTER — TRANSITIONAL CARE MANAGEMENT TELEPHONE ENCOUNTER (OUTPATIENT)
Dept: CALL CENTER | Facility: HOSPITAL | Age: 71
End: 2021-12-09

## 2021-12-09 LAB
ALDOLASE SERPL-CCNC: 11.6 U/L (ref 3.3–10.3)
ARSENIC 24H UR-MCNC: NORMAL UG/L (ref 0–9)
ARSENIC/CREAT UR: NORMAL
CREAT UR-MCNC: 1.74 G/L (ref 0.3–3)
LEAD 24H UR-MCNC: NORMAL UG/L (ref 0–49)
MERCURY 24H UR-MCNC: NORMAL UG/L (ref 0–19)

## 2021-12-09 NOTE — OUTREACH NOTE
Call Center TCM Note      Responses   [facility] patient discharged from? [facility]   Does the patient have one of the following disease processes/diagnoses(primary or secondary)? COVID-19   COVID-19 underlying condition? None   TCM attempt successful? Yes   Discharge diagnosis Bilateral leg weakness,  covid   Meds reviewed with patient/caregiver? Yes   Does the patient have all medications ordered at discharge? N/A   Prescription comments no med changes   Is the patient taking all medications as directed (includes completed medication regime)? Yes   Does the patient have a primary care provider?  Yes   Comments regarding PCP Pt will call Dr Henry tomorroow to sched TCM [facility]   Does the patient have an appointment with their PCP or specialist within 7 days of discharge? No   Has the patient kept scheduled appointments due by today? N/A   Has home health visited the patient within 72 hours of discharge? N/A   Psychosocial issues? No   Did the patient receive a copy of their discharge instructions? Yes   Did the patient receive a copy of COVID-19 specific instructions? Yes   Nursing interventions Reviewed instructions with patient   What is the patient's perception of their health status since discharge? Improving   Does the patient have any of the following symptoms? None   Nursing Interventions Nurse provided patient education   Pulse Ox monitoring None   Is the patient/caregiver able to teach back steps to recovery at home? Set small, achievable goals for return to baseline health,  Weigh daily,  Eat a well-balance diet,  Make a list of questions for provider's appointment,  Practice good oral hygiene,  Rest and rebuild strength, gradually increase activity   If the patient is a current smoker, are they able to teach back resources for cessation? Not a smoker   Is the patient/caregiver able to teach back the hierarchy of who to call/visit for symptoms/problems? PCP, Specialist, Home health nurse, Urgent Care,  ED, 911 Yes   TCM call completed? Yes   Wrap up additional comments Pt still very weak and tired, LE very weak, but he does feel it is better than before hosp admit. No med changes. Pt will call Dr Henry tomorroow to sched TCM [facility]          Kay Ocampo MA    12/9/2021, 16:24 EST

## 2021-12-09 NOTE — OUTREACH NOTE
Call Center TCM Note      Responses   [facility]? [facility]   Does the patient have one of the following disease processes/diagnoses(primary or secondary)? COVID-19   COVID-19 underlying condition? None   TCM attempt successful? No   Unsuccessful attempts Attempt 1   Discharge diagnosis Bilateral leg weakness,  covid          Kay Ocampo MA    12/9/2021, 14:17 EST

## 2021-12-09 NOTE — CASE MANAGEMENT/SOCIAL WORK
Case Management Discharge Note                Selected Continued Care - Discharged on 12/8/2021 Admission date: 12/6/2021 - Discharge disposition: Home or Self Care                             Final Discharge Disposition Code: 01 - home or self-care

## 2021-12-10 ENCOUNTER — READMISSION MANAGEMENT (OUTPATIENT)
Dept: CALL CENTER | Facility: HOSPITAL | Age: 71
End: 2021-12-10

## 2021-12-10 NOTE — OUTREACH NOTE
COVID-19 Week 1 Survey      Responses   [facility] [place] patient discharged [facility]? [facility]   Does the patient have one of the following disease processes/diagnoses(primary or secondary)? COVID-19   COVID-19 underlying condition? None   Call Number Call 2   Week 1 Call successful? No   Discharge diagnosis Bilateral leg weakness,  kenyonid          Analilia Preston RN

## 2021-12-11 ENCOUNTER — READMISSION MANAGEMENT (OUTPATIENT)
Dept: CALL CENTER | Facility: HOSPITAL | Age: 71
End: 2021-12-11

## 2021-12-11 NOTE — OUTREACH NOTE
COVID-19 Week 1 Survey      Responses   [facility] patient discharged [facility]? [facility]   Does the patient have one of the following disease processes/diagnoses(primary or secondary)? COVID-19   COVID-19 underlying condition? None   Call Number Call 3   Week 1 Call successful? Yes   Call start time 0955   Call end time 1000   Discharge diagnosis Bilateral leg weakness,  covid   Does the patient have a primary care provider?  Yes   Comments regarding PCP PCP f/u on 1/12/21   Does the patient have an appointment with their PCP or specialist within 7 days of discharge? Greater than 7 days   Nursing Interventions Verified appointment date/time/provider   Has the patient kept scheduled appointments due by today? N/A   Has home health visited the patient within 72 hours of discharge? N/A   Psychosocial issues? No   Did the patient receive a copy of their discharge instructions? Yes   Did the patient receive a copy of COVID-19 specific instructions? Yes   Nursing interventions Reviewed instructions with patient   What is the patient's perception of their health status since discharge? Improving  [Weakness, fatigue]   Does the patient have any of the following symptoms? Cough   Nursing Interventions Nurse provided patient education   Pulse Ox monitoring None   Is the patient/caregiver able to teach back steps to recovery at home? Set small, achievable goals for return to baseline health,  Rest and rebuild strength, gradually increase activity,  Eat a well-balance diet   If the patient is a current smoker, are they able to teach back resources for cessation? Not a smoker   Is the patient/caregiver able to teach back the hierarchy of who to call/visit for symptoms/problems? PCP, Specialist, Home health nurse, Urgent Care, ED, 911 Yes   COVID-19 call completed? Yes   Wrap up additional comments Pt still very weak, some cough at times but not SOA. Enc'd building activity, adequate nutriition to rebuild strength.  PCP f/u  scheduled but unable to obtain appt until mid Jan.          Addie Arreola RN

## 2021-12-14 ENCOUNTER — READMISSION MANAGEMENT (OUTPATIENT)
Dept: CALL CENTER | Facility: HOSPITAL | Age: 71
End: 2021-12-14

## 2021-12-14 ENCOUNTER — TELEPHONE (OUTPATIENT)
Dept: FAMILY MEDICINE CLINIC | Facility: CLINIC | Age: 71
End: 2021-12-14

## 2021-12-14 NOTE — TELEPHONE ENCOUNTER
Called laura and he states he is very weak. He doesn't know if he can get into our office for and appointment. He is having a hard time getting up and going to the bathroom. I told laura he should go back to the er in this case. He may need blood work or an iv. He said he would come in on Friday but he is not going back to the hospital. I got him in on friday

## 2021-12-14 NOTE — OUTREACH NOTE
COVID-19 Week 2 Survey      Responses   [facility] patient discharged [facility]? [facility]   Does the patient have one of the following disease processes/diagnoses(primary or secondary)? COVID-19   COVID-19 underlying condition? None   Call Number Call 1   COVID-19 Week 2: Call 1 attempt successful? Yes   Call start time 1149   Call end time 1157   Discharge diagnosis Bilateral leg weakness,  covid   Person spoke with today (if not patient) and relationship Patient   Meds reviewed with patient/caregiver? Yes   Is the patient having any side effects they believe may be caused by any medication additions or changes? No   Does the patient have all medications ordered at discharge? Yes   Is the patient taking all medications as directed (includes completed medication regime)? Yes   Does the patient have a primary care provider?  Yes   Does the patient have an appointment with their PCP or specialist within 7 days of discharge? Greater than 7 days   Nursing Interventions Verified appointment date/time/provider   Has the patient kept scheduled appointments due by today? N/A   Psychosocial issues? No   What is the patient's perception of their health status since discharge? Improving   Does the patient have any of the following symptoms? Cough   Nursing Interventions Nurse provided patient education   Pulse Ox monitoring Intermittent   Pulse Ox device source Patient   O2 Sat comments 97% RA   O2 Sat: education provided Sat levels,  Monitoring frequency,  When to seek care   Is the patient/caregiver able to teach back steps to recovery at home? Rest and rebuild strength, gradually increase activity,  Eat a well-balance diet   COVID-19 call completed? Yes   Wrap up additional comments Pt states he is still feeling weak,  reports an occasional cough. Pt inquired when he can come out of quarantine,  pt was advised to call PCP office as quarantine length is up to PCP discretion. Pt verbalized understanding.          Analilia  ATILIO Preston

## 2021-12-14 NOTE — TELEPHONE ENCOUNTER
Caller: Emnauel Alcantara    Relationship to patient: Self    Best call back number: [phone]    Chief complaint: TESTED POSITIVE FOR COVID ON 12/6/21 STILL HAVING WEAKNESS    Type of visit: VIRTUAL VIA PHONE     Requested date: ASAP    If rescheduling, when is the original appointment: N/A    Additional notes: A PROVIDER WITH  ER WHO HAS BEEN CHECKING ON PATIENT ADVISED HIM TO GET A PHONE VISIT ASAP WITH PCP AS PATIENT IS HAVING WEAKNESS. THIS WOULD NOT REPLACE HOSPITAL FOLLOW UP WITH DR PERKINS.

## 2021-12-15 NOTE — PROGRESS NOTES
Subjective   Emanuel Alcantara is a 71 y.o. male.     Chief Complaint   Patient presents with   • Hospital Follow Up Visit     [facility]   • covid19   • Extremity Weakness       Emanuel was seen at [facility] . He was admitted on 12/6/2021  for extremety weakness and hip pain. He was discharged on 12/8/2021. Discharge diagnosis was covid 19 and bilateral leg weakness. Labs that were performed during the encounter included: CBC-abnormal, BMP-abnormal,Covid-19-positive, Vitb12-elevated to 1,324,TSH-normal,T4-normal, Urine Drug screen-normal,Hecavy Metal screen-normal, W-plmyz-xzsdpxyi 0.71,CRP-normal, CK-abnormally high, and Aldolase-abnormal high . Diagnostic studies that were performed included: MRI-Cspine with and without-Multilevel degenerative changes are present, MRI-Brain without-Minimal periventricular white matter changes which while nonspecific  could reflect more chronic small vessel ischemic change,Some prominence to the lateral ventricles which may be reflective of some underlying volume loss,Suggested fissure within the clivus that could be developmental, and CT Scan-head without-normal. Currently Emanuel receives care at home. Complications from the hospital stay include none. The patient stated that they do not need help with their daily life and activities. The patient stated that they do have emotional support at home.    Neurology consult while in hospital. Patient has reached out to Dr Lagos at [facility] for further evaluation     Extremity Weakness   The pain is present in the left upper leg, left lower leg, right lower leg, right upper leg, left arm and right arm. This is a new problem. The current episode started 1 to 4 weeks ago. There has been no history of extremity trauma. The problem occurs daily. The problem has been rapidly improving. The pain is at a severity of 0/10. The patient is experiencing no pain. Associated symptoms include numbness and tingling. Pertinent negatives include no fever or  stiffness. He has tried nothing for the symptoms.            I personally reviewed and updated the patient's allergies, medications, problem list, and past medical, surgical, social, and family history. I have reviewed and confirmed the accuracy of the History of Present Illness and Review of Symptoms as documented by the MA/LPN/RN. Motny Henry MD    Family History   Problem Relation Age of Onset   • Hypertension Mother    • Osteoporosis Mother    • Glaucoma Mother    • Macular degeneration Mother    • Heart failure Father    • Hypertension Father    • Obesity Father    • Hypertension Sister    • Stomach cancer Maternal Grandfather    • Diabetes Paternal Grandmother    • Pancreatic cancer Paternal Grandmother    • Aneurysm Paternal Grandfather        Social History     Tobacco Use   • Smoking status: Never Smoker   • Smokeless tobacco: Never Used   Vaping Use   • Vaping Use: Never used   Substance Use Topics   • Alcohol use: Not Currently   • Drug use: Never       History reviewed. No pertinent surgical history.    Patient Active Problem List   Diagnosis   • Allergic rhinitis   • Benign prostatic hyperplasia   • Mixed hyperlipidemia   • Essential hypertension   • Osteoarthritis   • Prostatitis, chronic   • Encounter for screening for malignant neoplasm of prostate   • Balanitis   • Direct inguinal hernia   • Medicare annual wellness visit, subsequent   • Nevus   • Dysplastic nevus   • Bilateral leg weakness   • Bilateral arm weakness   • Paresthesia of foot, bilateral   • Paresthesia of hand, bilateral   • Ataxia   • Multiple falls   • COVID-19 virus infection   • Weakness   • Cytokine release syndrome, grade 1         Current Outpatient Medications:   •  atenolol (TENORMIN) 25 MG tablet, Take 1 tablet by mouth Daily., Disp: 90 tablet, Rfl: 3  •  rosuvastatin (CRESTOR) 5 MG tablet, Take 5 mg by mouth Every Other Day., Disp: , Rfl:          Review of Systems   Constitutional: Negative for chills, diaphoresis and  "fever.   Eyes: Negative for visual disturbance.   Respiratory: Negative for shortness of breath.    Cardiovascular: Negative for chest pain and palpitations.   Gastrointestinal: Negative for abdominal pain and nausea.   Endocrine: Negative for polydipsia and polyphagia.   Musculoskeletal: Positive for extremity weakness. Negative for neck stiffness and stiffness.   Skin: Negative for color change and pallor.   Neurological: Positive for tingling and numbness. Negative for seizures and syncope.   Hematological: Negative for adenopathy.   Psychiatric/Behavioral: Negative for hallucinations and suicidal ideas.       I have reviewed and confirmed the accuracy of the ROS as documented by the MA/LPN/RN Monty Henry MD      Objective   /94   Pulse 84   Temp 97.1 °F (36.2 °C)   Resp 18   Ht 182.9 cm (72\")   Wt 78 kg (172 lb) Comment: patient reports  SpO2 98%   BMI 23.33 kg/m²   BP Readings from Last 3 Encounters:   12/17/21 142/94   12/08/21 147/80   10/05/21 180/96     Wt Readings from Last 3 Encounters:   12/17/21 78 kg (172 lb)   12/08/21 73.1 kg (161 lb 2.5 oz)   10/05/21 78.7 kg (173 lb 6.4 oz)     Physical Exam  Constitutional:       Appearance: Normal appearance. He is well-developed. He is not diaphoretic.   Cardiovascular:      Rate and Rhythm: Normal rate and regular rhythm.      Pulses: Normal pulses.      Heart sounds: Normal heart sounds, S1 normal and S2 normal. No murmur heard.  No friction rub. No gallop.    Pulmonary:      Effort: Pulmonary effort is normal. No accessory muscle usage.      Breath sounds: Normal breath sounds. No stridor. No decreased breath sounds, wheezing, rhonchi or rales.   Abdominal:      General: Bowel sounds are normal. There is no distension.      Palpations: Abdomen is soft. Abdomen is not rigid. There is no mass or pulsatile mass.      Tenderness: There is no abdominal tenderness. There is no guarding or rebound. Negative signs include Castillo's sign.      Hernia: " No hernia is present.   Skin:     General: Skin is warm and dry.      Coloration: Skin is not pale.   Neurological:      Mental Status: He is alert and oriented to person, place, and time.      Cranial Nerves: No cranial nerve deficit.      Sensory: No sensory deficit.      Motor: No tremor, abnormal muscle tone or seizure activity.      Coordination: Coordination normal.      Gait: Gait normal.      Deep Tendon Reflexes: Reflexes are normal and symmetric.         Data / Lab Results:    No results found for: HGBA1C     Lab Results   Component Value Date     (H) 09/13/2021     (H) 03/08/2021     (H) 09/01/2020     Lab Results   Component Value Date    CHOL 232 (H) 03/08/2019    CHOL 227 (H) 11/26/2018    CHOL 214 (H) 06/17/2016     Lab Results   Component Value Date    TRIG 61 09/13/2021    TRIG 91 03/08/2021    TRIG 76 09/01/2020     Lab Results   Component Value Date    HDL 44 09/13/2021    HDL 47 03/08/2021    HDL 47 09/01/2020     Lab Results   Component Value Date    PSA 1.3 09/13/2021    PSA 1.2 09/01/2020    PSA 1.0 11/26/2018     Lab Results   Component Value Date    WBC 4.50 12/07/2021    HGB 14.9 12/07/2021    HCT 42.2 12/07/2021    MCV 88.5 12/07/2021     12/07/2021     Lab Results   Component Value Date    TSH 3.750 12/06/2021      Lab Results   Component Value Date    GLUCOSE 99 12/07/2021    BUN 14 12/07/2021    CREATININE 0.75 (L) 12/07/2021    EGFRIFNONA 104 12/07/2021    EGFRIFAFRI 95 09/13/2021    BCR 18.7 12/07/2021    K 3.8 12/07/2021    CO2 23.0 12/07/2021    CALCIUM 8.7 12/07/2021    PROTENTOTREF 7.3 09/13/2021    ALBUMIN 4.50 12/06/2021    LABIL2 1.7 09/13/2021    AST 46 (H) 12/06/2021    ALT 29 12/06/2021     Lab Results   Component Value Date    SEDRATE 21 (H) 12/06/2021      Lab Results   Component Value Date    CRP 0.47 12/06/2021      No results found for: IRON, TIBC, FERRITIN   Lab Results   Component Value Date    BHNMZITE32 1,324 (H) 12/06/2021           Assessment/Plan      Medications        Problem List         LO    COVID-19 the mild respiratory function.  Clinically improved/resolving today.  Recommend follow-up for vaccination.  Proximal muscle weakness.  Status post benign MRI brain/C-spine/neurology eval inpatient.  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  Clinically much improved today.  Rehabilitation exercises discussed.  Has neurology follow-up upcoming.  Consider steroid Rx, PT if persistent symptoms  Health maintenance.  Doing well.  Recommend 23 valent Pneumovax he states he will consider.  Tolerating coated baby aspirin every other day.  Discussed health maintenance, screening test, lifestyle modification.  Hypertension.    Much improved today on increased atenolol to 25 mg daily.  Home blood pressure monitor results reviewed/good control.  Remote history of benign cardiac stress testing, recommend repeat stress echo, carotid Dopplers he states he will consider next year.    Palpitations.  History of, much improved on atenolol.  Limit caffeine.  Hyperlipidemia.  Much improved today tolerating Crestor 5 mg 3 days/week, LDL to 100.  Discussed diet and exercise lifestyle to modification.  Discussed diet, exercise, lifestyle modification.   Direct inguinal hernia.  Small/nontender, asymptomatic currently.  Continue to monitor clinically.  Consider surgery referral if worsening symptoms.  Asymmetric abdominal fat.  He is concerned about a prominence of his abdominal wall which is a symmetric.  Benign exam today, asymptomatic.  Reassurance given.  Has had CT abdomen will get records.  Follow-up recheck.  Consider repeat imaging, surgery referral if worsening symptoms.  Colon screening.  Has had colonoscopy 2015, repeat was recommended in 10 years, will get the records.  Followed by [facility].  Screening for prostate cancer.  PSA benign.  Long discussion today, recommend COVID-19 vaccination he states he will consider.  Dysplastic nevus.  With severe  atypia.  Right forearm, healing well status post resection, sutures removed today.  Margins clear but close, recommend further resection, dermatology referral scheduled.  Positive multiple nevi on exam, benign appearance today.  Sun protection discussed.  Follow-up for yearly skin exams.-Positive family history melanoma, father.           Diagnoses and all orders for this visit:    1. Hospital discharge follow-up (Primary)    2. COVID-19 virus infection    3. Weakness              Expected course, medications, and adverse effects discussed.  Call or return if worsening or persistent symptoms.  I wore protective equipment throughout this patient encounter including a mask, gloves, and eye protection.  Hand hygiene was performed before donning protective equipment and after removal when leaving the room. The complete contents of the Assessment and Plan and Data/Lab Results as documented above have been reviewed and addressed by myself with the patient today as part of an ongoing evaluation / treatment plan.  If some of the documentation has been copied from a previous note and is unchanged it indicates that this problem / plan has been assessed today but is stable from a previous visit and no changes have been recommended.

## 2021-12-17 ENCOUNTER — OFFICE VISIT (OUTPATIENT)
Dept: FAMILY MEDICINE CLINIC | Facility: CLINIC | Age: 71
End: 2021-12-17

## 2021-12-17 VITALS
OXYGEN SATURATION: 98 % | RESPIRATION RATE: 18 BRPM | SYSTOLIC BLOOD PRESSURE: 142 MMHG | HEIGHT: 72 IN | HEART RATE: 84 BPM | DIASTOLIC BLOOD PRESSURE: 94 MMHG | BODY MASS INDEX: 23.3 KG/M2 | TEMPERATURE: 97.1 F | WEIGHT: 172 LBS

## 2021-12-17 DIAGNOSIS — R53.1 WEAKNESS: ICD-10-CM

## 2021-12-17 DIAGNOSIS — Z09 HOSPITAL DISCHARGE FOLLOW-UP: Primary | ICD-10-CM

## 2021-12-17 DIAGNOSIS — U07.1 COVID-19 VIRUS INFECTION: ICD-10-CM

## 2021-12-17 PROCEDURE — 99213 OFFICE O/P EST LOW 20 MIN: CPT | Performed by: FAMILY MEDICINE

## 2021-12-20 ENCOUNTER — TELEPHONE (OUTPATIENT)
Dept: FAMILY MEDICINE CLINIC | Facility: CLINIC | Age: 71
End: 2021-12-20

## 2021-12-20 NOTE — TELEPHONE ENCOUNTER
Patient called stating he was having left leg pain and did not know if this was a post-COVID issue

## 2021-12-21 NOTE — TELEPHONE ENCOUNTER
PATIENT STATES THAT HIS LEFT LEG IS GETTING LESS SWOLLEN. PATIENT STATES IT IS A LOT BETTER. JUST WANTED TO GIVE AN UPDATE.     PLEASE ADVISE PATIENT ON WHAT HE NEEDS TO DO.  [phone]

## 2021-12-22 NOTE — TELEPHONE ENCOUNTER
Check on Emanuel, make sure he is improving, he should be okay as long as his swelling is continue to improve, confirm that he is taking a coated base of the aspirin daily, the concern I would have is that he could get a blood clot in his leg from his weakness and inactivity, if he does not improve or symptoms start to recur should come into the office so can take a look at his leg and consider an ultrasound to check for blood clot, thanks

## 2021-12-23 NOTE — TELEPHONE ENCOUNTER
He said swelling is pretty much gone. He will start a baby aspirin daily and he said his activity is increasing daily.

## 2022-01-06 ENCOUNTER — TELEPHONE (OUTPATIENT)
Dept: FAMILY MEDICINE CLINIC | Facility: CLINIC | Age: 72
End: 2022-01-06

## 2022-01-06 NOTE — TELEPHONE ENCOUNTER
Caller: Emanuel Alcantara    Relationship to patient: Self    Best call back number: [phone]    Patient is needing: PT IS CALLING ABOUT A REFERRAL TO NEUROLOGY. HE WAS SEEN IN THE HOSPITAL FOR NUMBNESS IN FEET, ANKLES AND FINGERS AND FOR SOME OTHER ISSUES. HE WAS TOLD TO CONTACT PCP FOR A REFERRAL. PLEASE CONTACT PT WHEN POSSIBLE

## 2022-01-06 NOTE — TELEPHONE ENCOUNTER
Yes, I do want him to see neurology for leg weakness, we may have started this referral at his hospital follow-up visit, thanks

## 2022-01-13 NOTE — TELEPHONE ENCOUNTER
Who do you want him to see? In his neurology note from the hospital discharge I see the nurse practioner stated to follow up with neuromuscular team at [facility]. Or do you want to send to ?

## 2022-01-13 NOTE — TELEPHONE ENCOUNTER
Emanuel just called a little bit ago again and said if he doesn't have to [place] he would prefer that.

## 2022-01-18 ENCOUNTER — OFFICE VISIT (OUTPATIENT)
Dept: FAMILY MEDICINE CLINIC | Facility: CLINIC | Age: 72
End: 2022-01-18

## 2022-01-18 VITALS
TEMPERATURE: 97.8 F | OXYGEN SATURATION: 98 % | SYSTOLIC BLOOD PRESSURE: 140 MMHG | WEIGHT: 167 LBS | HEART RATE: 91 BPM | HEIGHT: 72 IN | DIASTOLIC BLOOD PRESSURE: 102 MMHG | BODY MASS INDEX: 22.62 KG/M2 | RESPIRATION RATE: 18 BRPM

## 2022-01-18 DIAGNOSIS — R29.898 BILATERAL LEG WEAKNESS: ICD-10-CM

## 2022-01-18 DIAGNOSIS — R20.2 PARESTHESIA OF HAND, BILATERAL: ICD-10-CM

## 2022-01-18 DIAGNOSIS — R29.898 BILATERAL ARM WEAKNESS: Primary | ICD-10-CM

## 2022-01-18 DIAGNOSIS — R20.2 PARESTHESIA OF FOOT, BILATERAL: ICD-10-CM

## 2022-01-18 DIAGNOSIS — I10 ESSENTIAL HYPERTENSION: ICD-10-CM

## 2022-01-18 DIAGNOSIS — R29.6 MULTIPLE FALLS: ICD-10-CM

## 2022-01-18 PROCEDURE — 99214 OFFICE O/P EST MOD 30 MIN: CPT | Performed by: FAMILY MEDICINE

## 2022-01-18 RX ORDER — ATENOLOL 50 MG/1
50 TABLET ORAL DAILY
Qty: 90 TABLET | Refills: 3 | Status: SHIPPED | OUTPATIENT
Start: 2022-01-18 | End: 2022-05-10

## 2022-01-18 NOTE — PROGRESS NOTES
Subjective   Emanuel Alcantara is a 71 y.o. male.     Chief Complaint   Patient presents with   • Extremity Weakness   • Hypertension       Extremity Weakness   The pain is present in the left upper leg, left lower leg, right lower leg, right upper leg, left arm and right arm. This is a new problem. The current episode started more than 1 month ago. There has been no history of extremity trauma. The problem occurs daily. The problem has been gradually improving. The pain is at a severity of 0/10. The patient is experiencing no pain. Associated symptoms include numbness and tingling. Pertinent negatives include no fever or stiffness. He has tried nothing for the symptoms.   Hypertension  This is a chronic problem. The current episode started more than 1 year ago. The problem is uncontrolled. Pertinent negatives include no chest pain, headaches, palpitations, shortness of breath or sweats. Risk factors for coronary artery disease include male gender and dyslipidemia. Current antihypertension treatment includes beta blockers. The current treatment provides moderate improvement.            I personally reviewed and updated the patient's allergies, medications, problem list, and past medical, surgical, social, and family history. I have reviewed and confirmed the accuracy of the History of Present Illness and Review of Symptoms as documented by the MA/LPN/RN. Monty Henry MD    Family History   Problem Relation Age of Onset   • Hypertension Mother    • Osteoporosis Mother    • Glaucoma Mother    • Macular degeneration Mother    • Heart failure Father    • Hypertension Father    • Obesity Father    • Hypertension Sister    • Stomach cancer Maternal Grandfather    • Diabetes Paternal Grandmother    • Pancreatic cancer Paternal Grandmother    • Aneurysm Paternal Grandfather        Social History     Tobacco Use   • Smoking status: Never Smoker   • Smokeless tobacco: Never Used   Vaping Use   • Vaping Use: Never used    Substance Use Topics   • Alcohol use: Not Currently   • Drug use: Never       No past surgical history on file.    Patient Active Problem List   Diagnosis   • Allergic rhinitis   • Benign prostatic hyperplasia   • Mixed hyperlipidemia   • Essential hypertension   • Osteoarthritis   • Prostatitis, chronic   • Encounter for screening for malignant neoplasm of prostate   • Balanitis   • Direct inguinal hernia   • Medicare annual wellness visit, subsequent   • Nevus   • Dysplastic nevus   • Bilateral leg weakness   • Bilateral arm weakness   • Paresthesia of foot, bilateral   • Paresthesia of hand, bilateral   • Ataxia   • Multiple falls   • COVID-19 virus infection   • Weakness   • Cytokine release syndrome, grade 1         Current Outpatient Medications:   •  atenolol (TENORMIN) 50 MG tablet, Take 1 tablet by mouth Daily., Disp: 90 tablet, Rfl: 3  •  rosuvastatin (CRESTOR) 5 MG tablet, Take 5 mg by mouth Every Other Day., Disp: , Rfl:          Review of Systems   Constitutional: Negative for chills, diaphoresis and fever.   HENT: Negative for trouble swallowing and voice change.    Eyes: Negative for visual disturbance.   Respiratory: Negative for shortness of breath.    Cardiovascular: Negative for chest pain and palpitations.   Gastrointestinal: Negative for abdominal pain and nausea.   Endocrine: Negative for polydipsia and polyphagia.   Genitourinary: Negative for hematuria.   Musculoskeletal: Positive for extremity weakness. Negative for neck stiffness and stiffness.   Skin: Negative for color change and pallor.   Allergic/Immunologic: Negative for immunocompromised state.   Neurological: Positive for tingling and numbness. Negative for seizures and syncope.   Hematological: Negative for adenopathy.   Psychiatric/Behavioral: Negative for sleep disturbance and suicidal ideas.       I have reviewed and confirmed the accuracy of the ROS as documented by the MA/LPN/RN Monty Henry MD      Objective   BP (!)  "140/102 (BP Location: Right arm, Patient Position: Sitting, Cuff Size: Adult)   Pulse 91   Temp 97.8 °F (36.6 °C) (Temporal)   Resp 18   Ht 182.9 cm (72\")   Wt 75.8 kg (167 lb)   SpO2 98% Comment: room air  BMI 22.65 kg/m²   BP Readings from Last 3 Encounters:   01/18/22 (!) 140/102   12/17/21 142/94   12/08/21 147/80     Wt Readings from Last 3 Encounters:   01/18/22 75.8 kg (167 lb)   12/17/21 78 kg (172 lb)   12/08/21 73.1 kg (161 lb 2.5 oz)     Physical Exam  Constitutional:       Appearance: He is well-developed. He is not diaphoretic.   HENT:      Head: Normocephalic.      Right Ear: Hearing, tympanic membrane, ear canal and external ear normal.      Left Ear: Hearing, tympanic membrane, ear canal and external ear normal.      Nose: Nose normal. No mucosal edema or congestion.      Right Sinus: No maxillary sinus tenderness or frontal sinus tenderness.      Left Sinus: No maxillary sinus tenderness or frontal sinus tenderness.      Mouth/Throat:      Mouth: No oral lesions.      Pharynx: Uvula midline. No oropharyngeal exudate or posterior oropharyngeal erythema.      Tonsils: No tonsillar exudate.   Eyes:      General: Lids are normal.      Conjunctiva/sclera: Conjunctivae normal.      Pupils: Pupils are equal, round, and reactive to light.   Neck:      Thyroid: No thyromegaly.      Vascular: No carotid bruit or JVD.      Trachea: No tracheal deviation.   Cardiovascular:      Rate and Rhythm: Normal rate and regular rhythm.      Heart sounds: Normal heart sounds. No murmur heard.  No friction rub. No gallop.    Pulmonary:      Effort: Pulmonary effort is normal.      Breath sounds: Normal breath sounds. No stridor. No decreased breath sounds, wheezing or rales.   Abdominal:      General: Bowel sounds are normal. There is no distension.      Palpations: Abdomen is soft. There is no mass.      Tenderness: There is no abdominal tenderness. There is no guarding or rebound.      Hernia: No hernia is " present.   Lymphadenopathy:      Head:      Right side of head: No submental, submandibular, tonsillar, preauricular, posterior auricular or occipital adenopathy.      Left side of head: No submental, submandibular, tonsillar, preauricular, posterior auricular or occipital adenopathy.      Cervical: No cervical adenopathy.   Skin:     General: Skin is warm and dry.      Coloration: Skin is not pale.   Neurological:      Mental Status: He is alert and oriented to person, place, and time.      Cranial Nerves: No cranial nerve deficit.      Sensory: No sensory deficit.      Motor: No tremor, abnormal muscle tone or seizure activity.      Coordination: Coordination normal.      Gait: Gait normal.      Deep Tendon Reflexes: Reflexes are normal and symmetric.         Data / Lab Results:    No results found for: HGBA1C     Lab Results   Component Value Date     (H) 09/13/2021     (H) 03/08/2021     (H) 09/01/2020     Lab Results   Component Value Date    CHOL 232 (H) 03/08/2019    CHOL 227 (H) 11/26/2018    CHOL 214 (H) 06/17/2016     Lab Results   Component Value Date    TRIG 61 09/13/2021    TRIG 91 03/08/2021    TRIG 76 09/01/2020     Lab Results   Component Value Date    HDL 44 09/13/2021    HDL 47 03/08/2021    HDL 47 09/01/2020     Lab Results   Component Value Date    PSA 1.3 09/13/2021    PSA 1.2 09/01/2020    PSA 1.0 11/26/2018     Lab Results   Component Value Date    WBC 4.50 12/07/2021    HGB 14.9 12/07/2021    HCT 42.2 12/07/2021    MCV 88.5 12/07/2021     12/07/2021     Lab Results   Component Value Date    TSH 3.750 12/06/2021      Lab Results   Component Value Date    GLUCOSE 99 12/07/2021    BUN 14 12/07/2021    CREATININE 0.75 (L) 12/07/2021    EGFRIFNONA 104 12/07/2021    EGFRIFAFRI 95 09/13/2021    BCR 18.7 12/07/2021    K 3.8 12/07/2021    CO2 23.0 12/07/2021    CALCIUM 8.7 12/07/2021    PROTENTOTREF 7.3 09/13/2021    ALBUMIN 4.50 12/06/2021    LABIL2 1.7 09/13/2021    AST  46 (H) 12/06/2021    ALT 29 12/06/2021     Lab Results   Component Value Date    SEDRATE 21 (H) 12/06/2021      Lab Results   Component Value Date    CRP 0.47 12/06/2021      No results found for: IRON, TIBC, FERRITIN   Lab Results   Component Value Date    POZILJZU19 1,324 (H) 12/06/2021          Assessment/Plan      Medications        Problem List         LOS  COVID-19 viral upper respiratory function.  Clinically improved/resolving today.  Recommend follow-up for vaccination.  Proximal muscle weakness.  Making steady improvement today. Status post benign MRI brain/C-spine/neurology eval inpatient.  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  Clinically much improved today.  Rehabilitation exercises discussed.  Has neurology follow-up upcoming.  Consider steroid Rx, PT if persistent symptoms  Health maintenance.  Doing well.  Recommend 23 valent Pneumovax he states he will consider.  Tolerating coated baby aspirin every other day.  Discussed health maintenance, screening test, lifestyle modification.  Hypertension.  Worse, home blood pressure results reviewed, increase atenolol to 50 mg daily.  Home blood pressure monitor results reviewed/good control.  Remote history of benign cardiac stress testing, recommend repeat stress echo, carotid Dopplers he states he will consider next year.    Palpitations.  History of, much improved on atenolol.  Limit caffeine.  Hyperlipidemia.  Much improved today tolerating Crestor 5 mg 3 days/week, LDL to 100.  Discussed diet and exercise lifestyle to modification.  Discussed diet, exercise, lifestyle modification.   Direct inguinal hernia.  Small/nontender, asymptomatic currently.  Continue to monitor clinically.  Consider surgery referral if worsening symptoms.  Asymmetric abdominal fat.  He is concerned about a prominence of his abdominal wall which is a symmetric.  Benign exam today, asymptomatic.  Reassurance given.  Has had CT abdomen will get records.  Follow-up recheck.   Consider repeat imaging, surgery referral if worsening symptoms.  Colon screening.  Has had colonoscopy 2015, repeat was recommended in 10 years, will get the records.  Followed by [facility].  Screening for prostate cancer.  PSA benign.  Long discussion today, recommend COVID-19 vaccination he states he will consider.  Dysplastic nevus.  With severe atypia.  Right forearm, healing well status post resection, sutures removed today.  Margins clear but close, recommend further resection, dermatology referral scheduled.  Positive multiple nevi on exam, benign appearance today.  Sun protection discussed.  Follow-up for yearly skin exams.-Positive family history melanoma, father.           Diagnoses and all orders for this visit:    1. Bilateral arm weakness (Primary)  -     Ambulatory Referral to Neurology    2. Bilateral leg weakness  -     Ambulatory Referral to Neurology    3. Multiple falls    4. Essential hypertension  -     atenolol (TENORMIN) 50 MG tablet; Take 1 tablet by mouth Daily.  Dispense: 90 tablet; Refill: 3    5. Paresthesia of foot, bilateral    6. Paresthesia of hand, bilateral              Expected course, medications, and adverse effects discussed.  Call or return if worsening or persistent symptoms.  I wore protective equipment throughout this patient encounter including a mask, gloves, and eye protection.  Hand hygiene was performed before donning protective equipment and after removal when leaving the room. The complete contents of the Assessment and Plan and Data/Lab Results as documented above have been reviewed and addressed by myself with the patient today as part of an ongoing evaluation / treatment plan.  If some of the documentation has been copied from a previous note and is unchanged it indicates that this problem / plan has been assessed today but is stable from a previous visit and no changes have been recommended.

## 2022-02-17 ENCOUNTER — TELEPHONE (OUTPATIENT)
Dept: FAMILY MEDICINE CLINIC | Facility: CLINIC | Age: 72
End: 2022-02-17

## 2022-04-25 RX ORDER — ROSUVASTATIN CALCIUM 5 MG/1
TABLET, COATED ORAL
Qty: 90 TABLET | Refills: 0 | Status: SHIPPED | OUTPATIENT
Start: 2022-04-25 | End: 2022-11-04 | Stop reason: SDUPTHER

## 2022-05-09 ENCOUNTER — TELEPHONE (OUTPATIENT)
Dept: FAMILY MEDICINE CLINIC | Facility: CLINIC | Age: 72
End: 2022-05-09

## 2022-05-09 NOTE — PROGRESS NOTES
Subjective   Emanuel Alcantara is a 71 y.o. male.     Chief Complaint   Patient presents with   • Hypertension       Saturday blood pressures remained elevated throughout the day. Emanuel called and spoke with the oncall Dr (Dr Nice) and she recommended that he take an extra half tablet of atenolol. He did so and blood pressure remained elevated. He reports that Saturday he took at total of 100mg and Sunday 75mg. He is now back to 50mg of atenolol     Hypertension  This is a chronic problem. The current episode started more than 1 year ago. The problem is uncontrolled. Pertinent negatives include no chest pain, headaches, malaise/fatigue, palpitations, shortness of breath or sweats. There are no associated agents to hypertension. Risk factors for coronary artery disease include male gender and dyslipidemia. Past treatments include lifestyle changes. Current antihypertension treatment includes beta blockers. The current treatment provides moderate improvement. There are no compliance problems.             I personally reviewed and updated the patient's allergies, medications, problem list, and past medical, surgical, social, and family history. I have reviewed and confirmed the accuracy of the History of Present Illness and Review of Symptoms as documented by the MA/LPN/RN. Monty Henry MD    Family History   Problem Relation Age of Onset   • Hypertension Mother    • Osteoporosis Mother    • Glaucoma Mother    • Macular degeneration Mother    • Heart failure Father    • Hypertension Father    • Obesity Father    • Hypertension Sister    • Stomach cancer Maternal Grandfather    • Diabetes Paternal Grandmother    • Pancreatic cancer Paternal Grandmother    • Aneurysm Paternal Grandfather        Social History     Tobacco Use   • Smoking status: Never Smoker   • Smokeless tobacco: Never Used   Vaping Use   • Vaping Use: Never used   Substance Use Topics   • Alcohol use: Not Currently   • Drug use: Never       History  "reviewed. No pertinent surgical history.    Patient Active Problem List   Diagnosis   • Allergic rhinitis   • Benign prostatic hyperplasia   • Mixed hyperlipidemia   • Essential hypertension   • Osteoarthritis   • Prostatitis, chronic   • Encounter for screening for malignant neoplasm of prostate   • Balanitis   • Direct inguinal hernia   • Medicare annual wellness visit, subsequent   • Nevus   • Dysplastic nevus   • Bilateral leg weakness   • Bilateral arm weakness   • Paresthesia of foot, bilateral   • Paresthesia of hand, bilateral   • Ataxia   • Multiple falls   • COVID-19 virus infection   • Weakness   • Cytokine release syndrome, grade 1         Current Outpatient Medications:   •  atenolol (TENORMIN) 50 MG tablet, Take one tablet in the morning and 1/2 tablet at night as needed for high blood pressure, Disp: 135 tablet, Rfl: 3  •  rosuvastatin (CRESTOR) 5 MG tablet, Take 1 tablet by mouth once daily, Disp: 90 tablet, Rfl: 0         Review of Systems   Constitutional: Negative for chills, diaphoresis and malaise/fatigue.   Eyes: Negative for visual disturbance.   Respiratory: Negative for shortness of breath.    Cardiovascular: Negative for chest pain and palpitations.   Gastrointestinal: Negative for abdominal pain and nausea.   Endocrine: Negative for polydipsia and polyphagia.   Musculoskeletal: Positive for extremity weakness. Negative for neck stiffness.   Skin: Negative for color change and pallor.   Neurological: Negative for seizures and syncope.   Hematological: Negative for adenopathy.       I have reviewed and confirmed the accuracy of the ROS as documented by the MA/LPN/RN Monty Henry MD      Objective   /98   Pulse 74   Temp 97.3 °F (36.3 °C)   Resp 18   Ht 182.9 cm (72\")   Wt 78.9 kg (174 lb)   SpO2 98%   BMI 23.60 kg/m²   BP Readings from Last 3 Encounters:   05/10/22 158/98   01/18/22 (!) 140/102   12/17/21 142/94     Wt Readings from Last 3 Encounters:   05/10/22 78.9 kg (174 " lb)   01/18/22 75.8 kg (167 lb)   12/17/21 78 kg (172 lb)     Physical Exam  Constitutional:       Appearance: Normal appearance. He is well-developed. He is not diaphoretic.   Cardiovascular:      Rate and Rhythm: Normal rate and regular rhythm.      Pulses: Normal pulses.      Heart sounds: Normal heart sounds, S1 normal and S2 normal. No murmur heard.    No friction rub. No gallop.   Pulmonary:      Effort: Pulmonary effort is normal. No accessory muscle usage.      Breath sounds: Normal breath sounds. No stridor. No decreased breath sounds, wheezing, rhonchi or rales.   Abdominal:      General: Bowel sounds are normal. There is no distension.      Palpations: Abdomen is soft. Abdomen is not rigid. There is no mass or pulsatile mass.      Tenderness: There is no abdominal tenderness. There is no guarding or rebound. Negative signs include Castillo's sign.      Hernia: No hernia is present.   Skin:     General: Skin is warm and dry.      Coloration: Skin is not pale.   Neurological:      Mental Status: He is alert and oriented to person, place, and time.      Cranial Nerves: No cranial nerve deficit.      Sensory: No sensory deficit.      Motor: No tremor, abnormal muscle tone or seizure activity.      Coordination: Coordination normal.      Gait: Gait normal.      Deep Tendon Reflexes: Reflexes are normal and symmetric.         Data / Lab Results:    No results found for: HGBA1C     Lab Results   Component Value Date     (H) 09/13/2021     (H) 03/08/2021     (H) 09/01/2020     Lab Results   Component Value Date    CHOL 232 (H) 03/08/2019    CHOL 227 (H) 11/26/2018    CHOL 214 (H) 06/17/2016     Lab Results   Component Value Date    TRIG 61 09/13/2021    TRIG 91 03/08/2021    TRIG 76 09/01/2020     Lab Results   Component Value Date    HDL 44 09/13/2021    HDL 47 03/08/2021    HDL 47 09/01/2020     Lab Results   Component Value Date    PSA 1.3 09/13/2021    PSA 1.2 09/01/2020    PSA 1.0  11/26/2018     Lab Results   Component Value Date    WBC 4.50 12/07/2021    HGB 14.9 12/07/2021    HCT 42.2 12/07/2021    MCV 88.5 12/07/2021     12/07/2021     Lab Results   Component Value Date    TSH 3.750 12/06/2021      Lab Results   Component Value Date    GLUCOSE 99 12/07/2021    BUN 14 12/07/2021    CREATININE 0.75 (L) 12/07/2021    EGFRIFNONA 104 12/07/2021    EGFRIFAFRI 95 09/13/2021    BCR 18.7 12/07/2021    K 3.8 12/07/2021    CO2 23.0 12/07/2021    CALCIUM 8.7 12/07/2021    PROTENTOTREF 7.3 09/13/2021    ALBUMIN 4.50 12/06/2021    LABIL2 1.7 09/13/2021    AST 46 (H) 12/06/2021    ALT 29 12/06/2021     Lab Results   Component Value Date    SEDRATE 21 (H) 12/06/2021      Lab Results   Component Value Date    CRP 0.47 12/06/2021      No results found for: IRON, TIBC, FERRITIN   Lab Results   Component Value Date    [id number] 1,324 (H) 12/06/2021          Assessment/Plan      Medications        Problem List         LOS    COVID-19 viral upper respiratory function.  Clinically improved/resolving today.  Recommend follow-up for vaccination.  Proximal muscle weakness.  Making steady improvement today. Status post benign MRI brain/C-spine/neurology eval inpatient.  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  Clinically improved today but having some persistent extremity numbness/weakness..  Rehabilitation exercises discussed.  Followed by neurology Dr. Ross DDx includes Guillain-Barré versus COPD.  Has neurology eval scheduled at tertiary care center for Dr. Block.  Consider steroid Rx, PT if persistent symptoms  Health maintenance.  Doing well.  Recommend 23 valent Pneumovax he states he will consider.  Tolerating coated baby aspirin every other day.  Discussed health maintenance, screening test, lifestyle modification.  Hypertension.  Worse, home blood pressure results reviewed, increase atenolol to 50 mg every morning, 25 mg every afternoon as needed..  Home blood pressure monitor results  reviewed.  Remote history of benign cardiac stress testing, recommend repeat stress echo, carotid Dopplers he states he will consider next year.    Palpitations.  History of, much improved on atenolol.  Limit caffeine.  Hyperlipidemia.  Much improved today tolerating Crestor 5 mg 3 days/week, LDL to 100.  Discussed diet and exercise lifestyle to modification.  Discussed diet, exercise, lifestyle modification.   Direct inguinal hernia.  Small/nontender, asymptomatic currently.  Continue to monitor clinically.  Consider surgery referral if worsening symptoms.  Asymmetric abdominal fat.  He is concerned about a prominence of his abdominal wall which is a symmetric.  Benign exam today, asymptomatic.  Reassurance given.  Has had CT abdomen will get records.  Follow-up recheck.  Consider repeat imaging, surgery referral if worsening symptoms.  Colon screening.  Has had colonoscopy 2015, repeat was recommended in 10 years, will get the records.  Followed by [facility].  Screening for prostate cancer.  PSA benign.  Long discussion today, recommend COVID-19 vaccination he states he will consider.  Dysplastic nevus.  With severe atypia.  Right forearm, healing well status post resection, sutures removed today.  Margins clear but close, recommend further resection, dermatology referral scheduled.  Positive multiple nevi on exam, benign appearance today.  Sun protection discussed.  Follow-up for yearly skin exams.-Positive family history melanoma, father.            Diagnoses and all orders for this visit:    1. Essential hypertension (Primary)  -     atenolol (TENORMIN) 50 MG tablet; Take one tablet in the morning and 1/2 tablet at night as needed for high blood pressure  Dispense: 135 tablet; Refill: 3    2. Weakness    3. Ataxia              Expected course, medications, and adverse effects discussed.  Call or return if worsening or persistent symptoms.  I wore protective equipment throughout this patient encounter including a  mask, gloves, and eye protection.  Hand hygiene was performed before donning protective equipment and after removal when leaving the room. The complete contents of the Assessment and Plan and Data/Lab Results as documented above have been reviewed and addressed by myself with the patient today as part of an ongoing evaluation / treatment plan.  If some of the documentation has been copied from a previous note and is unchanged it indicates that this problem / plan has been assessed today but is stable from a previous visit and no changes have been recommended.

## 2022-05-09 NOTE — TELEPHONE ENCOUNTER
Patient called into the office in regards to some blood pressure concerns he had experienced this past weekend.  Patient had taken blood pressure reading on Saturday and reported 192/105.  He was concerned so he called and spoke with the on call doctor at the time.  Dr. Nice recommended he cut in half one of his atenolol 50mg tablets and see how his blood pressure was doing.  Patient stated with exercises his blood pressure did decrease 163/92.  Throughout the day his blood pressure continued to decrease which made him feel better, but still seemed somewhat high, just not as increased as it was before.  He took a full tablet this morning of the atenolol 50mg tablet.  Patient is feeling ok today, just would like the nurse to review and call patient when possible.

## 2022-05-09 NOTE — TELEPHONE ENCOUNTER
Yes, lets have him keep monitoring his blood pressure frequently, writing it down, get him scheduled in the near for follow-up visit in the next few days/1 to 2 weeks, thanks

## 2022-05-10 ENCOUNTER — OFFICE VISIT (OUTPATIENT)
Dept: FAMILY MEDICINE CLINIC | Facility: CLINIC | Age: 72
End: 2022-05-10

## 2022-05-10 VITALS
HEART RATE: 74 BPM | TEMPERATURE: 97.3 F | OXYGEN SATURATION: 98 % | RESPIRATION RATE: 18 BRPM | SYSTOLIC BLOOD PRESSURE: 158 MMHG | HEIGHT: 72 IN | WEIGHT: 174 LBS | BODY MASS INDEX: 23.57 KG/M2 | DIASTOLIC BLOOD PRESSURE: 98 MMHG

## 2022-05-10 DIAGNOSIS — R53.1 WEAKNESS: ICD-10-CM

## 2022-05-10 DIAGNOSIS — R27.0 ATAXIA: ICD-10-CM

## 2022-05-10 DIAGNOSIS — I10 ESSENTIAL HYPERTENSION: Primary | ICD-10-CM

## 2022-05-10 PROCEDURE — 99214 OFFICE O/P EST MOD 30 MIN: CPT | Performed by: FAMILY MEDICINE

## 2022-05-10 RX ORDER — ATENOLOL 50 MG/1
TABLET ORAL
Qty: 135 TABLET | Refills: 3
Start: 2022-05-10 | End: 2022-07-12

## 2022-07-11 NOTE — PROGRESS NOTES
Subjective   Emanuel Alcantara is a 71 y.o. male.     Chief Complaint   Patient presents with   • Hypertension       Hypertension  This is a chronic problem. The current episode started more than 1 year ago. The problem is uncontrolled. Pertinent negatives include no chest pain, headaches, malaise/fatigue, palpitations or shortness of breath. Risk factors for coronary artery disease include dyslipidemia, male gender and family history. Current antihypertension treatment includes beta blockers. The current treatment provides moderate improvement.            I personally reviewed and updated the patient's allergies, medications, problem list, and past medical, surgical, social, and family history. I have reviewed and confirmed the accuracy of the History of Present Illness and Review of Symptoms as documented by the MA/LPN/RN. Monty Henry MD    Family History   Problem Relation Age of Onset   • Hypertension Mother    • Osteoporosis Mother    • Glaucoma Mother    • Macular degeneration Mother    • Heart failure Father    • Hypertension Father    • Obesity Father    • Hypertension Sister    • Stomach cancer Maternal Grandfather    • Diabetes Paternal Grandmother    • Pancreatic cancer Paternal Grandmother    • Aneurysm Paternal Grandfather        Social History     Tobacco Use   • Smoking status: Never Smoker   • Smokeless tobacco: Never Used   Vaping Use   • Vaping Use: Never used   Substance Use Topics   • Alcohol use: Not Currently   • Drug use: Never       No past surgical history on file.    Patient Active Problem List   Diagnosis   • Allergic rhinitis   • Benign prostatic hyperplasia   • Mixed hyperlipidemia   • Essential hypertension   • Osteoarthritis   • Prostatitis, chronic   • Encounter for screening for malignant neoplasm of prostate   • Balanitis   • Direct inguinal hernia   • Medicare annual wellness visit, subsequent   • Nevus   • Dysplastic nevus   • Bilateral leg weakness   • Bilateral arm weakness   •  "Paresthesia of foot, bilateral   • Paresthesia of hand, bilateral   • Ataxia   • Multiple falls   • COVID-19 virus infection   • Weakness   • Cytokine release syndrome, grade 1   • CIDP (chronic inflammatory demyelinating polyneuropathy) ([facility])         Current Outpatient Medications:   •  atenolol (TENORMIN) 100 MG tablet, Take 1 tablet by mouth Daily., Disp: 90 tablet, Rfl: 3  •  rosuvastatin (CRESTOR) 5 MG tablet, Take 1 tablet by mouth once daily, Disp: 90 tablet, Rfl: 0         Review of Systems   Constitutional: Negative for chills, diaphoresis and malaise/fatigue.   Eyes: Negative for visual disturbance.   Respiratory: Negative for shortness of breath.    Cardiovascular: Negative for chest pain and palpitations.   Gastrointestinal: Negative for abdominal pain and nausea.   Endocrine: Negative for polydipsia and polyphagia.   Musculoskeletal: Negative for neck stiffness.   Skin: Negative for color change and pallor.   Neurological: Negative for seizures and syncope.   Hematological: Negative for adenopathy.   Psychiatric/Behavioral: Negative for hallucinations and suicidal ideas.       I have reviewed and confirmed the accuracy of the ROS as documented by the MA/LPN/RN Monty Henry MD      Objective   /86 (BP Location: Right arm, Patient Position: Sitting, Cuff Size: Adult)   Pulse 76   Temp 97.7 °F (36.5 °C) (Temporal)   Resp 18   Ht 182.9 cm (72\")   Wt 79.8 kg (176 lb)   SpO2 99% Comment: room air  BMI 23.87 kg/m²   BP Readings from Last 3 Encounters:   07/12/22 134/86   05/10/22 158/98   01/18/22 (!) 140/102     Wt Readings from Last 3 Encounters:   07/12/22 79.8 kg (176 lb)   05/10/22 78.9 kg (174 lb)   01/18/22 75.8 kg (167 lb)     Physical Exam  Constitutional:       Appearance: Normal appearance. He is well-developed. He is not diaphoretic.   Cardiovascular:      Rate and Rhythm: Normal rate and regular rhythm.      Pulses: Normal pulses.      Heart sounds: Normal heart sounds, S1 normal " and S2 normal. No murmur heard.    No friction rub. No gallop.   Pulmonary:      Effort: Pulmonary effort is normal. No accessory muscle usage.      Breath sounds: Normal breath sounds. No stridor. No decreased breath sounds, wheezing, rhonchi or rales.   Abdominal:      General: Bowel sounds are normal. There is no distension.      Palpations: Abdomen is soft. Abdomen is not rigid. There is no mass or pulsatile mass.      Tenderness: There is no abdominal tenderness. There is no guarding or rebound. Negative signs include Castillo's sign.      Hernia: No hernia is present.   Skin:     General: Skin is warm and dry.      Coloration: Skin is not pale.   Neurological:      Mental Status: He is alert and oriented to person, place, and time.      Cranial Nerves: No cranial nerve deficit.      Sensory: No sensory deficit.      Motor: No tremor, abnormal muscle tone or seizure activity.      Coordination: Coordination normal.      Gait: Gait normal.      Deep Tendon Reflexes: Reflexes are normal and symmetric.         Data / Lab Results:    No results found for: HGBA1C     Lab Results   Component Value Date     (H) 09/13/2021     (H) 03/08/2021     (H) 09/01/2020     Lab Results   Component Value Date    CHOL 232 (H) 03/08/2019    CHOL 227 (H) 11/26/2018    CHOL 214 (H) 06/17/2016     Lab Results   Component Value Date    TRIG 61 09/13/2021    TRIG 91 03/08/2021    TRIG 76 09/01/2020     Lab Results   Component Value Date    HDL 44 09/13/2021    HDL 47 03/08/2021    HDL 47 09/01/2020     Lab Results   Component Value Date    PSA 1.3 09/13/2021    PSA 1.2 09/01/2020    PSA 1.0 11/26/2018     Lab Results   Component Value Date    WBC 4.50 12/07/2021    HGB 14.9 12/07/2021    HCT 42.2 12/07/2021    MCV 88.5 12/07/2021     12/07/2021     Lab Results   Component Value Date    TSH 3.750 12/06/2021      Lab Results   Component Value Date    GLUCOSE 99 12/07/2021    BUN 14 12/07/2021    CREATININE 0.75  (L) 12/07/2021    EGFRIFNONA 104 12/07/2021    EGFRIFAFRI 95 09/13/2021    BCR 18.7 12/07/2021    K 3.8 12/07/2021    CO2 23.0 12/07/2021    CALCIUM 8.7 12/07/2021    PROTENTOTREF 7.3 09/13/2021    ALBUMIN 4.50 12/06/2021    LABIL2 1.7 09/13/2021    AST 46 (H) 12/06/2021    ALT 29 12/06/2021     Lab Results   Component Value Date    SEDRATE 21 (H) 12/06/2021      Lab Results   Component Value Date    CRP 0.47 12/06/2021      No results found for: IRON, TIBC, FERRITIN   Lab Results   Component Value Date    IIFNDANT67 1,324 (H) 12/06/2021          Assessment & Plan      Medications        Problem List         LOS        Proximal muscle weakness.  Making steady improvement today. Status post benign MRI brain/C-spine/neurology eval inpatient.  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  Clinically improved today but having some persistent extremity numbness/weakness..  Rehabilitation exercises discussed.  Followed by neurology Dr. Wynn at , has had repeat work-up with brain imaging, LP this summer, neurology recommends that he does not take COVID-19 vaccine.   Followed by neurology Dr. Ross DDx includes Guillain-Barré versus COPD.  Has neurology eval scheduled at tertiary care center for Dr. Block.  Consider steroid Rx, PT if persistent symptoms  Health maintenance.  Doing well.  Recommend 23 valent Pneumovax he states he will consider.  Tolerating coated baby aspirin every other day.  Discussed health maintenance, screening test, lifestyle modification.  Hypertension.  Worse, home blood pressure results reviewed, increase atenolol to 100 mg every morning.  Home blood pressure monitor results reviewed.  Remote history of benign cardiac stress testing, recommend repeat stress echo, carotid Dopplers he states he will consider next year.    Palpitations.  History of, much improved on atenolol.  Limit caffeine.  Hyperlipidemia.  Much improved today tolerating Crestor 5 mg 3 days/week, LDL to 100.  Discussed diet  and exercise lifestyle to modification.  Discussed diet, exercise, lifestyle modification.   Direct inguinal hernia.  Small/nontender, asymptomatic currently.  Continue to monitor clinically.  Consider surgery referral if worsening symptoms.  Asymmetric abdominal fat.  He is concerned about a prominence of his abdominal wall which is a symmetric.  Benign exam today, asymptomatic.  Reassurance given.  Has had CT abdomen will get records.  Follow-up recheck.  Consider repeat imaging, surgery referral if worsening symptoms.  Colon screening.  Has had colonoscopy 2015, repeat was recommended in 10 years, will get the records.  Followed by [facility].  Screening for prostate cancer.  PSA benign.  Long discussion today, recommend COVID-19 vaccination he states he will consider.  Dysplastic nevus.  With severe atypia.  Right forearm, healing well status post resection, sutures removed today.  Margins clear but close, recommend further resection, dermatology referral scheduled.  Positive multiple nevi on exam, benign appearance today.  Sun protection discussed.  Follow-up for yearly skin exams.-Positive family history melanoma, father.  COVID-19 viral respiratory infection.  Improved/resolved currently, has completed quarantine.  Has not been vaccinated.        Diagnoses and all orders for this visit:    1. Essential hypertension (Primary)  -     atenolol (TENORMIN) 100 MG tablet; Take 1 tablet by mouth Daily.  Dispense: 90 tablet; Refill: 3    2. Benign prostatic hyperplasia with urinary frequency    3. Weakness    4. Multiple falls    5. CIDP (chronic inflammatory demyelinating polyneuropathy) ([facility])              Expected course, medications, and adverse effects discussed.  Call or return if worsening or persistent symptoms.  I wore protective equipment throughout this patient encounter including a mask, gloves, and eye protection.  Hand hygiene was performed before donning protective equipment and after removal when leaving the  room. The complete contents of the Assessment and Plan and Data/Lab Results as documented above have been reviewed and addressed by myself with the patient today as part of an ongoing evaluation / treatment plan.  If some of the documentation has been copied from a previous note and is unchanged it indicates that this problem / plan has been assessed today but is stable from a previous visit and no changes have been recommended.

## 2022-07-12 ENCOUNTER — OFFICE VISIT (OUTPATIENT)
Dept: FAMILY MEDICINE CLINIC | Facility: CLINIC | Age: 72
End: 2022-07-12

## 2022-07-12 VITALS
HEIGHT: 72 IN | WEIGHT: 176 LBS | DIASTOLIC BLOOD PRESSURE: 86 MMHG | OXYGEN SATURATION: 99 % | BODY MASS INDEX: 23.84 KG/M2 | RESPIRATION RATE: 18 BRPM | HEART RATE: 76 BPM | SYSTOLIC BLOOD PRESSURE: 134 MMHG | TEMPERATURE: 97.7 F

## 2022-07-12 DIAGNOSIS — R53.1 WEAKNESS: ICD-10-CM

## 2022-07-12 DIAGNOSIS — I10 ESSENTIAL HYPERTENSION: Primary | ICD-10-CM

## 2022-07-12 DIAGNOSIS — R35.0 BENIGN PROSTATIC HYPERPLASIA WITH URINARY FREQUENCY: ICD-10-CM

## 2022-07-12 DIAGNOSIS — N40.1 BENIGN PROSTATIC HYPERPLASIA WITH URINARY FREQUENCY: ICD-10-CM

## 2022-07-12 DIAGNOSIS — G61.81 CIDP (CHRONIC INFLAMMATORY DEMYELINATING POLYNEUROPATHY): ICD-10-CM

## 2022-07-12 DIAGNOSIS — R29.6 MULTIPLE FALLS: ICD-10-CM

## 2022-07-12 PROCEDURE — 99214 OFFICE O/P EST MOD 30 MIN: CPT | Performed by: FAMILY MEDICINE

## 2022-07-12 RX ORDER — ATENOLOL 100 MG/1
100 TABLET ORAL DAILY
Qty: 90 TABLET | Refills: 3 | Status: SHIPPED | OUTPATIENT
Start: 2022-07-12 | End: 2022-09-23

## 2022-07-31 PROBLEM — G61.81 CIDP (CHRONIC INFLAMMATORY DEMYELINATING POLYNEUROPATHY) (HCC): Status: ACTIVE | Noted: 2022-07-31

## 2022-09-20 PROBLEM — Z12.11 SCREENING FOR COLON CANCER: Status: ACTIVE | Noted: 2022-09-20

## 2022-09-20 NOTE — PROGRESS NOTES
Subjective   Emanuel Alcantara is a 71 y.o. male.     Chief Complaint   Patient presents with   • Medicare Wellness-subsequent   • Hypertension   • Hyperlipidemia       The patient is here: to discuss health maintenance and disease prevention.  Last comprehensive physical was on 9/21/21.  Previous physical was performed by  Monty Henry MD  Overall has: moderate activity with work/home activities, exercises 2 - 3 times per week, good appetite, feels well with minor complaints, decreased energy level and is sleeping well. Nutrition: appropriate balanced diet. Last tetanus shot was 5-10 years ago.   Patient's last PSA was: 1.3 on 9/14/21   Last Completed Colonoscopy          COLORECTAL CANCER SCREENING (COLONOSCOPY - Every 10 Years) Next due on 1/1/2025 01/01/2015  COLONOSCOPY (Done)    03/17/2005  SCANNED - COLONOSCOPY                Hypertension  This is a chronic problem. The current episode started more than 1 year ago. The problem is uncontrolled. Pertinent negatives include no chest pain, headaches, malaise/fatigue, palpitations or shortness of breath. (Emanuel states his blood pressure at home have been running high. He brought a list of readings.    His blood pressure in office was 162/90. ) Risk factors for coronary artery disease include dyslipidemia, male gender and family history. Current antihypertension treatment includes beta blockers. The current treatment provides moderate improvement.   Hyperlipidemia  This is a chronic problem. The current episode started more than 1 year ago. Pertinent negatives include no chest pain or shortness of breath. Risk factors for coronary artery disease include male sex and hypertension.        Recent Hospitalizations:  Recently treated at the following:  [facility] .  [facility]    I personally reviewed and updated the patient's allergies, medications, problem list, and past medical, surgical, social, and family history. I have reviewed and confirmed the accuracy  of the HPI and ROS as documented by the MA/LPN/RN Monty Henry MD      Family History   Problem Relation Age of Onset   • Hypertension Mother    • Osteoporosis Mother    • Glaucoma Mother    • Macular degeneration Mother    • Heart failure Father    • Hypertension Father    • Obesity Father    • Hypertension Sister    • Stomach cancer Maternal Grandfather    • Diabetes Paternal Grandmother    • Pancreatic cancer Paternal Grandmother    • Aneurysm Paternal Grandfather        Social History     Tobacco Use   • Smoking status: Never Smoker   • Smokeless tobacco: Never Used   Vaping Use   • Vaping Use: Never used   Substance Use Topics   • Alcohol use: Not Currently   • Drug use: Never       History reviewed. No pertinent surgical history.    Patient Active Problem List   Diagnosis   • Allergic rhinitis   • Benign prostatic hyperplasia   • Mixed hyperlipidemia   • Essential hypertension   • Osteoarthritis   • Prostatitis, chronic   • Encounter for screening for malignant neoplasm of prostate   • Balanitis   • Direct inguinal hernia   • Medicare annual wellness visit, subsequent   • Nevus   • Dysplastic nevus   • Bilateral leg weakness   • Bilateral arm weakness   • Paresthesia of foot, bilateral   • Paresthesia of hand, bilateral   • Ataxia   • Multiple falls   • COVID-19 virus infection   • Weakness   • Cytokine release syndrome, grade 1   • CIDP (chronic inflammatory demyelinating polyneuropathy) ([facility])   • Screening for colon cancer         Current Outpatient Medications:   •  aspirin 81 MG EC tablet, Take 81 mg by mouth. Every other day, Disp: , Rfl:   •  rosuvastatin (CRESTOR) 5 MG tablet, Take 1 tablet by mouth once daily, Disp: 90 tablet, Rfl: 0  •  metoprolol succinate XL (TOPROL-XL) 25 MG 24 hr tablet, Take 1 tablet by mouth Daily., Disp: 90 tablet, Rfl: 3         Review of Systems   Constitutional: Negative for chills, diaphoresis and malaise/fatigue.   Eyes: Negative for visual disturbance.   Respiratory:  "Negative for shortness of breath.    Cardiovascular: Negative for chest pain and palpitations.   Gastrointestinal: Negative for abdominal pain and nausea.   Endocrine: Negative for polydipsia and polyphagia.   Musculoskeletal: Negative for neck stiffness.   Skin: Negative for color change and pallor.   Neurological: Negative for seizures and syncope.   Hematological: Negative for adenopathy.   Psychiatric/Behavioral: Negative for hallucinations and suicidal ideas.       I have reviewed and confirmed the accuracy of the ROS as documented by the MA/LPN/RN Monty Henry MD      Objective   /90 (BP Location: Right arm, Patient Position: Sitting, Cuff Size: Adult)   Pulse 69   Temp 98.7 °F (37.1 °C)   Resp 16   Ht 182.9 cm (72\")   Wt 81.3 kg (179 lb 3.2 oz)   SpO2 100%   BMI 24.30 kg/m²   BP Readings from Last 3 Encounters:   09/23/22 162/90   07/12/22 134/86   05/10/22 158/98     Wt Readings from Last 3 Encounters:   09/23/22 81.3 kg (179 lb 3.2 oz)   07/12/22 79.8 kg (176 lb)   05/10/22 78.9 kg (174 lb)     Physical Exam  Constitutional:       Appearance: He is well-developed. He is not diaphoretic.   HENT:      Head: Normocephalic.      Right Ear: Tympanic membrane, ear canal and external ear normal.      Left Ear: Tympanic membrane, ear canal and external ear normal.      Nose: Nose normal.   Eyes:      General: Lids are normal.      Conjunctiva/sclera: Conjunctivae normal.      Pupils: Pupils are equal, round, and reactive to light.   Neck:      Thyroid: No thyromegaly.      Vascular: No carotid bruit or JVD.      Trachea: No tracheal deviation.   Cardiovascular:      Rate and Rhythm: Normal rate and regular rhythm.      Heart sounds: Normal heart sounds. No murmur heard.    No friction rub. No gallop.   Pulmonary:      Effort: Pulmonary effort is normal.      Breath sounds: Normal breath sounds. No stridor. No decreased breath sounds, wheezing or rales.   Abdominal:      General: Bowel sounds are " normal. There is no distension.      Palpations: Abdomen is soft. There is no mass.      Tenderness: There is no abdominal tenderness. There is no guarding or rebound.      Hernia: No hernia is present.   Lymphadenopathy:      Head:      Right side of head: No submental, submandibular, tonsillar, preauricular, posterior auricular or occipital adenopathy.      Left side of head: No submental, submandibular, tonsillar, preauricular, posterior auricular or occipital adenopathy.      Cervical: No cervical adenopathy.   Skin:     General: Skin is warm and dry.      Coloration: Skin is not pale.   Neurological:      Mental Status: He is alert and oriented to person, place, and time.      Cranial Nerves: No cranial nerve deficit.      Sensory: No sensory deficit.      Coordination: Coordination normal.      Gait: Gait normal.      Deep Tendon Reflexes: Reflexes are normal and symmetric.         Data / Lab Results:    No results found for: HGBA1C     Lab Results   Component Value Date     (H) 09/14/2022     (H) 09/13/2021     (H) 03/08/2021     Lab Results   Component Value Date    CHOL 232 (H) 03/08/2019    CHOL 227 (H) 11/26/2018    CHOL 214 (H) 06/17/2016     Lab Results   Component Value Date    TRIG 99 09/14/2022    TRIG 61 09/13/2021    TRIG 91 03/08/2021     Lab Results   Component Value Date    HDL 47 09/14/2022    HDL 44 09/13/2021    HDL 47 03/08/2021     Lab Results   Component Value Date    PSA 1.3 09/14/2022    PSA 1.3 09/13/2021    PSA 1.2 09/01/2020     Lab Results   Component Value Date    WBC 7.0 09/14/2022    HGB 15.2 09/14/2022    HCT 46.1 09/14/2022    MCV 92 09/14/2022     09/14/2022     Lab Results   Component Value Date    TSH 4.330 09/14/2022      Lab Results   Component Value Date    GLUCOSE 96 09/14/2022    BUN 13 09/14/2022    CREATININE 0.99 09/14/2022    EGFRIFNONA 104 12/07/2021    EGFRIFAFRI 95 09/13/2021    BCR 13 09/14/2022    K 4.2 09/14/2022    CO2 21 09/14/2022     CALCIUM 10.0 09/14/2022    PROTENTOTREF 7.7 09/14/2022    ALBUMIN 4.6 09/14/2022    LABIL2 1.5 09/14/2022    AST 29 09/14/2022    ALT 26 09/14/2022     Lab Results   Component Value Date    SEDRATE 21 (H) 12/06/2021      Lab Results   Component Value Date    CRP 0.47 12/06/2021      No results found for: IRON, TIBC, FERRITIN   Lab Results   Component Value Date    LOYZIQJF74 1,324 (H) 12/06/2021          Age-appropriate Screening Schedule:  Refer to the list below for future screening recommendations based on patient's age, sex and/or medical conditions. Orders for these recommended tests are listed in the plan section. The patient has been provided with a written plan.    Health Maintenance   Topic Date Due   • ZOSTER VACCINE (1 of 2) Never done   • INFLUENZA VACCINE  10/01/2022   • LIPID PANEL  09/14/2023   • TDAP/TD VACCINES (3 - Td or Tdap) 10/27/2026       Depression Screen:   PHQ-2/PHQ-9 Depression Screening 9/23/2022   Retired PHQ-9 Total Score -   Retired Total Score -   Little Interest or Pleasure in Doing Things 0-->not at all   Feeling Down, Depressed or Hopeless 0-->not at all   PHQ-9: Brief Depression Severity Measure Score 0     I spent more than 16 minutes asking patient questions, counseling and documenting in the chart.    Health Habits and Functional and Cognitive Screening:  Functional & Cognitive Status 9/23/2022   Do you have difficulty preparing food and eating? No   Do you have difficulty bathing yourself, getting dressed or grooming yourself? No   Do you have difficulty using the toilet? No   Do you have difficulty moving around from place to place? No   Do you have trouble with steps or getting out of a bed or a chair? No   Current Diet Well Balanced Diet   Dental Exam Up to date   Eye Exam Up to date   Exercise (times per week) 4 times per week   Current Exercises Include Walking;Yard Work   Current Exercise Activities Include -   Do you need help using the phone?  No   Are you deaf or  do you have serious difficulty hearing?  No   Do you need help with transportation? No   Do you need help shopping? No   Do you need help preparing meals?  No   Do you need help with housework?  No   Do you need help with laundry? No   Do you need help taking your medications? No   Do you need help managing money? No   Do you ever drive or ride in a car without wearing a seat belt? No   Have you felt unusual stress, anger or loneliness in the last month? No   Who do you live with? Spouse   If you need help, do you have trouble finding someone available to you? No   Have you been bothered in the last four weeks by sexual problems? -   Do you have difficulty concentrating, remembering or making decisions? No       Does the patient have evidence of cognitive impairment? No    Advance Care Planning:  ACP discussion was held with the patient during this visit. Patient has an advance directive in EMR which is still valid.      A face-to-face visit was completed today with patient.  Counseling explanation, and discussion of advanced directives was performed.   The last advanced care visit was performed in 2021.  In a near life ending situation, from which he is not expected to recover functionally, and he is not able to speak for him, he does not want life sustaining measures. We discussed feeding tubes, ventilators and cardiac support as well as dialysis.    I spent more than 16 minutes discussing Advanced Care Planning information and documenting in the chart.    Identification of Risk Factors:  Risk factors include: Advance Directive Discussion  Colon Cancer Screening  Dementia/Memory   Fall Risk  Immunizations Discussed/Encouraged (specific immunizations; Influenza )  Prostate Cancer Screening .    Compared to one year ago, the patient feels his physical health is the same.  Compared to one year ago, the patient feels his mental health is the same.    Patient Self-Management and Personalized Health Advice  The patient  has been provided with information about: diet, exercise, weight management, fall prevention, designing advance directives and supplements and preventive services including:   · Annual Wellness Visit (AWV)  · Cardiovascular Disease Screening Tests (may do this order every 5 years in beneficiaries without signs or symptoms of cardiovascular disease)  · Colorectal Cancer Screening, Fecal Occult Blood Test  · Depression Screening (15 minutes face to face, Code )  · Diabetes Screening-Lab Order for either glucose quantitative blood (except reagent strip), glucose;post glucose dose(includes glucose), or glucose tolerance test-3 specimens(includes glucose).      Assessment & Plan      Medications        Problem List         LOS      Physical.  Doing well.  Neurology recommendations holding All vaccinations.  Tolerating coated baby aspirin every other day.  Discussed health maintenance, screening test, lifestyle modification.  Hypertension.  Worse, home blood pressure results reviewed, worsening morning, stop atenolol, start Toprol-XL.  Continu monitor blood pressure, follow-up in 6-8 weeks.  Remote history of benign cardiac stress testing, recommend repeat stress echo, carotid Dopplers, AAA ultrasound, plan scheduled blood pressure better controlled.    Palpitations.  History of, much improved on atenolol, changed to Toprol..  Limit caffeine.  Hyperlipidemia.  Worse today, LDL to 132, increase Crestor to 4 days/week.  Discussed diet and exercise lifestyle to modification.  Discussed diet, exercise, lifestyle modification.   Direct inguinal hernia.  Small/nontender, asymptomatic currently.  Continue to monitor clinically.  Consider surgery referral if worsening symptoms.  Asymmetric abdominal fat.  He is worse today, LDL back to 132, concerned about a prominence of his abdominal wall which is a symmetric.  Benign exam today, asymptomatic.  Reassurance given.  Has had CT abdomen will get records.  Follow-up recheck.   Consider repeat imaging, surgery referral if worsening symptoms.  Colon screening.  Has had colonoscopy 2015, repeat was recommended in 10 years, will get the records.  Followed by [facility].  Screening for prostate cancer.  PSA benign.  Dysplastic nevus.  With severe atypia.  Right forearm, healing well status post resection, sutures removed today.  Margins clear but close, recommend further resection, dermatology referral scheduled.  Positive multiple nevi on exam, benign appearance today.  Sun protection discussed.  Follow-up for yearly skin exams.-Positive family history melanoma, father.  COVID-19 viral respiratory infection.  Improved/resolved currently, has completed quarantine.  Has not been vaccinated.  Proximal muscle weakness.  Making steady improvement today. Status post benign MRI brain/C-spine/neurology eval inpatient.  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  Clinically improved today but having some persistent extremity numbness/weakness..  Rehabilitation exercises discussed.  Followed by neurology Dr. Wynn at , has had repeat work-up with brain imaging, LP this summer, neurology recommends that he does not take COVID-19 vaccine.  Followed by neurology Dr. Ross DDx includes Guillain-Barré versus COPD.  Has neurology eval scheduled at tertiary [facility] center for Dr. Block.  Consider steroid Rx, PT if persistent symptoms      Diagnoses and all orders for this visit:    1. Medicare annual wellness visit, subsequent (Primary)    2. Essential hypertension  -     metoprolol succinate XL (TOPROL-XL) 25 MG 24 hr tablet; Take 1 tablet by mouth Daily.  Dispense: 90 tablet; Refill: 3    3. Mixed hyperlipidemia    4. Encounter for screening for malignant neoplasm of prostate    5. Screening for colon cancer    6. CIDP (chronic inflammatory demyelinating polyneuropathy) (HCC)              Expected course, medications, and adverse effects discussed.  Call or return if worsening or persistent symptoms.  I  wore protective equipment throughout this patient encounter including a mask, gloves, and eye protection.  Hand hygiene was performed before donning protective equipment and after removal when leaving the room. The complete contents of the Assessment and Plan and Data / Lab Results as documented above have been reviewed and addressed by myself with the patient today as part of an ongoing evaluation / treatment plan.  If some of the documentation has been copied from a previous note and is unchanged it indicates that this problem / plan has been assessed today but is stable from a previous visit and no changes have been recommended.The separate E/M service provided today is significant, medically necessary, and separately identifiable.

## 2022-09-23 ENCOUNTER — OFFICE VISIT (OUTPATIENT)
Dept: FAMILY MEDICINE CLINIC | Facility: CLINIC | Age: 72
End: 2022-09-23

## 2022-09-23 VITALS
BODY MASS INDEX: 24.27 KG/M2 | OXYGEN SATURATION: 100 % | DIASTOLIC BLOOD PRESSURE: 90 MMHG | TEMPERATURE: 98.7 F | RESPIRATION RATE: 16 BRPM | HEIGHT: 72 IN | HEART RATE: 69 BPM | SYSTOLIC BLOOD PRESSURE: 162 MMHG | WEIGHT: 179.2 LBS

## 2022-09-23 DIAGNOSIS — Z12.11 SCREENING FOR COLON CANCER: ICD-10-CM

## 2022-09-23 DIAGNOSIS — I10 ESSENTIAL HYPERTENSION: ICD-10-CM

## 2022-09-23 DIAGNOSIS — E78.2 MIXED HYPERLIPIDEMIA: ICD-10-CM

## 2022-09-23 DIAGNOSIS — G61.81 CIDP (CHRONIC INFLAMMATORY DEMYELINATING POLYNEUROPATHY): ICD-10-CM

## 2022-09-23 DIAGNOSIS — Z00.00 MEDICARE ANNUAL WELLNESS VISIT, SUBSEQUENT: Primary | ICD-10-CM

## 2022-09-23 DIAGNOSIS — Z12.5 ENCOUNTER FOR SCREENING FOR MALIGNANT NEOPLASM OF PROSTATE: ICD-10-CM

## 2022-09-23 PROCEDURE — 99497 ADVNCD CARE PLAN 30 MIN: CPT | Performed by: FAMILY MEDICINE

## 2022-09-23 PROCEDURE — 3725F SCREEN DEPRESSION PERFORMED: CPT | Performed by: FAMILY MEDICINE

## 2022-09-23 PROCEDURE — G0439 PPPS, SUBSEQ VISIT: HCPCS | Performed by: FAMILY MEDICINE

## 2022-09-23 PROCEDURE — 99214 OFFICE O/P EST MOD 30 MIN: CPT | Performed by: FAMILY MEDICINE

## 2022-09-23 PROCEDURE — 1170F FXNL STATUS ASSESSED: CPT | Performed by: FAMILY MEDICINE

## 2022-09-23 PROCEDURE — 1159F MED LIST DOCD IN RCRD: CPT | Performed by: FAMILY MEDICINE

## 2022-09-23 RX ORDER — ASPIRIN 81 MG/1
81 TABLET ORAL
COMMUNITY

## 2022-09-23 RX ORDER — METOPROLOL SUCCINATE 25 MG/1
25 TABLET, EXTENDED RELEASE ORAL DAILY
Qty: 90 TABLET | Refills: 3 | Status: SHIPPED | OUTPATIENT
Start: 2022-09-23 | End: 2022-10-18 | Stop reason: SDUPTHER

## 2022-10-12 ENCOUNTER — TELEPHONE (OUTPATIENT)
Dept: FAMILY MEDICINE CLINIC | Facility: CLINIC | Age: 72
End: 2022-10-12

## 2022-10-12 NOTE — TELEPHONE ENCOUNTER
Patient states metoprolol 25mg is not doing as well for him as the atenilol 100mg that he was taking previously. States blood pressure is not better & his heart rate was better with the atenilol. He still has atenilol 100mg at home & would like permission to take them instead. Please advise.

## 2022-10-14 NOTE — TELEPHONE ENCOUNTER
Lets get him set up for a telephone visit early next week so can go over his blood pressures and heart rates and get a plan for him, we may need to go up on metoprolol if is not doing the job, the atenolol was not controlling his blood pressure even at the highest dose, thanks

## 2022-10-17 NOTE — PROGRESS NOTES
Subjective   Emanuel Alcantara is a 71 y.o. male.     Chief Complaint   Patient presents with   • Hypertension   • Hyperlipidemia       Hypertension  This is a chronic problem. The current episode started more than 1 year ago. The problem is uncontrolled. Pertinent negatives include no chest pain, headaches, malaise/fatigue, palpitations or shortness of breath. (Emanuel states his blood pressure at home have been running high. He brought a list of readings.    His blood pressure in office was 162/90. ) Risk factors for coronary artery disease include dyslipidemia, male gender and family history. Current antihypertension treatment includes beta blockers. The current treatment provides moderate improvement.   Hyperlipidemia  This is a chronic problem. The current episode started more than 1 year ago. Pertinent negatives include no chest pain or shortness of breath. Risk factors for coronary artery disease include male sex and hypertension.            I personally reviewed and updated the patient's allergies, medications, problem list, and past medical, surgical, social, and family history. I have reviewed and confirmed the accuracy of the History of Present Illness and Review of Symptoms as documented by the MA/LPN/RN. Monty Henry MD    Family History   Problem Relation Age of Onset   • Hypertension Mother    • Osteoporosis Mother    • Glaucoma Mother    • Macular degeneration Mother    • Heart failure Father    • Hypertension Father    • Obesity Father    • Hypertension Sister    • Stomach cancer Maternal Grandfather    • Diabetes Paternal Grandmother    • Pancreatic cancer Paternal Grandmother    • Aneurysm Paternal Grandfather        Social History     Tobacco Use   • Smoking status: Never   • Smokeless tobacco: Never   Vaping Use   • Vaping Use: Never used   Substance Use Topics   • Alcohol use: Not Currently   • Drug use: Never       No past surgical history on file.    Patient Active Problem List   Diagnosis   •  "Allergic rhinitis   • Benign prostatic hyperplasia   • Mixed hyperlipidemia   • Essential hypertension   • Osteoarthritis   • Prostatitis, chronic   • Encounter for screening for malignant neoplasm of prostate   • Balanitis   • Direct inguinal hernia   • Medicare annual wellness visit, subsequent   • Nevus   • Dysplastic nevus   • Bilateral leg weakness   • Bilateral arm weakness   • Paresthesia of foot, bilateral   • Paresthesia of hand, bilateral   • Ataxia   • Multiple falls   • COVID-19 virus infection   • Weakness   • Cytokine release syndrome, grade 1   • CIDP (chronic inflammatory demyelinating polyneuropathy) ([facility])   • Screening for colon cancer         Current Outpatient Medications:   •  aspirin 81 MG EC tablet, Take 81 mg by mouth. Every other day, Disp: , Rfl:   •  metoprolol succinate XL (TOPROL-XL) 50 MG 24 hr tablet, Take 1 tablet by mouth Daily., Disp: 30 tablet, Rfl: 0  •  rosuvastatin (CRESTOR) 5 MG tablet, Take 1 tablet by mouth once daily, Disp: 90 tablet, Rfl: 0         Review of Systems   Constitutional: Negative for chills, diaphoresis and malaise/fatigue.   Eyes: Negative for visual disturbance.   Respiratory: Negative for shortness of breath.    Cardiovascular: Negative for chest pain and palpitations.   Gastrointestinal: Negative for abdominal pain and nausea.   Endocrine: Negative for polydipsia and polyphagia.   Musculoskeletal: Negative for neck stiffness.   Skin: Negative for color change and pallor.   Neurological: Negative for seizures and syncope.   Hematological: Negative for adenopathy.   Psychiatric/Behavioral: Negative for hallucinations and suicidal ideas.       I have reviewed and confirmed the accuracy of the ROS as documented by the MA/LPN/RN Monty Henry MD      Objective   Ht 182.9 cm (72\")   Wt 81.2 kg (179 lb)   BMI 24.28 kg/m²   BP Readings from Last 3 Encounters:   09/23/22 162/90   07/12/22 134/86   05/10/22 158/98     Wt Readings from Last 3 Encounters:   10/18/22 " 81.2 kg (179 lb)   09/23/22 81.3 kg (179 lb 3.2 oz)   07/12/22 79.8 kg (176 lb)     Physical Exam    Data / Lab Results:    No results found for: HGBA1C     Lab Results   Component Value Date     (H) 09/14/2022     (H) 09/13/2021     (H) 03/08/2021     Lab Results   Component Value Date    CHOL 232 (H) 03/08/2019    CHOL 227 (H) 11/26/2018    CHOL 214 (H) 06/17/2016     Lab Results   Component Value Date    TRIG 99 09/14/2022    TRIG 61 09/13/2021    TRIG 91 03/08/2021     Lab Results   Component Value Date    HDL 47 09/14/2022    HDL 44 09/13/2021    HDL 47 03/08/2021     Lab Results   Component Value Date    PSA 1.3 09/14/2022    PSA 1.3 09/13/2021    PSA 1.2 09/01/2020     Lab Results   Component Value Date    WBC 7.0 09/14/2022    HGB 15.2 09/14/2022    HCT 46.1 09/14/2022    MCV 92 09/14/2022     09/14/2022     Lab Results   Component Value Date    TSH 4.330 09/14/2022      Lab Results   Component Value Date    GLUCOSE 96 09/14/2022    BUN 13 09/14/2022    CREATININE 0.99 09/14/2022    EGFRIFNONA 104 12/07/2021    EGFRIFAFRI 95 09/13/2021    BCR 13 09/14/2022    K 4.2 09/14/2022    CO2 21 09/14/2022    CALCIUM 10.0 09/14/2022    PROTENTOTREF 7.7 09/14/2022    ALBUMIN 4.6 09/14/2022    LABIL2 1.5 09/14/2022    AST 29 09/14/2022    ALT 26 09/14/2022     Lab Results   Component Value Date    SEDRATE 21 (H) 12/06/2021      Lab Results   Component Value Date    CRP 0.47 12/06/2021      No results found for: IRON, TIBC, FERRITIN   Lab Results   Component Value Date    [id number] 1,324 (H) 12/06/2021        Emanuel Alcantara consented to undergoing telephone visit today.  This format was necessitated by the current COVID-19 viral pandemic.  During the encounter I was located in the office, Emanuel Alcantara was located in their personal residence.  23 minutes was spent with phone with Emanuel discussing his acute concerns and chronic medical problems.      Assessment & Plan      Medications         Problem List         LOS  Health maintenance.  Doing well.  Neurology recommendations holding All vaccinations.  Tolerating coated baby aspirin every other day.  Discussed health maintenance, screening test, lifestyle modification.  Hypertension.  Worse, home blood pressure results reviewed, home blood pressure monitor results reviewed, tolerating Toprol-XL, increase dose to 50 once daily. Continu monitor blood pressure, follow-up in 6-8 weeks.  Remote history of benign cardiac stress testing, recommend repeat stress echo, carotid Dopplers, AAA ultrasound, plan scheduled blood pressure better controlled.    Palpitations.  History of, much improved on atenolol, changed to Toprol..  Limit caffeine.  Hyperlipidemia.  Worse today, LDL to 132, increase Crestor to 4 days/week.  Discussed diet and exercise lifestyle to modification.  Discussed diet, exercise, lifestyle modification.   Direct inguinal hernia.  Small/nontender, asymptomatic currently.  Continue to monitor clinically.  Consider surgery referral if worsening symptoms.  Asymmetric abdominal fat.  He is worse today, LDL back to 132, concerned about a prominence of his abdominal wall which is a symmetric.  Benign exam today, asymptomatic.  Reassurance given.  Has had CT abdomen will get records.  Follow-up recheck.  Consider repeat imaging, surgery referral if worsening symptoms.  Colon screening.  Has had colonoscopy 2015, repeat was recommended in 10 years, will get the records.  Followed by I.  Screening for prostate cancer.  PSA benign.  Dysplastic nevus.  With severe atypia.  Right forearm, healing well status post resection, sutures removed today.  Margins clear but close, recommend further resection, dermatology referral scheduled.  Positive multiple nevi on exam, benign appearance today.  Sun protection discussed.  Follow-up for yearly skin exams.-Positive family history melanoma, father.  COVID-19 viral respiratory infection.  Improved/resolved  currently, has completed quarantine.  Has not been vaccinated.  Proximal muscle weakness.  Making steady improvement today. Status post benign MRI brain/C-spine/neurology eval inpatient.  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  Clinically improved today but having some persistent extremity numbness/weakness..  Rehabilitation exercises discussed.  Followed by neurology Dr. Wynn at , has had repeat work-up with brain imaging, LP this summer, neurology recommends that he does not take COVID-19 vaccine.  Followed by neurology Dr. Ross DDx includes Guillain-Barré versus COPD.  Has neurology eval scheduled at [facility] for Dr. Block.  Consider steroid Rx, PT if persistent symptoms           Problem List Items Addressed This Visit        Unprioritized    CIDP (chronic inflammatory demyelinating polyneuropathy) (HCC)    Essential hypertension - Primary    Relevant Medications    metoprolol succinate XL (TOPROL-XL) 50 MG 24 hr tablet    Mixed hyperlipidemia           Expected course, medications, and adverse effects discussed.  Call or return if worsening or persistent symptoms.  The complete contents of the Assessment and Plan and Data / Lab Results as documented above have been reviewed and addressed by myself with the patient today as part of an ongoing evaluation / treatment plan.  If some of the documentation has been copied from a previous note and is unchanged it indicates that this problem / plan has been assessed today but is stable from a previous visit and no changes have been recommended.

## 2022-10-18 ENCOUNTER — OFFICE VISIT (OUTPATIENT)
Dept: FAMILY MEDICINE CLINIC | Facility: CLINIC | Age: 72
End: 2022-10-18

## 2022-10-18 ENCOUNTER — TELEPHONE (OUTPATIENT)
Dept: FAMILY MEDICINE CLINIC | Facility: CLINIC | Age: 72
End: 2022-10-18

## 2022-10-18 VITALS — WEIGHT: 179 LBS | BODY MASS INDEX: 24.24 KG/M2 | HEIGHT: 72 IN

## 2022-10-18 DIAGNOSIS — G61.81 CIDP (CHRONIC INFLAMMATORY DEMYELINATING POLYNEUROPATHY): ICD-10-CM

## 2022-10-18 DIAGNOSIS — I10 ESSENTIAL HYPERTENSION: Primary | ICD-10-CM

## 2022-10-18 DIAGNOSIS — E78.2 MIXED HYPERLIPIDEMIA: ICD-10-CM

## 2022-10-18 PROCEDURE — 99443 PR PHYS/QHP TELEPHONE EVALUATION 21-30 MIN: CPT | Performed by: FAMILY MEDICINE

## 2022-10-18 RX ORDER — METOPROLOL SUCCINATE 50 MG/1
50 TABLET, EXTENDED RELEASE ORAL DAILY
Qty: 30 TABLET | Refills: 0 | Status: SHIPPED | OUTPATIENT
Start: 2022-10-18 | End: 2022-11-04 | Stop reason: SDUPTHER

## 2022-11-01 NOTE — PROGRESS NOTES
Subjective   Emanuel Alcantara is a 71 y.o. male.     Chief Complaint   Patient presents with   • Hypertension   • Hyperlipidemia       Hypertension  This is a chronic problem. The current episode started more than 1 year ago. The problem is uncontrolled. Associated symptoms include anxiety. Pertinent negatives include no chest pain, headaches, malaise/fatigue, palpitations or shortness of breath. Risk factors for coronary artery disease include dyslipidemia, male gender and family history. Current antihypertension treatment includes beta blockers. The current treatment provides moderate improvement.   Hyperlipidemia  This is a chronic problem. The current episode started more than 1 year ago. Associated symptoms include leg pain. Pertinent negatives include no chest pain or shortness of breath. Risk factors for coronary artery disease include male sex and hypertension.            I personally reviewed and updated the patient's allergies, medications, problem list, and past medical, surgical, social, and family history. I have reviewed and confirmed the accuracy of the History of Present Illness and Review of Symptoms as documented by the MA/LPN/RN. Monty Henry MD    Family History   Problem Relation Age of Onset   • Hypertension Mother    • Osteoporosis Mother    • Glaucoma Mother    • Macular degeneration Mother    • Heart failure Father    • Hypertension Father    • Obesity Father    • Hypertension Sister    • Stomach cancer Maternal Grandfather    • Diabetes Paternal Grandmother    • Pancreatic cancer Paternal Grandmother    • Aneurysm Paternal Grandfather        Social History     Tobacco Use   • Smoking status: Never   • Smokeless tobacco: Never   Vaping Use   • Vaping Use: Never used   Substance Use Topics   • Alcohol use: Not Currently   • Drug use: Never       No past surgical history on file.    Patient Active Problem List   Diagnosis   • Allergic rhinitis   • Benign prostatic hyperplasia   • Mixed  "hyperlipidemia   • Essential hypertension   • Osteoarthritis   • Prostatitis, chronic   • Encounter for screening for malignant neoplasm of prostate   • Balanitis   • Direct inguinal hernia   • Medicare annual wellness visit, subsequent   • Nevus   • Dysplastic nevus   • Bilateral leg weakness   • Bilateral arm weakness   • Paresthesia of foot, bilateral   • Paresthesia of hand, bilateral   • Ataxia   • Multiple falls   • COVID-19 virus infection   • Weakness   • Cytokine release syndrome, grade 1   • CIDP (chronic inflammatory demyelinating polyneuropathy) (HCC)   • Screening for colon cancer         Current Outpatient Medications:   •  aspirin 81 MG EC tablet, Take 81 mg by mouth. Every other day, Disp: , Rfl:   •  metoprolol succinate XL (TOPROL-XL) 50 MG 24 hr tablet, Take 1 tablet by mouth Daily., Disp: 90 tablet, Rfl: 3  •  rosuvastatin (CRESTOR) 5 MG tablet, Take 1 tablet by mouth Daily., Disp: 90 tablet, Rfl: 3         Review of Systems   Constitutional: Negative for chills, diaphoresis and malaise/fatigue.   Eyes: Negative for visual disturbance.   Respiratory: Negative for shortness of breath.    Cardiovascular: Negative for chest pain and palpitations.   Gastrointestinal: Negative for abdominal pain and nausea.   Endocrine: Negative for polydipsia and polyphagia.   Musculoskeletal: Negative for neck stiffness.   Skin: Negative for color change and pallor.   Neurological: Negative for seizures and syncope.   Hematological: Negative for adenopathy.       I have reviewed and confirmed the accuracy of the ROS as documented by the MA/LPN/RN Monty Henry MD      Objective   /96   Pulse 106   Temp 97.8 °F (36.6 °C)   Resp 14   Ht 182.9 cm (72\")   Wt 80.3 kg (177 lb)   SpO2 99%   BMI 24.01 kg/m²   BP Readings from Last 3 Encounters:   11/04/22 170/96   09/23/22 162/90   07/12/22 134/86     Wt Readings from Last 3 Encounters:   11/04/22 80.3 kg (177 lb)   10/18/22 81.2 kg (179 lb)   09/23/22 81.3 kg " (179 lb 3.2 oz)     Physical Exam  Constitutional:       Appearance: Normal appearance. He is well-developed. He is not diaphoretic.   Cardiovascular:      Rate and Rhythm: Normal rate and regular rhythm.      Pulses: Normal pulses.      Heart sounds: Normal heart sounds, S1 normal and S2 normal. No murmur heard.    No friction rub. No gallop.   Pulmonary:      Effort: Pulmonary effort is normal. No accessory muscle usage.      Breath sounds: Normal breath sounds. No stridor. No decreased breath sounds, wheezing, rhonchi or rales.   Abdominal:      General: Bowel sounds are normal. There is no distension.      Palpations: Abdomen is soft. Abdomen is not rigid. There is no mass or pulsatile mass.      Tenderness: There is no abdominal tenderness. There is no guarding or rebound. Negative signs include Castillo's sign.      Hernia: No hernia is present.   Skin:     General: Skin is warm and dry.      Coloration: Skin is not pale.   Neurological:      Mental Status: He is alert and oriented to person, place, and time.      Cranial Nerves: No cranial nerve deficit.      Sensory: No sensory deficit.      Motor: No tremor, abnormal muscle tone or seizure activity.      Coordination: Coordination normal.      Gait: Gait normal.      Deep Tendon Reflexes: Reflexes are normal and symmetric.         Data / Lab Results:    No results found for: HGBA1C     Lab Results   Component Value Date     (H) 09/14/2022     (H) 09/13/2021     (H) 03/08/2021     Lab Results   Component Value Date    CHOL 232 (H) 03/08/2019    CHOL 227 (H) 11/26/2018    CHOL 214 (H) 06/17/2016     Lab Results   Component Value Date    TRIG 99 09/14/2022    TRIG 61 09/13/2021    TRIG 91 03/08/2021     Lab Results   Component Value Date    HDL 47 09/14/2022    HDL 44 09/13/2021    HDL 47 03/08/2021     Lab Results   Component Value Date    PSA 1.3 09/14/2022    PSA 1.3 09/13/2021    PSA 1.2 09/01/2020     Lab Results   Component Value Date     WBC 7.0 09/14/2022    HGB 15.2 09/14/2022    HCT 46.1 09/14/2022    MCV 92 09/14/2022     09/14/2022     Lab Results   Component Value Date    TSH 4.330 09/14/2022      Lab Results   Component Value Date    GLUCOSE 96 09/14/2022    BUN 13 09/14/2022    CREATININE 0.99 09/14/2022    EGFRIFNONA 104 12/07/2021    EGFRIFAFRI 95 09/13/2021    BCR 13 09/14/2022    K 4.2 09/14/2022    CO2 21 09/14/2022    CALCIUM 10.0 09/14/2022    PROTENTOTREF 7.7 09/14/2022    ALBUMIN 4.6 09/14/2022    LABIL2 1.5 09/14/2022    AST 29 09/14/2022    ALT 26 09/14/2022     Lab Results   Component Value Date    SEDRATE 21 (H) 12/06/2021      Lab Results   Component Value Date    CRP 0.47 12/06/2021      No results found for: IRON, TIBC, FERRITIN   Lab Results   Component Value Date    [id number] 1,324 (H) 12/06/2021          Assessment & Plan      Medications        Problem List         LOS    Health maintenance.  Doing well.  Neurology recommendations holding All vaccinations.  Tolerating coated baby aspirin every other day.  Discussed health maintenance, screening test, lifestyle modification.  Hypertension.  Improved today on metoprolol XL 50 mg daily, home blood pressure results reviewed, home blood pressure monitor results reviewed, tolerating Toprol-XL, Continu monitor blood pressure, follow-up in 6-8 weeks.  Remote history of benign cardiac stress testing, recommend repeat stress echo, carotid Dopplers, AAA ultrasound, plan scheduled blood pressure better controlled.    Palpitations.  History of, much improved on atenolol, changed to Toprol..  Limit caffeine.  Hyperlipidemia.  Worse today, LDL to 132, increase Crestor to 4 days/week.  Discussed diet and exercise lifestyle to modification.  Discussed diet, exercise, lifestyle modification.   Direct inguinal hernia.  Small/nontender, asymptomatic currently.  Continue to monitor clinically.  Consider surgery referral if worsening symptoms.  Asymmetric abdominal fat.  He is worse  today, LDL back to 132, concerned about a prominence of his abdominal wall which is a symmetric.  Benign exam today, asymptomatic.  Reassurance given.  Has had CT abdomen will get records.  Follow-up recheck.  Consider repeat imaging, surgery referral if worsening symptoms.  Colon screening.  Has had colonoscopy 2015, repeat was recommended in 10 years, will get the records.  Followed by [facility].  Screening for prostate cancer.  PSA benign.  Dysplastic nevus.  With severe atypia.  Right forearm, healing well status post resection, sutures removed today.  Margins clear but close, recommend further resection, dermatology referral scheduled.  Positive multiple nevi on exam, benign appearance today.  Sun protection discussed.  Follow-up for yearly skin exams.-Positive family history melanoma, father.  COVID-19 viral respiratory infection.  Improved/resolved currently, has completed quarantine.  Has not been vaccinated.  Proximal muscle weakness.  Has been making steady improvement, has had some recurrent symptoms over the last 10 days, he agrees to contact his neurologist in the next 7 to 10 days if the symptoms persist.  Status post benign MRI brain/C-spine/neurology eval inpatient.  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  Clinically improved today but having some persistent extremity numbness/weakness..  Rehabilitation exercises discussed.  Followed by neurology Dr. Wynn at , has had repeat work-up with brain imaging, LP this summer, neurology recommends that he does not take COVID-19 vaccine.  Followed by neurology Dr. Ross DDx includes Guillain-Barré versus COPD.  Has neurology eval scheduled at tertiary [facility] center for Dr. Block.  Consider steroid Rx, PT if persistent symptoms        Diagnoses and all orders for this visit:    1. Essential hypertension (Primary)  -     Discontinue: metoprolol succinate XL (TOPROL-XL) 50 MG 24 hr tablet; Take 1 tablet by mouth Daily.  Dispense: 90 tablet; Refill: 3  -      metoprolol succinate XL (TOPROL-XL) 50 MG 24 hr tablet; Take 1 tablet by mouth Daily.  Dispense: 90 tablet; Refill: 3    2. Mixed hyperlipidemia  -     rosuvastatin (CRESTOR) 5 MG tablet; Take 1 tablet by mouth Daily.  Dispense: 90 tablet; Refill: 3    3. CIDP (chronic inflammatory demyelinating polyneuropathy) ([facility])              Expected course, medications, and adverse effects discussed.  Call or return if worsening or persistent symptoms.  I wore protective equipment throughout this patient encounter including a mask, gloves, and eye protection.  Hand hygiene was performed before donning protective equipment and after removal when leaving the room. The complete contents of the Assessment and Plan and Data/Lab Results as documented above have been reviewed and addressed by myself with the patient today as part of an ongoing evaluation / treatment plan.  If some of the documentation has been copied from a previous note and is unchanged it indicates that this problem / plan has been assessed today but is stable from a previous visit and no changes have been recommended.

## 2022-11-04 ENCOUNTER — OFFICE VISIT (OUTPATIENT)
Dept: FAMILY MEDICINE CLINIC | Facility: CLINIC | Age: 72
End: 2022-11-04

## 2022-11-04 VITALS
HEIGHT: 72 IN | RESPIRATION RATE: 14 BRPM | BODY MASS INDEX: 23.98 KG/M2 | TEMPERATURE: 97.8 F | WEIGHT: 177 LBS | HEART RATE: 106 BPM | DIASTOLIC BLOOD PRESSURE: 96 MMHG | SYSTOLIC BLOOD PRESSURE: 170 MMHG | OXYGEN SATURATION: 99 %

## 2022-11-04 DIAGNOSIS — I10 ESSENTIAL HYPERTENSION: Primary | ICD-10-CM

## 2022-11-04 DIAGNOSIS — E78.2 MIXED HYPERLIPIDEMIA: ICD-10-CM

## 2022-11-04 DIAGNOSIS — G61.81 CIDP (CHRONIC INFLAMMATORY DEMYELINATING POLYNEUROPATHY): ICD-10-CM

## 2022-11-04 PROCEDURE — 99214 OFFICE O/P EST MOD 30 MIN: CPT | Performed by: FAMILY MEDICINE

## 2022-11-04 RX ORDER — METOPROLOL SUCCINATE 50 MG/1
50 TABLET, EXTENDED RELEASE ORAL DAILY
Qty: 90 TABLET | Refills: 3 | Status: SHIPPED | OUTPATIENT
Start: 2022-11-04 | End: 2023-02-10 | Stop reason: SDUPTHER

## 2022-11-04 RX ORDER — ROSUVASTATIN CALCIUM 5 MG/1
5 TABLET, COATED ORAL DAILY
Qty: 90 TABLET | Refills: 3 | Status: SHIPPED | OUTPATIENT
Start: 2022-11-04

## 2022-11-04 RX ORDER — METOPROLOL SUCCINATE 50 MG/1
50 TABLET, EXTENDED RELEASE ORAL DAILY
Qty: 90 TABLET | Refills: 3 | Status: SHIPPED | OUTPATIENT
Start: 2022-11-04 | End: 2022-11-04

## 2023-02-07 NOTE — PROGRESS NOTES
Subjective   Emanuel Alcantara is a 72 y.o. male.     Chief Complaint   Patient presents with   • Hypertension       Hypertension  This is a chronic problem. The current episode started more than 1 year ago. The problem is uncontrolled. Associated symptoms include anxiety. Pertinent negatives include no chest pain, headaches, malaise/fatigue, palpitations, shortness of breath or sweats. There are no associated agents to hypertension. Risk factors for coronary artery disease include dyslipidemia, male gender, family history and stress. Current antihypertension treatment includes beta blockers. The current treatment provides moderate improvement. There are no compliance problems.    Hyperlipidemia  This is a chronic problem. The current episode started more than 1 year ago. Recent lipid tests were reviewed and are high. There are no known factors aggravating his hyperlipidemia. Associated symptoms include leg pain. Pertinent negatives include no chest pain or shortness of breath. Current antihyperlipidemic treatment includes statins. There are no compliance problems.  Risk factors for coronary artery disease include male sex, hypertension and stress.            I personally reviewed and updated the patient's allergies, medications, problem list, and past medical, surgical, social, and family history. I have reviewed and confirmed the accuracy of the History of Present Illness and Review of Symptoms as documented by the MA/LPN/RN. Monty Henry MD    Family History   Problem Relation Age of Onset   • Hypertension Mother    • Osteoporosis Mother    • Glaucoma Mother    • Macular degeneration Mother    • Heart failure Father    • Hypertension Father    • Obesity Father    • Hypertension Sister    • Stomach cancer Maternal Grandfather    • Diabetes Paternal Grandmother    • Pancreatic cancer Paternal Grandmother    • Aneurysm Paternal Grandfather        Social History     Tobacco Use   • Smoking status: Never     Passive  exposure: Never   • Smokeless tobacco: Never   Vaping Use   • Vaping Use: Never used   Substance Use Topics   • Alcohol use: Not Currently   • Drug use: Never       History reviewed. No pertinent surgical history.    Patient Active Problem List   Diagnosis   • Allergic rhinitis   • Benign prostatic hyperplasia   • Mixed hyperlipidemia   • Essential hypertension   • Osteoarthritis   • Prostatitis, chronic   • Encounter for screening for malignant neoplasm of prostate   • Balanitis   • Direct inguinal hernia   • Medicare annual wellness visit, subsequent   • Nevus   • Dysplastic nevus   • Bilateral leg weakness   • Bilateral arm weakness   • Paresthesia of foot, bilateral   • Paresthesia of hand, bilateral   • Ataxia   • Multiple falls   • COVID-19 virus infection   • Weakness   • Cytokine release syndrome, grade 1   • CIDP (chronic inflammatory demyelinating polyneuropathy) ([facility])   • Screening for colon cancer         Current Outpatient Medications:   •  aspirin 81 MG EC tablet, Take 81 mg by mouth. Every other day, Disp: , Rfl:   •  metoprolol succinate XL (TOPROL-XL) 100 MG 24 hr tablet, Take 1 tablet by mouth Daily., Disp: 90 tablet, Rfl: 3  •  rosuvastatin (CRESTOR) 5 MG tablet, Take 1 tablet by mouth Daily., Disp: 90 tablet, Rfl: 3         Review of Systems   Constitutional: Negative for chills, diaphoresis and malaise/fatigue.   Eyes: Negative for visual disturbance.   Respiratory: Negative for shortness of breath.    Cardiovascular: Negative for chest pain and palpitations.   Gastrointestinal: Negative for abdominal pain and nausea.   Endocrine: Negative for polydipsia and polyphagia.   Musculoskeletal: Negative for neck stiffness.   Skin: Negative for color change and pallor.   Neurological: Negative for seizures and syncope.   Hematological: Negative for adenopathy.   Psychiatric/Behavioral: Negative for hallucinations and suicidal ideas.       I have reviewed and confirmed the accuracy of the ROS as  "documented by the MA/LPN/RN Monty Henry MD      Objective   /85   Pulse 116   Temp 97.3 °F (36.3 °C)   Resp 18   Ht 182.9 cm (72.01\")   Wt 81.9 kg (180 lb 9.6 oz)   SpO2 98%   BMI 24.49 kg/m²   BP Readings from Last 3 Encounters:   02/10/23 155/85   11/04/22 170/96   09/23/22 162/90     Wt Readings from Last 3 Encounters:   02/10/23 81.9 kg (180 lb 9.6 oz)   11/04/22 80.3 kg (177 lb)   10/18/22 81.2 kg (179 lb)     Physical Exam  Constitutional:       Appearance: Normal appearance. He is well-developed. He is not diaphoretic.   Cardiovascular:      Rate and Rhythm: Normal rate and regular rhythm.      Pulses: Normal pulses.      Heart sounds: Normal heart sounds, S1 normal and S2 normal. No murmur heard.    No friction rub. No gallop.   Pulmonary:      Effort: Pulmonary effort is normal. No accessory muscle usage.      Breath sounds: Normal breath sounds. No stridor. No decreased breath sounds, wheezing, rhonchi or rales.   Abdominal:      General: Bowel sounds are normal. There is no distension.      Palpations: Abdomen is soft. Abdomen is not rigid. There is no mass or pulsatile mass.      Tenderness: There is no abdominal tenderness. There is no guarding or rebound. Negative signs include Castillo's sign.      Hernia: No hernia is present.   Skin:     General: Skin is warm and dry.      Coloration: Skin is not pale.   Neurological:      Mental Status: He is alert and oriented to person, place, and time.      Cranial Nerves: No cranial nerve deficit.      Sensory: No sensory deficit.      Motor: No tremor, abnormal muscle tone or seizure activity.      Coordination: Coordination normal.      Gait: Gait normal.      Deep Tendon Reflexes: Reflexes are normal and symmetric.         Data / Lab Results:    No results found for: HGBA1C     Lab Results   Component Value Date     (H) 09/14/2022     (H) 09/13/2021     (H) 03/08/2021     Lab Results   Component Value Date    CHOL 232 (H) " 03/08/2019    CHOL 227 (H) 11/26/2018    CHOL 214 (H) 06/17/2016     Lab Results   Component Value Date    TRIG 99 09/14/2022    TRIG 61 09/13/2021    TRIG 91 03/08/2021     Lab Results   Component Value Date    HDL 47 09/14/2022    HDL 44 09/13/2021    HDL 47 03/08/2021     Lab Results   Component Value Date    PSA 1.3 09/14/2022    PSA 1.3 09/13/2021    PSA 1.2 09/01/2020     Lab Results   Component Value Date    WBC 7.0 09/14/2022    HGB 15.2 09/14/2022    HCT 46.1 09/14/2022    MCV 92 09/14/2022     09/14/2022     Lab Results   Component Value Date    TSH 4.330 09/14/2022      Lab Results   Component Value Date    GLUCOSE 96 09/14/2022    BUN 13 09/14/2022    CREATININE 0.99 09/14/2022    EGFRIFNONA 104 12/07/2021    EGFRIFAFRI 95 09/13/2021    BCR 13 09/14/2022    K 4.2 09/14/2022    CO2 21 09/14/2022    CALCIUM 10.0 09/14/2022    PROTENTOTREF 7.7 09/14/2022    ALBUMIN 4.6 09/14/2022    LABIL2 1.5 09/14/2022    AST 29 09/14/2022    ALT 26 09/14/2022     Lab Results   Component Value Date    SEDRATE 21 (H) 12/06/2021      Lab Results   Component Value Date    CRP 0.47 12/06/2021      No results found for: IRON, TIBC, FERRITIN   Lab Results   Component Value Date    CVKXLAHV98 1,324 (H) 12/06/2021          Assessment & Plan      Medications        Problem List         LOS  Health maintenance.  Doing well.  Neurology recommendations holding All vaccinations.  Tolerating coated baby aspirin every other day.  Discussed health maintenance, screening test, lifestyle modification.  Hypertension.  Improved today on metoprolol XL 50 mg daily, dose increased, home blood pressure results reviewed, home blood pressure monitor results reviewed, tolerating Toprol-XL, Continu monitor blood pressure, follow-up in 6-8 weeks.  Remote history of benign cardiac stress testing, recommend repeat stress echo, carotid Dopplers, AAA ultrasound, plan scheduled blood pressure better controlled.    Palpitations.  History of, much  improved on atenolol, changed to Toprol..  Limit caffeine.  Hyperlipidemia.  Worse today, LDL to 132, increase Crestor to 4 days/week.  Discussed diet and exercise lifestyle to modification.  Discussed diet, exercise, lifestyle modification.   Direct inguinal hernia.  Small/nontender, asymptomatic currently.  Continue to monitor clinically.  Consider surgery referral if worsening symptoms.  Asymmetric abdominal fat.  He is worse today, LDL back to 132, concerned about a prominence of his abdominal wall which is a symmetric.  Benign exam today, asymptomatic.  Reassurance given.  Has had CT abdomen will get records.  Follow-up recheck.  Consider repeat imaging, surgery referral if worsening symptoms.  Colon screening.  Has had colonoscopy 2015, repeat was recommended in 10 years, will get the records.  Followed by [facility].  Screening for prostate cancer.  PSA benign.  Dysplastic nevus.  With severe atypia.  Right forearm, healing well status post resection, sutures removed today.  Margins clear but close, recommend further resection, dermatology referral scheduled.  Positive multiple nevi on exam, benign appearance today.  Sun protection discussed.  Follow-up for yearly skin exams.-Positive family history melanoma, father.  COVID-19 viral respiratory infection.  Improved/resolved currently, has completed quarantine.  Has not been vaccinated.  Proximal muscle weakness.  Persistent symptoms today, has neurology follow-up upcomi, had been making steady improvement in past, most symptoms but about the same for the last 3 months.  Status post benign MRI brain/C-spine/neurology eval inpatient.  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  Clinically improved today but having some persistent extremity numbness/weakness..  Rehabilitation exercises discussed.  Followed by neurology Dr. Wynn at , has had repeat work-up with brain imaging, LP this summer, neurology recommends that he does not take COVID-19  vaccine.  Followed by neurology Dr. Ross DDx includes Guillain-Barré versus COPD.  Consider steroid Rx, PT if persistent symptoms        Diagnoses and all orders for this visit:    1. Essential hypertension (Primary)  -     metoprolol succinate XL (TOPROL-XL) 100 MG 24 hr tablet; Take 1 tablet by mouth Daily.  Dispense: 90 tablet; Refill: 3    2. Mixed hyperlipidemia    3. CIDP (chronic inflammatory demyelinating polyneuropathy) (H[facility])              Expected course, medications, and adverse effects discussed.  Call or return if worsening or persistent symptoms.  I wore protective equipment throughout this patient encounter including a mask, gloves, and eye protection.  Hand hygiene was performed before donning protective equipment and after removal when leaving the room. The complete contents of the Assessment and Plan and Data/Lab Results as documented above have been reviewed and addressed by myself with the patient today as part of an ongoing evaluation / treatment plan.  If some of the documentation has been copied from a previous note and is unchanged it indicates that this problem / plan has been assessed today but is stable from a previous visit and no changes have been recommended.

## 2023-02-10 ENCOUNTER — OFFICE VISIT (OUTPATIENT)
Dept: FAMILY MEDICINE CLINIC | Facility: CLINIC | Age: 73
End: 2023-02-10
Payer: MEDICARE

## 2023-02-10 VITALS
WEIGHT: 180.6 LBS | HEART RATE: 116 BPM | OXYGEN SATURATION: 98 % | SYSTOLIC BLOOD PRESSURE: 155 MMHG | RESPIRATION RATE: 18 BRPM | HEIGHT: 72 IN | BODY MASS INDEX: 24.46 KG/M2 | DIASTOLIC BLOOD PRESSURE: 85 MMHG | TEMPERATURE: 97.3 F

## 2023-02-10 DIAGNOSIS — E78.2 MIXED HYPERLIPIDEMIA: ICD-10-CM

## 2023-02-10 DIAGNOSIS — I10 ESSENTIAL HYPERTENSION: Primary | ICD-10-CM

## 2023-02-10 DIAGNOSIS — G61.81 CIDP (CHRONIC INFLAMMATORY DEMYELINATING POLYNEUROPATHY): ICD-10-CM

## 2023-02-10 PROCEDURE — 99214 OFFICE O/P EST MOD 30 MIN: CPT | Performed by: FAMILY MEDICINE

## 2023-02-10 RX ORDER — METOPROLOL SUCCINATE 100 MG/1
100 TABLET, EXTENDED RELEASE ORAL DAILY
Qty: 90 TABLET | Refills: 3 | Status: SHIPPED | OUTPATIENT
Start: 2023-02-10

## 2023-04-18 NOTE — PROGRESS NOTES
Subjective   Emanuel Alcantara is a 72 y.o. male.     Chief Complaint   Patient presents with   • Hypertension   • Hyperlipidemia       Hypertension  This is a chronic problem. The current episode started more than 1 year ago. The problem is uncontrolled. Associated symptoms include anxiety. Pertinent negatives include no chest pain, headaches, malaise/fatigue, palpitations, shortness of breath or sweats. There are no associated agents to hypertension. Risk factors for coronary artery disease include dyslipidemia, male gender, family history and stress. Current antihypertension treatment includes beta blockers. The current treatment provides moderate improvement. There are no compliance problems.    Hyperlipidemia  This is a chronic problem. The current episode started more than 1 year ago. Recent lipid tests were reviewed and are high. There are no known factors aggravating his hyperlipidemia. Associated symptoms include leg pain. Pertinent negatives include no chest pain or shortness of breath. Current antihyperlipidemic treatment includes statins. There are no compliance problems.  Risk factors for coronary artery disease include male sex, hypertension and stress.            I personally reviewed and updated the patient's allergies, medications, problem list, and past medical, surgical, social, and family history. I have reviewed and confirmed the accuracy of the History of Present Illness and Review of Symptoms as documented by the MA/LPN/RN. Monty Henry MD    Family History   Problem Relation Age of Onset   • Hypertension Mother    • Osteoporosis Mother    • Glaucoma Mother    • Macular degeneration Mother    • Heart failure Father    • Hypertension Father    • Obesity Father    • Hypertension Sister    • Stomach cancer Maternal Grandfather    • Diabetes Paternal Grandmother    • Pancreatic cancer Paternal Grandmother    • Aneurysm Paternal Grandfather        Social History     Tobacco Use   • Smoking status:  Never     Passive exposure: Never   • Smokeless tobacco: Never   Vaping Use   • Vaping Use: Never used   Substance Use Topics   • Alcohol use: Not Currently   • Drug use: Never       History reviewed. No pertinent surgical history.    Patient Active Problem List   Diagnosis   • Allergic rhinitis   • Benign prostatic hyperplasia   • Mixed hyperlipidemia   • Essential hypertension   • Osteoarthritis   • Prostatitis, chronic   • Encounter for screening for malignant neoplasm of prostate   • Balanitis   • Direct inguinal hernia   • Medicare annual wellness visit, subsequent   • Nevus   • Dysplastic nevus   • Bilateral leg weakness   • Bilateral arm weakness   • Paresthesia of foot, bilateral   • Paresthesia of hand, bilateral   • Ataxia   • Multiple falls   • COVID-19 virus infection   • Weakness   • Cytokine release syndrome, grade 1   • CIDP (chronic inflammatory demyelinating polyneuropathy)   • Screening for colon cancer         Current Outpatient Medications:   •  aspirin 81 MG EC tablet, Take 1 tablet by mouth. Every other day, Disp: , Rfl:   •  metoprolol succinate XL (TOPROL-XL) 100 MG 24 hr tablet, Take 1 tablet by mouth Daily., Disp: 90 tablet, Rfl: 3  •  rosuvastatin (CRESTOR) 5 MG tablet, Take 1 tablet by mouth Daily., Disp: 90 tablet, Rfl: 3         Review of Systems   Constitutional: Negative for chills, diaphoresis and malaise/fatigue.   Eyes: Negative for visual disturbance.   Respiratory: Negative for shortness of breath.    Cardiovascular: Negative for chest pain and palpitations.   Gastrointestinal: Negative for abdominal pain and nausea.   Endocrine: Negative for polydipsia and polyphagia.   Musculoskeletal: Negative for neck stiffness.   Skin: Negative for color change and pallor.   Neurological: Negative for seizures and syncope.   Hematological: Negative for adenopathy.   Psychiatric/Behavioral: Negative for hallucinations and suicidal ideas.       I have reviewed and confirmed the accuracy of the  "ROS as documented by the MA/LPN/RN Monty Henry MD      Objective   /92 (BP Location: Right arm, Patient Position: Sitting, Cuff Size: Adult)   Pulse 93   Temp 98.2 °F (36.8 °C) (Temporal)   Resp 16   Ht 182.9 cm (72.01\")   Wt 83.3 kg (183 lb 9.6 oz)   SpO2 97%   BMI 24.89 kg/m²   BP Readings from Last 3 Encounters:   04/21/23 152/92   02/10/23 155/85   11/04/22 170/96     Wt Readings from Last 3 Encounters:   04/21/23 83.3 kg (183 lb 9.6 oz)   02/10/23 81.9 kg (180 lb 9.6 oz)   11/04/22 80.3 kg (177 lb)     Physical Exam  Constitutional:       Appearance: Normal appearance. He is well-developed. He is not diaphoretic.   HENT:      Head: Normocephalic.      Right Ear: Tympanic membrane, ear canal and external ear normal.      Left Ear: Tympanic membrane, ear canal and external ear normal.      Nose: Nose normal.   Eyes:      General: Lids are normal.      Conjunctiva/sclera: Conjunctivae normal.      Pupils: Pupils are equal, round, and reactive to light.   Neck:      Thyroid: No thyromegaly.      Vascular: No carotid bruit or JVD.      Trachea: No tracheal deviation.   Cardiovascular:      Rate and Rhythm: Normal rate and regular rhythm.      Pulses: Normal pulses.      Heart sounds: Normal heart sounds, S1 normal and S2 normal. No murmur heard.    No friction rub. No gallop.   Pulmonary:      Effort: Pulmonary effort is normal. No accessory muscle usage.      Breath sounds: Normal breath sounds. No stridor. No decreased breath sounds, wheezing, rhonchi or rales.   Abdominal:      General: Bowel sounds are normal. There is no distension.      Palpations: Abdomen is soft. Abdomen is not rigid. There is no mass or pulsatile mass.      Tenderness: There is no abdominal tenderness. There is no guarding or rebound. Negative signs include Castillo's sign.      Hernia: No hernia is present.   Lymphadenopathy:      Head:      Right side of head: No submental, submandibular, tonsillar, preauricular, posterior " auricular or occipital adenopathy.      Left side of head: No submental, submandibular, tonsillar, preauricular, posterior auricular or occipital adenopathy.      Cervical: No cervical adenopathy.   Skin:     General: Skin is warm and dry.      Coloration: Skin is not pale.   Neurological:      Mental Status: He is alert and oriented to person, place, and time.      Cranial Nerves: No cranial nerve deficit.      Sensory: No sensory deficit.      Coordination: Coordination normal.      Gait: Gait normal.      Deep Tendon Reflexes: Reflexes are normal and symmetric.         Data / Lab Results:    No results found for: HGBA1C     Lab Results   Component Value Date     (H) 09/14/2022     (H) 09/13/2021     (H) 03/08/2021     Lab Results   Component Value Date    CHOL 232 (H) 03/08/2019    CHOL 227 (H) 11/26/2018    CHOL 214 (H) 06/17/2016     Lab Results   Component Value Date    TRIG 99 09/14/2022    TRIG 61 09/13/2021    TRIG 91 03/08/2021     Lab Results   Component Value Date    HDL 47 09/14/2022    HDL 44 09/13/2021    HDL 47 03/08/2021     Lab Results   Component Value Date    PSA 1.3 09/14/2022    PSA 1.3 09/13/2021    PSA 1.2 09/01/2020     Lab Results   Component Value Date    WBC 7.0 09/14/2022    HGB 15.2 09/14/2022    HCT 46.1 09/14/2022    MCV 92 09/14/2022     09/14/2022     Lab Results   Component Value Date    TSH 4.330 09/14/2022      Lab Results   Component Value Date    GLUCOSE 96 09/14/2022    BUN 13 09/14/2022    CREATININE 0.99 09/14/2022    EGFRIFNONA 104 12/07/2021    EGFRIFAFRI 95 09/13/2021    BCR 13 09/14/2022    K 4.2 09/14/2022    CO2 21 09/14/2022    CALCIUM 10.0 09/14/2022    PROTENTOTREF 7.7 09/14/2022    ALBUMIN 4.6 09/14/2022    LABIL2 1.5 09/14/2022    AST 29 09/14/2022    ALT 26 09/14/2022     Lab Results   Component Value Date    SEDRATE 21 (H) 12/06/2021      Lab Results   Component Value Date    CRP 0.47 12/06/2021      No results found for: IRON, TIBC,  FERRITIN   Lab Results   Component Value Date    TFXOHCJP70 1,324 (H) 12/06/2021          Assessment & Plan      Medications        Problem List         [facility]  Health maintenance.  Doing well.  Neurology recommendations holding All vaccinations.  Tolerating coated baby aspirin every other day.  Discussed health maintenance, screening test, lifestyle modification.  Hypertension.  Improved today on metoprolol  mg daily, home blood pressure results reviewed, home blood pressure monitor results reviewed, tolerating Toprol-XL, Continu monitor blood pressure, follow-up in 3 to 4 months.  Remote history of benign cardiac stress testing, recommend repeat stress echo, carotid Dopplers, AAA ultrasound, plan scheduled blood pressure better controlled.    Palpitations.  History of, much improved on atenolol, changed to Toprol..  Limit caffeine.  Hyperlipidemia.  Worse today, LDL to 132, increase Crestor to 4 days/week.  Discussed diet and exercise lifestyle to modification.  Discussed diet, exercise, lifestyle modification.   Direct inguinal hernia.  Small/nontender, asymptomatic currently.  Continue to monitor clinically.  Consider surgery referral if worsening symptoms.  Asymmetric abdominal fat.  He is worse today, LDL back to 132, concerned about a prominence of his abdominal wall which is a symmetric.  Benign exam today, asymptomatic.  Reassurance given.  Has had CT abdomen will get records.  Follow-up recheck.  Consider repeat imaging, surgery referral if worsening symptoms.  Colon screening.  Has had colonoscopy 2015, repeat was recommended in 10 years, will get the records.  Followed by [facility].  Screening for prostate cancer.  PSA benign.  Dysplastic nevus.  With severe atypia.  Right forearm, healing well status post resection, sutures removed today.  Margins clear but close, recommend further resection, dermatology referral scheduled.  Positive multiple nevi on exam, benign appearance today.  Sun protection  discussed.  Follow-up for yearly skin exams.-Positive family history melanoma, father.  COVID-19 viral respiratory infection.  Improved/resolved currently, has completed quarantine.  Has not been vaccinated.  Proximal muscle weakness.  Persistent symptoms today, has neurology follow-up upcomi, had been making steady improvement in past, most symptoms but about the same for the last 3 months.  Status post benign MRI brain/C-spine/neurology eval inpatient.  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  Clinically improved today but having some persistent extremity numbness/weakness..  Rehabilitation exercises discussed.  Followed by neurology Dr. Wynn at , has had repeat work-up with brain imaging, LP this summer, neurology recommends that he does not take COVID-19 vaccine.  Has had eval by neurology Dr. Ross DDx includes Guillain-Barré versus COPD.  Consider steroid Rx, PT if persistent symptoms        Diagnoses and all orders for this visit:    1. Essential hypertension (Primary)    2. Mixed hyperlipidemia    3. CIDP (chronic inflammatory demyelinating polyneuropathy)              Expected course, medications, and adverse effects discussed.  Call or return if worsening or persistent symptoms.  I wore protective equipment throughout this patient encounter including a mask, gloves, and eye protection.  Hand hygiene was performed before donning protective equipment and after removal when leaving the room. The complete contents of the Assessment and Plan and Data/Lab Results as documented above have been reviewed and addressed by myself with the patient today as part of an ongoing evaluation / treatment plan.  If some of the documentation has been copied from a previous note and is unchanged it indicates that this problem / plan has been assessed today but is stable from a previous visit and no changes have been recommended.

## 2023-04-21 ENCOUNTER — OFFICE VISIT (OUTPATIENT)
Dept: FAMILY MEDICINE CLINIC | Facility: CLINIC | Age: 73
End: 2023-04-21
Payer: MEDICARE

## 2023-04-21 VITALS
WEIGHT: 183.6 LBS | OXYGEN SATURATION: 97 % | HEART RATE: 93 BPM | RESPIRATION RATE: 16 BRPM | BODY MASS INDEX: 24.87 KG/M2 | TEMPERATURE: 98.2 F | SYSTOLIC BLOOD PRESSURE: 152 MMHG | DIASTOLIC BLOOD PRESSURE: 92 MMHG | HEIGHT: 72 IN

## 2023-04-21 DIAGNOSIS — E78.2 MIXED HYPERLIPIDEMIA: ICD-10-CM

## 2023-04-21 DIAGNOSIS — G61.81 CIDP (CHRONIC INFLAMMATORY DEMYELINATING POLYNEUROPATHY): ICD-10-CM

## 2023-04-21 DIAGNOSIS — I10 ESSENTIAL HYPERTENSION: Primary | ICD-10-CM

## 2023-04-21 PROCEDURE — 3080F DIAST BP >= 90 MM HG: CPT | Performed by: FAMILY MEDICINE

## 2023-04-21 PROCEDURE — 99214 OFFICE O/P EST MOD 30 MIN: CPT | Performed by: FAMILY MEDICINE

## 2023-04-21 PROCEDURE — 3077F SYST BP >= 140 MM HG: CPT | Performed by: FAMILY MEDICINE

## 2023-09-26 NOTE — PROGRESS NOTES
Subjective   Emanuel Alcantara is a 72 y.o. male.     Chief Complaint   Patient presents with    Medicare Wellness-subsequent    Hypertension    Hyperlipidemia       The patient is here: to discuss health maintenance and disease prevention to follow up on multiple medical problems.  Last comprehensive physical was on 9/23/2022.  Previous physical was performed by  Monty Henry MD  Overall has: moderate activity with work/home activities, exercises 2 - 3 times per week, good appetite, feels well with minor complaints, good energy level, is sleeping well, and returned to full activity. Nutrition: appropriate balanced diet, balanced diet, and eating a variety of foods. Last tetanus shot was  10/27/2016 . Patient's last PSA was:  1.8 on 9/23/2023      Last Completed Colonoscopy            COLORECTAL CANCER SCREENING (COLONOSCOPY - Every 10 Years) Next due on 1/1/2025 01/01/2015  COLONOSCOPY (Done)    03/17/2005  SCANNED - COLONOSCOPY                    History of Present Illness     Recent Hospitalizations:  No hospitalization(s) within the last year..  ccc    I personally reviewed and updated the patient's allergies, medications, problem list, and past medical, surgical, social, and family history. I have reviewed and confirmed the accuracy of the HPI and ROS as documented by the MA/LPN/RN Shira Alas MA      Family History   Problem Relation Age of Onset    Hypertension Mother     Osteoporosis Mother     Glaucoma Mother     Macular degeneration Mother     Heart failure Father     Hypertension Father     Obesity Father     Hypertension Sister     Stomach cancer Maternal Grandfather     Diabetes Paternal Grandmother     Pancreatic cancer Paternal Grandmother     Aneurysm Paternal Grandfather        Social History     Tobacco Use    Smoking status: Never     Passive exposure: Never    Smokeless tobacco: Never   Vaping Use    Vaping Use: Never used   Substance Use Topics    Alcohol use: Not Currently    Drug use:  "Never       History reviewed. No pertinent surgical history.    Patient Active Problem List   Diagnosis    Allergic rhinitis    Benign prostatic hyperplasia    Mixed hyperlipidemia    Essential hypertension    Osteoarthritis    Prostatitis, chronic    Encounter for screening for malignant neoplasm of prostate    Balanitis    Direct inguinal hernia    Medicare annual wellness visit, subsequent    Nevus    Dysplastic nevus    Bilateral leg weakness    Bilateral arm weakness    Paresthesia of foot, bilateral    Paresthesia of hand, bilateral    Ataxia    Multiple falls    COVID-19 virus infection    Weakness    Cytokine release syndrome, grade 1    CIDP (chronic inflammatory demyelinating polyneuropathy)    Screening for colon cancer         Current Outpatient Medications:     aspirin 81 MG EC tablet, Take 1 tablet by mouth. Every other day, Disp: , Rfl:     metoprolol succinate XL (TOPROL-XL) 100 MG 24 hr tablet, Take 1 tablet by mouth Daily., Disp: 90 tablet, Rfl: 3    rosuvastatin (CRESTOR) 5 MG tablet, Take 1 tablet by mouth Daily., Disp: 90 tablet, Rfl: 3      Objective   /86 (BP Location: Right arm, Patient Position: Sitting, Cuff Size: Adult)   Pulse 100   Temp 99.1 °F (37.3 °C) (Temporal)   Resp 18   Ht 182.9 cm (72.01\")   Wt 82.3 kg (181 lb 6.4 oz)   SpO2 98%   BMI 24.60 kg/m²   BP Readings from Last 3 Encounters:   09/29/23 142/86   04/21/23 152/92   02/10/23 155/85     Wt Readings from Last 3 Encounters:   09/29/23 82.3 kg (181 lb 6.4 oz)   04/21/23 83.3 kg (183 lb 9.6 oz)   02/10/23 81.9 kg (180 lb 9.6 oz)         Age-appropriate Screening Schedule:  Refer to the list below for future screening recommendations based on patient's age, sex and/or medical conditions. Orders for these [facility] Rudolph tests are listed in the plan section. The patient has been provided with a written plan.    Health Maintenance   Topic Date Due    COVID-19 Vaccine (1) Never done    ZOSTER VACCINE (1 of 2) Never done    " HEPATITIS C SCREENING  Never done    INFLUENZA VACCINE  Never done    Pneumococcal Vaccine 65+ (2 - PCV) 2024 (Originally 12/10/2017)    LIPID PANEL  2024    ANNUAL WELLNESS VISIT  2024    COLORECTAL CANCER SCREENING  2025    TDAP/TD VACCINES (3 - Td or Tdap) 10/27/2026       Depression Screen:       2023     9:45 AM   PHQ-2/PHQ-9 Depression Screening   Little Interest or Pleasure in Doing Things 0-->not at all   Feeling Down, Depressed or Hopeless 0-->not at all   PHQ-9: Brief Depression Severity Measure Score 0     I spent more than 16 minutes asking patient questions, counseling and documenting in the chart.    Health Habits and Functional and Cognitive Screenin/29/2023     9:00 AM   Functional & Cognitive Status   Do you have difficulty preparing food and eating? No   Do you have difficulty bathing yourself, getting dressed or grooming yourself? No   Do you have difficulty using the toilet? No   Do you have difficulty moving around from place to place? No   Do you have trouble with steps or getting out of a bed or a chair? No   Current Diet Well Balanced Diet   Dental Exam Up to date   Eye Exam Up to date   Exercise (times per week) 4 times per week   Current Exercises Include Yard Work;Walking   Do you need help using the phone?  No   Are you deaf or do you have serious difficulty hearing?  No   Do you need help to go to places out of walking distance? No   Do you need help shopping? No   Do you need help preparing meals?  No   Do you need help with housework?  No   Do you need help with laundry? No   Do you need help taking your medications? No   Do you need help managing money? No   Do you ever drive or ride in a car without wearing a seat belt? No   Have you felt unusual stress, anger or loneliness in the last month? No   Who do you live with? Spouse   If you need help, do you have trouble finding someone available to you? No   Do you have difficulty concentrating,  remembering or making decisions? No       Does the patient have evidence of cognitive impairment? No    Advance Care Planning:  ACP discussion was held with the patient during this visit. Patient has an advance directive in EMR which is still valid.      A face-to-face visit was completed today with patient.  Counseling explanation, and discussion of advanced directives was performed.   The last advanced care visit was performed in 2024.  In a near life ending situation, from which he is not expected to recover functionally, and he is not able to speak for him, he does not want life sustaining measures. We discussed feeding tubes, ventilators and cardiac support as well as dialysis.    I spent more than 16 minutes discussing Advanced Care Planning information and documenting in the chart.    Identification of Risk Factors:  Risk factors include: Advance Directive Discussion  Colon Cancer Screening  Dementia/Memory   Fall Risk  Immunizations Discussed/Encouraged (specific immunizations; Influenza and COVID19 )  Obesity/Overweight   Prostate Cancer Screening   Urinary Incontinence.    Compared to one year ago, the patient feels his physical health is the same.  Compared to one year ago, the patient feels his mental health is the same.    Patient Self-Management and Personalized Health Advice  The patient has been provided with information about: diet, exercise, weight management, fall prevention, designing advance directives, supplements, and mental health concerns and preventive services including:   Annual Wellness Visit (AWV)  Bone Density Measurements  Cardiovascular Disease Screening Tests (may do this order every 5 years in beneficiaries without signs or symptoms of cardiovascular disease)  Colorectal Cancer Screening, Colonoscopy  Colorectal Cancer Screening, Cologuard Test   Counseling to Prevent Tobacco Use (use of smartset and @cessation@ smartphrase for documentation)  Depression Screening (15 minutes face  to face, Code ).      Assessment & Plan      Medications        Problem List         LOS      Diagnoses and all orders for this visit:    1. Medicare annual wellness visit, subsequent (Primary)    2. Essential hypertension    3. Mixed hyperlipidemia    4. Encounter for screening for malignant neoplasm of prostate    5. Screening for colon cancer        Medicare wellness.  Discussed health maintenance, routine immunizations, screening tests, lifestyle modification.

## 2023-09-26 NOTE — PROGRESS NOTES
Subjective   Emanuel Alcantara is a 72 y.o. male.     Chief Complaint   Patient presents with    Medicare Wellness-subsequent    Hypertension    Hyperlipidemia       History of Present Illness  Influenza immunization was not given due to patient refusal.    Hypertension  This is a chronic problem. The current episode started more than 1 year ago. The problem is uncontrolled. Associated symptoms include anxiety. Pertinent negatives include no chest pain, headaches, malaise/fatigue, palpitations, shortness of breath or sweats. There are no associated agents to hypertension. Risk factors for coronary artery disease include dyslipidemia, male gender, family history and stress. Current antihypertension treatment includes beta blockers. The current treatment provides moderate improvement. There are no compliance problems.    Hyperlipidemia  This is a chronic problem. The current episode started more than 1 year ago. Recent lipid tests were reviewed and are high. There are no known factors aggravating his hyperlipidemia. Associated symptoms include leg pain. Pertinent negatives include no chest pain or shortness of breath. Current antihyperlipidemic treatment includes statins. There are no compliance problems.  Risk factors for coronary artery disease include male sex, hypertension and stress.          I personally reviewed and updated the patient's allergies, medications, problem list, and past medical, surgical, social, and family history. I have reviewed and confirmed the accuracy of the History of Present Illness and Review of Symptoms as documented by the MA/LPN/RN. Monty Henry MD    Family History   Problem Relation Age of Onset    Hypertension Mother     Osteoporosis Mother     Glaucoma Mother     Macular degeneration Mother     Heart failure Father     Hypertension Father     Obesity Father     Hypertension Sister     Prostate cancer Brother     Stomach cancer Maternal Grandfather     Diabetes Paternal Grandmother      Pancreatic cancer Paternal Grandmother     Aneurysm Paternal Grandfather        Social History     Tobacco Use    Smoking status: Never     Passive exposure: Never    Smokeless tobacco: Never   Vaping Use    Vaping Use: Never used   Substance Use Topics    Alcohol use: Not Currently    Drug use: Never       History reviewed. No pertinent surgical history.    Patient Active Problem List   Diagnosis    Allergic rhinitis    Benign prostatic hyperplasia    Mixed hyperlipidemia    Essential hypertension    Osteoarthritis    Prostatitis, chronic    Encounter for screening for malignant neoplasm of prostate    Balanitis    Direct inguinal hernia    Medicare annual wellness visit, subsequent    Nevus    Dysplastic nevus    Bilateral leg weakness    Bilateral arm weakness    Paresthesia of foot, bilateral    Paresthesia of hand, bilateral    Ataxia    Multiple falls    COVID-19 virus infection    Weakness    Cytokine release syndrome, grade 1    CIDP (chronic inflammatory demyelinating polyneuropathy)    Screening for colon cancer         Current Outpatient Medications:     aspirin 81 MG EC tablet, Take 1 tablet by mouth. Every other day, Disp: , Rfl:     metoprolol succinate XL (TOPROL-XL) 100 MG 24 hr tablet, Take 1 tablet by mouth Daily., Disp: 90 tablet, Rfl: 3    rosuvastatin (CRESTOR) 5 MG tablet, Take 1 tablet by mouth Daily., Disp: 90 tablet, Rfl: 3    tamsulosin (FLOMAX) 0.4 MG capsule 24 hr capsule, Take 1 capsule by mouth At Night As Needed (BPH)., Disp: 90 capsule, Rfl: 3         Review of Systems   Constitutional:  Negative for chills, diaphoresis and malaise/fatigue.   HENT:  Negative for trouble swallowing and voice change.    Eyes:  Negative for visual disturbance.   Respiratory:  Negative for shortness of breath.    Cardiovascular:  Negative for chest pain and palpitations.   Gastrointestinal:  Negative for abdominal pain and nausea.   Endocrine: Negative for polydipsia and polyphagia.   Genitourinary:   "Negative for hematuria.   Musculoskeletal:  Negative for neck stiffness.   Skin:  Negative for color change and pallor.   Allergic/Immunologic: Negative for immunocompromised state.   Neurological:  Negative for seizures and syncope.   Hematological:  Negative for adenopathy.   Psychiatric/Behavioral:  Negative for hallucinations, sleep disturbance and suicidal ideas.      I have reviewed and confirmed the accuracy of the ROS as documented by the MA/LPN/RN Monty Henry MD      Objective   /86 (BP Location: Right arm, Patient Position: Sitting, Cuff Size: Adult)   Pulse 100   Temp 99.1 °F (37.3 °C) (Temporal)   Resp 18   Ht 182.9 cm (72.01\")   Wt 82.3 kg (181 lb 6.4 oz)   SpO2 98%   BMI 24.60 kg/m²   BP Readings from Last 3 Encounters:   09/29/23 142/86   04/21/23 152/92   02/10/23 155/85     Wt Readings from Last 3 Encounters:   09/29/23 82.3 kg (181 lb 6.4 oz)   04/21/23 83.3 kg (183 lb 9.6 oz)   02/10/23 81.9 kg (180 lb 9.6 oz)     Physical Exam  Constitutional:       Appearance: He is well-developed. He is not diaphoretic.   HENT:      Head: Normocephalic.      Right Ear: Tympanic membrane, ear canal and external ear normal.      Left Ear: Tympanic membrane, ear canal and external ear normal.      Nose: Nose normal.   Eyes:      General: Lids are normal.      Conjunctiva/sclera: Conjunctivae normal.      Pupils: Pupils are equal, round, and reactive to light.   Neck:      Thyroid: No thyromegaly.      Vascular: No carotid bruit or JVD.      Trachea: No tracheal deviation.   Cardiovascular:      Rate and Rhythm: Normal rate and regular rhythm.      Heart sounds: Normal heart sounds. No murmur heard.    No friction rub. No gallop.   Pulmonary:      Effort: Pulmonary effort is normal.      Breath sounds: Normal breath sounds. No stridor. No decreased breath sounds, wheezing or rales.   Abdominal:      General: Bowel sounds are normal. There is no distension.      Palpations: Abdomen is soft. There is " no mass.      Tenderness: There is no abdominal tenderness. There is no guarding or rebound.      Hernia: No hernia is present.   Lymphadenopathy:      Head:      Right side of head: No submental, submandibular, tonsillar, preauricular, posterior auricular or occipital adenopathy.      Left side of head: No submental, submandibular, tonsillar, preauricular, posterior auricular or occipital adenopathy.      Cervical: No cervical adenopathy.   Skin:     General: Skin is warm and dry.      Coloration: Skin is not pale.   Neurological:      Mental Status: He is alert and oriented to person, place, and time.      Cranial Nerves: No cranial nerve deficit.      Sensory: No sensory deficit.      Coordination: Coordination normal.      Gait: Gait normal.      Deep Tendon Reflexes: Reflexes are normal and symmetric.       Data / Lab Results:    No results found for: HGBA1C     Lab Results   Component Value Date     (H) 09/23/2023     (H) 09/14/2022     (H) 09/13/2021     Lab Results   Component Value Date    CHOL 232 (H) 03/08/2019    CHOL 227 (H) 11/26/2018    CHOL 214 (H) 06/17/2016     Lab Results   Component Value Date    TRIG 84 09/23/2023    TRIG 99 09/14/2022    TRIG 61 09/13/2021     Lab Results   Component Value Date    HDL 47 09/23/2023    HDL 47 09/14/2022    HDL 44 09/13/2021     Lab Results   Component Value Date    PSA 1.8 09/23/2023    PSA 1.3 09/14/2022    PSA 1.3 09/13/2021     Lab Results   Component Value Date    WBC 6.3 09/23/2023    HGB 14.6 09/23/2023    HCT 44.3 09/23/2023    MCV 91 09/23/2023     09/23/2023     Lab Results   Component Value Date    TSH 6.120 (H) 09/23/2023      Lab Results   Component Value Date    GLUCOSE 96 09/23/2023    BUN 16 09/23/2023    CREATININE 0.95 09/23/2023    EGFRIFNONA 104 12/07/2021    EGFRIFAFRI 95 09/13/2021    BCR 17 09/23/2023    K 4.8 09/23/2023    CO2 23 09/23/2023    CALCIUM 10.0 09/23/2023    PROTENTOTREF 7.8 09/23/2023    ALBUMIN 4.9  09/23/2023    LABIL2 1.7 09/23/2023    AST 41 (H) 09/23/2023    ALT 34 09/23/2023     Lab Results   Component Value Date    SEDRATE 21 (H) 12/06/2021      Lab Results   Component Value Date    CRP 0.47 12/06/2021      No results found for: IRON, TIBC, FERRITIN   Lab Results   Component Value Date    NDSHSWUK41 1,324 (H) 12/06/2021          Assessment & Plan      Medications        Problem List         LOS    Health maintenance.  Doing well.  Neurology recommendations holding All vaccinations.  Tolerating coated baby aspirin every other day.  Discussed health maintenance, screening test, lifestyle modification.  Hypertension.  Improved today on metoprolol  mg daily, home blood pressure results reviewed, home blood pressure monitor results reviewed, tolerating Toprol-XL, Continu monitor blood pressure,  Remote history of benign cardiac stress testing,  repeat stress echo, carotid Dopplers, AAA ultrasound scheduled.  blood pressure better controlled.  He does have some numbness in his feet from his CIDP but feels he can walk on the treadmill.  Palpitations.  History of, much improved on atenolol, changed to Toprol..  Limit caffeine.  Hyperlipidemia.  Improved today, LDL to 132, increase Crestor to 7 days/week.  Discussed diet and exercise lifestyle to modification.  Discussed diet, exercise, lifestyle modification.   Direct inguinal hernia.  Small/nontender, asymptomatic currently.  Continue to monitor clinically.  Consider surgery referral if worsening symptoms.  Asymmetric abdominal fat.  He is worse today, LDL back to 132, concerned about a prominence of his abdominal wall which is a symmetric.  Benign exam today, asymptomatic.  Reassurance given.  Has had CT abdomen will get records.  Follow-up recheck.  Consider repeat imaging, surgery referral if worsening symptoms.  Colon screening.  Has had colonoscopy 2015, repeat was recommended in 10 years, will get the records.  Followed by [facility].  Screening for  prostate cancer.  PSA benign.  Dysplastic nevus.  With severe atypia.  Right forearm, healing well status post resection, sutures removed today.  Margins clear but close, recommend further resection, dermatology referral scheduled.  Positive multiple nevi on exam, benign appearance today.  Sun protection discussed.  Follow-up for yearly skin exams.-Positive family history melanoma, father.  COVID-19 viral respiratory infection.  Improved/resolved currently, has completed quarantine.  Has not been vaccinated.  Proximal muscle weakness.  Persistent symptoms today, has neurology follow-up upcomi, had been making steady improvement in past, most symptoms but about the same for the last 3 months.  Status post benign MRI brain/C-spine/neurology eval inpatient.  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  Clinically improved today but having some persistent extremity numbness/weakness..  Rehabilitation exercises discussed.  Followed by neurology Dr. Wynn at , has had repeat work-up with brain imaging, LP this summer, neurology recommends that he does not take COVID-19 vaccine.  Followed by neurology Dr. Ross DDx includes Guillain-Barré versus COPD.  Consider steroid Rx, PT if persistent symptoms  Family history of prostate cancer.  His brother, recently diagnosed.  PSA benign, continue to monitor.  BPH.  Overall worse, tolerating saw palmetto, consider add Flomax.  Limit caffeine.    Diagnoses and all orders for this visit:    1. Medicare annual wellness visit, subsequent (Primary)    2. Essential hypertension  -     CBC & Differential; Future  -     Comprehensive Metabolic Panel; Future  -     TSH; Future    3. Mixed hyperlipidemia  -     Lipid Panel With / Chol / HDL Ratio; Future  -     rosuvastatin (CRESTOR) 5 MG tablet; Take 1 tablet by mouth Daily.  Dispense: 90 tablet; Refill: 3    4. Encounter for screening for malignant neoplasm of prostate  -     PSA Screen; Future    5. Screening for colon  cancer    6. Benign prostatic hyperplasia with urinary frequency  -     tamsulosin (FLOMAX) 0.4 MG capsule 24 hr capsule; Take 1 capsule by mouth At Night As Needed (BPH).  Dispense: 90 capsule; Refill: 3    7. Chest pain, unspecified type  -     Adult Stress Echo W/ Cont or Stress Agent if Necessary Per Protocol; Future    8. Carotid bruit, unspecified laterality  -     US Carotid Bilateral; Future    9. Family history of abdominal aortic aneurysm (AAA)  -     US aaa screen limited; Future    10. CIDP (chronic inflammatory demyelinating polyneuropathy)              Expected course, medications, and adverse effects discussed.  Call or return if worsening or persistent symptoms.  I wore protective equipment throughout this patient encounter including a mask, gloves, and eye protection.  Hand hygiene was performed before donning protective equipment and after removal when leaving the room. The complete contents of the Assessment and Plan and Data/Lab Results as documented above have been reviewed and addressed by myself with the patient today as part of an ongoing evaluation / treatment plan.  If some of the documentation has been copied from a previous note and is unchanged it indicates that this problem / plan has been assessed today but is stable from a previous visit and no changes have been recommended.

## 2023-09-29 ENCOUNTER — OFFICE VISIT (OUTPATIENT)
Dept: FAMILY MEDICINE CLINIC | Facility: CLINIC | Age: 73
End: 2023-09-29
Payer: MEDICARE

## 2023-09-29 VITALS
WEIGHT: 181.4 LBS | BODY MASS INDEX: 24.57 KG/M2 | DIASTOLIC BLOOD PRESSURE: 86 MMHG | OXYGEN SATURATION: 98 % | RESPIRATION RATE: 18 BRPM | TEMPERATURE: 99.1 F | HEIGHT: 72 IN | HEART RATE: 100 BPM | SYSTOLIC BLOOD PRESSURE: 142 MMHG

## 2023-09-29 DIAGNOSIS — Z12.11 SCREENING FOR COLON CANCER: ICD-10-CM

## 2023-09-29 DIAGNOSIS — Z12.5 ENCOUNTER FOR SCREENING FOR MALIGNANT NEOPLASM OF PROSTATE: ICD-10-CM

## 2023-09-29 DIAGNOSIS — G61.81 CIDP (CHRONIC INFLAMMATORY DEMYELINATING POLYNEUROPATHY): ICD-10-CM

## 2023-09-29 DIAGNOSIS — N40.1 BENIGN PROSTATIC HYPERPLASIA WITH URINARY FREQUENCY: ICD-10-CM

## 2023-09-29 DIAGNOSIS — R35.0 BENIGN PROSTATIC HYPERPLASIA WITH URINARY FREQUENCY: ICD-10-CM

## 2023-09-29 DIAGNOSIS — Z82.49 FAMILY HISTORY OF ABDOMINAL AORTIC ANEURYSM (AAA): ICD-10-CM

## 2023-09-29 DIAGNOSIS — I10 ESSENTIAL HYPERTENSION: ICD-10-CM

## 2023-09-29 DIAGNOSIS — R09.89 CAROTID BRUIT, UNSPECIFIED LATERALITY: ICD-10-CM

## 2023-09-29 DIAGNOSIS — R07.9 CHEST PAIN, UNSPECIFIED TYPE: ICD-10-CM

## 2023-09-29 DIAGNOSIS — E78.2 MIXED HYPERLIPIDEMIA: ICD-10-CM

## 2023-09-29 DIAGNOSIS — Z00.00 MEDICARE ANNUAL WELLNESS VISIT, SUBSEQUENT: Primary | ICD-10-CM

## 2023-09-29 RX ORDER — TAMSULOSIN HYDROCHLORIDE 0.4 MG/1
1 CAPSULE ORAL NIGHTLY PRN
Qty: 90 CAPSULE | Refills: 3 | Status: SHIPPED | OUTPATIENT
Start: 2023-09-29

## 2023-09-29 RX ORDER — ROSUVASTATIN CALCIUM 5 MG/1
5 TABLET, COATED ORAL DAILY
Qty: 90 TABLET | Refills: 3 | Status: SHIPPED | OUTPATIENT
Start: 2023-09-29

## 2023-10-05 ENCOUNTER — TELEPHONE (OUTPATIENT)
Dept: FAMILY MEDICINE CLINIC | Facility: CLINIC | Age: 73
End: 2023-10-05

## 2023-10-05 NOTE — TELEPHONE ENCOUNTER
Caller: Emanuel Alcantara    Relationship: Self    Best call back number: [phone]    What is the best time to reach you:     Who are you requesting to speak with (clinical staff, provider,  specific staff member): CLINICAL STAFF     Do you know the name of the person who called:     What was the call regarding: CONCERNING THE LOCATION OF UPCOMING TESTS    Is it okay if the provider responds through MyChart:

## 2023-10-05 NOTE — TELEPHONE ENCOUNTER
Looks like his stress test is scheduled in [place] at [place]. This was suppose to be scheduled with  monitoring the treadmill portion in [place]. I reached out to priscilla to confirm

## 2023-11-14 ENCOUNTER — HOSPITAL ENCOUNTER (OUTPATIENT)
Dept: ULTRASOUND IMAGING | Facility: HOSPITAL | Age: 73
Discharge: HOME | End: 2023-11-14
Payer: MEDICARE

## 2023-11-14 ENCOUNTER — PROCEDURE VISIT (OUTPATIENT)
Dept: FAMILY MEDICINE CLINIC | Facility: CLINIC | Age: 73
End: 2023-11-14
Payer: MEDICARE

## 2023-11-14 ENCOUNTER — HOSPITAL ENCOUNTER (OUTPATIENT)
Dept: CARDIOLOGY | Facility: HOSPITAL | Age: 73
Discharge: HOME | End: 2023-11-14
Payer: MEDICARE

## 2023-11-14 VITALS — HEIGHT: 72 IN | BODY MASS INDEX: 24.55 KG/M2 | WEIGHT: 181.22 LBS

## 2023-11-14 DIAGNOSIS — R09.89 CAROTID BRUIT, UNSPECIFIED LATERALITY: ICD-10-CM

## 2023-11-14 DIAGNOSIS — Z82.49 FAMILY HISTORY OF ABDOMINAL AORTIC ANEURYSM (AAA): ICD-10-CM

## 2023-11-14 DIAGNOSIS — R07.9 CHEST PAIN, UNSPECIFIED TYPE: Primary | ICD-10-CM

## 2023-11-14 DIAGNOSIS — R07.9 CHEST PAIN, UNSPECIFIED TYPE: ICD-10-CM

## 2023-11-14 LAB
BH CV ECHO MEAS - ACS: 2.21 CM
BH CV ECHO MEAS - AO MAX PG: 7.4 MMHG
BH CV ECHO MEAS - AO MEAN PG: 3.6 MMHG
BH CV ECHO MEAS - AO ROOT DIAM: 3.5 CM
BH CV ECHO MEAS - AO V2 MAX: 136.1 CM/SEC
BH CV ECHO MEAS - AO V2 VTI: 23.3 CM
BH CV ECHO MEAS - AVA(I,D): 3.8 CM2
BH CV ECHO MEAS - EDV(CUBED): 123.5 ML
BH CV ECHO MEAS - EDV(MOD-SP4): 103.9 ML
BH CV ECHO MEAS - EF(MOD-BP): 69 %
BH CV ECHO MEAS - EF(MOD-SP4): 69 %
BH CV ECHO MEAS - ESV(CUBED): 28.3 ML
BH CV ECHO MEAS - ESV(MOD-SP4): 32.2 ML
BH CV ECHO MEAS - FS: 38.8 %
BH CV ECHO MEAS - IVS/LVPW: 0.98 CM
BH CV ECHO MEAS - IVSD: 1.07 CM
BH CV ECHO MEAS - LA DIMENSION: 3.4 CM
BH CV ECHO MEAS - LV DIASTOLIC VOL/BSA (35-75): 50.9 CM2
BH CV ECHO MEAS - LV MASS(C)D: 201.6 GRAMS
BH CV ECHO MEAS - LV MAX PG: 4.9 MMHG
BH CV ECHO MEAS - LV MEAN PG: 2.37 MMHG
BH CV ECHO MEAS - LV SYSTOLIC VOL/BSA (12-30): 15.8 CM2
BH CV ECHO MEAS - LV V1 MAX: 110.5 CM/SEC
BH CV ECHO MEAS - LV V1 VTI: 20.7 CM
BH CV ECHO MEAS - LVIDD: 5 CM
BH CV ECHO MEAS - LVIDS: 3 CM
BH CV ECHO MEAS - LVOT AREA: 4.2 CM2
BH CV ECHO MEAS - LVOT DIAM: 2.32 CM
BH CV ECHO MEAS - LVPWD: 1.1 CM
BH CV ECHO MEAS - MR MAX PG: 157 MMHG
BH CV ECHO MEAS - MR MAX VEL: 626.4 CM/SEC
BH CV ECHO MEAS - MV A MAX VEL: 107.2 CM/SEC
BH CV ECHO MEAS - MV DEC SLOPE: 321.4 CM/SEC2
BH CV ECHO MEAS - MV DEC TIME: 0.24 SEC
BH CV ECHO MEAS - MV E MAX VEL: 76.2 CM/SEC
BH CV ECHO MEAS - MV E/A: 0.71
BH CV ECHO MEAS - MV MAX PG: 4.7 MMHG
BH CV ECHO MEAS - MV MEAN PG: 2.03 MMHG
BH CV ECHO MEAS - MV V2 VTI: 18.9 CM
BH CV ECHO MEAS - MVA(VTI): 4.6 CM2
BH CV ECHO MEAS - PA ACC TIME: 0.14 SEC
BH CV ECHO MEAS - PA V2 MAX: 216.2 CM/SEC
BH CV ECHO MEAS - PI END-D VEL: 138.9 CM/SEC
BH CV ECHO MEAS - PULM A REVS DUR: 0.1 SEC
BH CV ECHO MEAS - PULM A REVS VEL: 30.3 CM/SEC
BH CV ECHO MEAS - PULM DIAS VEL: 56.7 CM/SEC
BH CV ECHO MEAS - PULM S/D: 1
BH CV ECHO MEAS - PULM SYS VEL: 56.9 CM/SEC
BH CV ECHO MEAS - QP/QS: 0.66
BH CV ECHO MEAS - RAP SYSTOLE: 10 MMHG
BH CV ECHO MEAS - RV MAX PG: 4.2 MMHG
BH CV ECHO MEAS - RV V1 MAX: 102.4 CM/SEC
BH CV ECHO MEAS - RV V1 VTI: 18.8 CM
BH CV ECHO MEAS - RVOT DIAM: 1.98 CM
BH CV ECHO MEAS - RVSP: 56.4 MMHG
BH CV ECHO MEAS - SI(MOD-SP4): 35.1 ML/M2
BH CV ECHO MEAS - SV(LVOT): 87.5 ML
BH CV ECHO MEAS - SV(MOD-SP4): 71.7 ML
BH CV ECHO MEAS - SV(RVOT): 57.8 ML
BH CV ECHO MEAS - TAPSE (>1.6): 2.2 CM
BH CV ECHO MEAS - TR MAX PG: 46.4 MMHG
BH CV ECHO MEAS - TR MAX VEL: 343.3 CM/SEC
BH CV STRESS BP STAGE 1: NORMAL
BH CV STRESS BP STAGE 2: NORMAL
BH CV STRESS DURATION MIN STAGE 1: 3
BH CV STRESS DURATION MIN STAGE 2: 0
BH CV STRESS DURATION SEC STAGE 1: 0
BH CV STRESS DURATION SEC STAGE 2: 45
BH CV STRESS GRADE STAGE 1: 10
BH CV STRESS GRADE STAGE 2: 12
BH CV STRESS HR STAGE 1: 143
BH CV STRESS HR STAGE 2: 160
BH CV STRESS METS STAGE 1: 5
BH CV STRESS METS STAGE 2: 7.5
BH CV STRESS PROTOCOL 1: NORMAL
BH CV STRESS RECOVERY BP: NORMAL MMHG
BH CV STRESS RECOVERY HR: 102 BPM
BH CV STRESS SPEED STAGE 1: 1.7
BH CV STRESS SPEED STAGE 2: 2.5
BH CV STRESS STAGE 1: 1
BH CV STRESS STAGE 2: 2
BH CV XLRA - RV BASE: 3.3 CM
BH CV XLRA - RV MID: 1.8 CM
MAXIMAL PREDICTED HEART RATE: 150 BPM
PERCENT MAX PREDICTED HR: 119.33 %
STRESS BASELINE BP: NORMAL MMHG
STRESS BASELINE HR: 106 BPM
STRESS PERCENT HR: 140 %
STRESS POST ESTIMATED WORKLOAD: 4.6 METS
STRESS POST EXERCISE DUR MIN: 3 MIN
STRESS POST EXERCISE DUR SEC: 45 SEC
STRESS POST PEAK BP: NORMAL MMHG
STRESS POST PEAK HR: 179 BPM
STRESS TARGET HR: 128 BPM

## 2023-11-14 PROCEDURE — 93880 EXTRACRANIAL BILAT STUDY: CPT

## 2023-11-14 PROCEDURE — 93320 DOPPLER ECHO COMPLETE: CPT

## 2023-11-14 PROCEDURE — 76706 US ABDL AORTA SCREEN AAA: CPT

## 2023-11-14 PROCEDURE — 93017 CV STRESS TEST TRACING ONLY: CPT

## 2023-11-14 PROCEDURE — 93350 STRESS TTE ONLY: CPT

## 2023-11-14 PROCEDURE — 93325 DOPPLER ECHO COLOR FLOW MAPG: CPT

## 2023-11-22 LAB
BH CV ECHO MEAS - ACS: 2.21 CM
BH CV ECHO MEAS - AO MAX PG: 7.4 MMHG
BH CV ECHO MEAS - AO MEAN PG: 3.6 MMHG
BH CV ECHO MEAS - AO ROOT DIAM: 3.5 CM
BH CV ECHO MEAS - AO V2 MAX: 136.1 CM/SEC
BH CV ECHO MEAS - AO V2 VTI: 23.3 CM
BH CV ECHO MEAS - AVA(I,D): 3.8 CM2
BH CV ECHO MEAS - EDV(CUBED): 123.5 ML
BH CV ECHO MEAS - EDV(MOD-SP4): 103.9 ML
BH CV ECHO MEAS - EF(MOD-BP): 69 %
BH CV ECHO MEAS - EF(MOD-SP4): 69 %
BH CV ECHO MEAS - ESV(CUBED): 28.3 ML
BH CV ECHO MEAS - ESV(MOD-SP4): 32.2 ML
BH CV ECHO MEAS - FS: 38.8 %
BH CV ECHO MEAS - IVS/LVPW: 0.98 CM
BH CV ECHO MEAS - IVSD: 1.07 CM
BH CV ECHO MEAS - LA DIMENSION: 3.4 CM
BH CV ECHO MEAS - LV DIASTOLIC VOL/BSA (35-75): 50.9 CM2
BH CV ECHO MEAS - LV MASS(C)D: 201.6 GRAMS
BH CV ECHO MEAS - LV MAX PG: 4.9 MMHG
BH CV ECHO MEAS - LV MEAN PG: 2.37 MMHG
BH CV ECHO MEAS - LV SYSTOLIC VOL/BSA (12-30): 15.8 CM2
BH CV ECHO MEAS - LV V1 MAX: 110.5 CM/SEC
BH CV ECHO MEAS - LV V1 VTI: 20.7 CM
BH CV ECHO MEAS - LVIDD: 5 CM
BH CV ECHO MEAS - LVIDS: 3 CM
BH CV ECHO MEAS - LVOT AREA: 4.2 CM2
BH CV ECHO MEAS - LVOT DIAM: 2.32 CM
BH CV ECHO MEAS - LVPWD: 1.1 CM
BH CV ECHO MEAS - MR MAX PG: 157 MMHG
BH CV ECHO MEAS - MR MAX VEL: 626.4 CM/SEC
BH CV ECHO MEAS - MV A MAX VEL: 107.2 CM/SEC
BH CV ECHO MEAS - MV DEC SLOPE: 321.4 CM/SEC2
BH CV ECHO MEAS - MV DEC TIME: 0.24 SEC
BH CV ECHO MEAS - MV E MAX VEL: 76.2 CM/SEC
BH CV ECHO MEAS - MV E/A: 0.71
BH CV ECHO MEAS - MV MAX PG: 4.7 MMHG
BH CV ECHO MEAS - MV MEAN PG: 2.03 MMHG
BH CV ECHO MEAS - MV V2 VTI: 18.9 CM
BH CV ECHO MEAS - MVA(VTI): 4.6 CM2
BH CV ECHO MEAS - PA ACC TIME: 0.14 SEC
BH CV ECHO MEAS - PA V2 MAX: 216.2 CM/SEC
BH CV ECHO MEAS - PI END-D VEL: 138.9 CM/SEC
BH CV ECHO MEAS - PULM A REVS DUR: 0.1 SEC
BH CV ECHO MEAS - PULM A REVS VEL: 30.3 CM/SEC
BH CV ECHO MEAS - PULM DIAS VEL: 56.7 CM/SEC
BH CV ECHO MEAS - PULM S/D: 1
BH CV ECHO MEAS - PULM SYS VEL: 56.9 CM/SEC
BH CV ECHO MEAS - QP/QS: 0.66
BH CV ECHO MEAS - RAP SYSTOLE: 10 MMHG
BH CV ECHO MEAS - RV MAX PG: 4.2 MMHG
BH CV ECHO MEAS - RV V1 MAX: 102.4 CM/SEC
BH CV ECHO MEAS - RV V1 VTI: 18.8 CM
BH CV ECHO MEAS - RVOT DIAM: 1.98 CM
BH CV ECHO MEAS - RVSP: 56.4 MMHG
BH CV ECHO MEAS - SI(MOD-SP4): 35.1 ML/M2
BH CV ECHO MEAS - SV(LVOT): 87.5 ML
BH CV ECHO MEAS - SV(MOD-SP4): 71.7 ML
BH CV ECHO MEAS - SV(RVOT): 57.8 ML
BH CV ECHO MEAS - TAPSE (>1.6): 2.2 CM
BH CV ECHO MEAS - TR MAX PG: 46.4 MMHG
BH CV ECHO MEAS - TR MAX VEL: 343.3 CM/SEC
BH CV STRESS BP STAGE 1: NORMAL
BH CV STRESS BP STAGE 2: NORMAL
BH CV STRESS DURATION MIN STAGE 1: 3
BH CV STRESS DURATION MIN STAGE 2: 0
BH CV STRESS DURATION SEC STAGE 1: 0
BH CV STRESS DURATION SEC STAGE 2: 45
BH CV STRESS ECHO POST STRESS EJECTION FRACTION EF: 75 %
BH CV STRESS GRADE STAGE 1: 10
BH CV STRESS GRADE STAGE 2: 12
BH CV STRESS HR STAGE 1: 143
BH CV STRESS HR STAGE 2: 160
BH CV STRESS METS STAGE 1: 5
BH CV STRESS METS STAGE 2: 7.5
BH CV STRESS PROTOCOL 1: NORMAL
BH CV STRESS RECOVERY BP: NORMAL MMHG
BH CV STRESS RECOVERY HR: 102 BPM
BH CV STRESS SPEED STAGE 1: 1.7
BH CV STRESS SPEED STAGE 2: 2.5
BH CV STRESS STAGE 1: 1
BH CV STRESS STAGE 2: 2
BH CV XLRA - RV BASE: 3.3 CM
BH CV XLRA - RV MID: 1.8 CM
MAXIMAL PREDICTED HEART RATE: 150 BPM
PERCENT MAX PREDICTED HR: 119.33 %
STRESS BASELINE BP: NORMAL MMHG
STRESS BASELINE HR: 106 BPM
STRESS PERCENT HR: 140 %
STRESS POST ESTIMATED WORKLOAD: 4.6 METS
STRESS POST EXERCISE DUR MIN: 3 MIN
STRESS POST EXERCISE DUR SEC: 45 SEC
STRESS POST PEAK BP: NORMAL MMHG
STRESS POST PEAK HR: 179 BPM
STRESS TARGET HR: 128 BPM

## 2023-12-04 DIAGNOSIS — I10 ESSENTIAL HYPERTENSION: Primary | ICD-10-CM

## 2023-12-04 DIAGNOSIS — R00.0 TACHYCARDIA: ICD-10-CM

## 2023-12-06 NOTE — PROGRESS NOTES
Date of Office Visit: 2023  Encounter Provider: Dr. Lei Wall  Place of Service: [facility] CARDIOLOGY [place]  Patient Name: Emanuel Alcantara  :1950  Monty Henry MD    Chief Complaint   Patient presents with    Hypertension    Hyperlipidemia    Consult     History of Present Illness:    I am pleased to see Mr. Alcantara in my office today as a new consultation.    As you know, patient is 72 years old white gentleman whose past medical history significant for hypertension, hyperlipidemia, Guillain-Barré syndrome who is referred to me for difficult to control hypertension.    In , patient was diagnosed with Guillain-Barré syndrome and developed significant weakness.  However patient improved and weakness got better with physical therapy.  Patient did not require immunotherapy or immunoglobulins of plasmapheresis.  Since then, patient developed labile hypertension.  In 2023, patient underwent stress test in which he walked for total of 3 minutes and 45 minutes and showed no ischemia.  Echocardiographic images showed no wall motion abnormality.  No significant valvular heart disease noted.    Patient blood pressure recently has been very high.  Patient denies any symptom of chest pain.  Patient does complain of shortness of breath.  Patient denies any orthopnea, PND, syncope or presyncope.    Patient does not have previous history of CAD, PCI or MI.  He does not smoke or abuse alcohol.    EKG showed normal sinus rhythm.    At this stage I would recommend that patient should proceed with losartan 25 mg twice daily continue Toprol-XL.  Patient had stress test which was probably nondiagnostic and submaximal.  Patient may need stress test in future.        Past Medical History:   Diagnosis Date    Guillain-[place] 2022    Neurologist, Dr. John Jeong    Hyperlipidemia     Hypertension     IBS (irritable bowel syndrome)          History reviewed. No pertinent surgical  history.        Current Outpatient Medications:     aspirin 81 MG EC tablet, Take 1 tablet by mouth 3 (Three) Times a Week. Every other day, Disp: , Rfl:     metoprolol succinate XL (TOPROL-XL) 100 MG 24 hr tablet, Take 1 tablet by mouth Daily., Disp: 90 tablet, Rfl: 3    rosuvastatin (CRESTOR) 5 MG tablet, Take 1 tablet by mouth Daily., Disp: 90 tablet, Rfl: 3    losartan (Cozaar) 25 MG tablet, Take 1 tablet by mouth 2 (Two) Times a Day., Disp: 180 tablet, Rfl: 1      Social History     Socioeconomic History    Marital status:    Tobacco Use    Smoking status: Never     Passive exposure: Never    Smokeless tobacco: Never   Vaping Use    Vaping Use: Never used   Substance and Sexual Activity    Alcohol use: Not Currently    Drug use: Never    Sexual activity: Defer         Review of Systems   Constitutional: Negative for chills and fever.   HENT:  Negative for ear discharge and nosebleeds.    Eyes:  Negative for discharge and redness.   Cardiovascular:  Negative for chest pain, orthopnea, palpitations, paroxysmal nocturnal dyspnea and syncope.   Respiratory:  Positive for shortness of breath. Negative for cough and wheezing.    Endocrine: Negative for heat intolerance.   Skin:  Negative for rash.   Musculoskeletal:  Negative for arthritis and myalgias.   Gastrointestinal:  Negative for abdominal pain, melena, nausea and vomiting.   Genitourinary:  Negative for dysuria and hematuria.   Neurological:  Negative for dizziness, light-headedness, numbness and tremors.   Psychiatric/Behavioral:  Negative for depression. The patient is not nervous/anxious.        Procedures      ECG 12 Lead    Date/Time: 12/8/2023 3:30 PM  Performed by: Lei Wall MD    Authorized by: Lei Wall MD  Previous ECG: no previous ECG available  Rhythm: sinus rhythm    Clinical impression: normal ECG          ECG 12 Lead    (Results Pending)           Objective:    BP (!) 182/110 (BP Location: Right arm, Patient Position: Sitting,  "Cuff Size: Large Adult)   Pulse 80   Resp 18   Ht 182.9 cm (72.01\")   Wt 79.8 kg (176 lb)   SpO2 98%   BMI 23.86 kg/m²         Constitutional:       Appearance: Well-developed.   Eyes:      General: No scleral icterus.        Right eye: No discharge.   HENT:      Head: Normocephalic and atraumatic.   Neck:      Thyroid: No thyromegaly.      Lymphadenopathy: No cervical adenopathy.   Pulmonary:      Effort: Pulmonary effort is normal. No respiratory distress.      Breath sounds: Normal breath sounds. No wheezing. No rales.   Cardiovascular:      Normal rate. Regular rhythm.      No gallop.    Edema:     Peripheral edema absent.   Abdominal:      Tenderness: There is no abdominal tenderness.   Skin:     Findings: No erythema or rash.   Neurological:      Mental Status: Alert and oriented to person, place, and time.             Assessment:       Diagnosis Plan   1. Essential hypertension  ECG 12 Lead    losartan (Cozaar) 25 MG tablet      2. Mixed hyperlipidemia  ECG 12 Lead    losartan (Cozaar) 25 MG tablet      3. Guillain Barré syndrome  losartan (Cozaar) 25 MG tablet      4. Shortness of breath  losartan (Cozaar) 25 MG tablet               Plan:       MDM:    1.  Hypertension:    Start losartan 25 mg twice daily    2.  Shortness of breath:    Patient had stress echocardiography which was nondiagnostic.  Consider stress test in future    3.  Hyperlipidemia:    Patient is on lovastatin repeat lipid panel testing    4.  Guillain-Barré syndrome:    Patient symptoms are contained patient follows with neurology at [facility].  "

## 2023-12-08 ENCOUNTER — OFFICE VISIT (OUTPATIENT)
Dept: CARDIOLOGY | Facility: CLINIC | Age: 73
End: 2023-12-08
Payer: MEDICARE

## 2023-12-08 VITALS
SYSTOLIC BLOOD PRESSURE: 182 MMHG | WEIGHT: 176 LBS | HEART RATE: 80 BPM | BODY MASS INDEX: 23.84 KG/M2 | OXYGEN SATURATION: 98 % | HEIGHT: 72 IN | DIASTOLIC BLOOD PRESSURE: 110 MMHG | RESPIRATION RATE: 18 BRPM

## 2023-12-08 DIAGNOSIS — R06.02 SHORTNESS OF BREATH: ICD-10-CM

## 2023-12-08 DIAGNOSIS — I10 ESSENTIAL HYPERTENSION: Primary | ICD-10-CM

## 2023-12-08 DIAGNOSIS — G61.0 GUILLAIN BARRÉ SYNDROME: ICD-10-CM

## 2023-12-08 DIAGNOSIS — E78.2 MIXED HYPERLIPIDEMIA: ICD-10-CM

## 2023-12-08 PROCEDURE — 3077F SYST BP >= 140 MM HG: CPT | Performed by: INTERNAL MEDICINE

## 2023-12-08 PROCEDURE — 93000 ELECTROCARDIOGRAM COMPLETE: CPT | Performed by: INTERNAL MEDICINE

## 2023-12-08 PROCEDURE — 1159F MED LIST DOCD IN RCRD: CPT | Performed by: INTERNAL MEDICINE

## 2023-12-08 PROCEDURE — 3080F DIAST BP >= 90 MM HG: CPT | Performed by: INTERNAL MEDICINE

## 2023-12-08 PROCEDURE — 1160F RVW MEDS BY RX/DR IN RCRD: CPT | Performed by: INTERNAL MEDICINE

## 2023-12-08 PROCEDURE — 99204 OFFICE O/P NEW MOD 45 MIN: CPT | Performed by: INTERNAL MEDICINE

## 2023-12-08 RX ORDER — LOSARTAN POTASSIUM 25 MG/1
25 TABLET ORAL 2 TIMES DAILY
Qty: 180 TABLET | Refills: 1 | Status: SHIPPED | OUTPATIENT
Start: 2023-12-08

## 2024-02-12 DIAGNOSIS — I10 ESSENTIAL HYPERTENSION: ICD-10-CM

## 2024-02-12 RX ORDER — METOPROLOL SUCCINATE 100 MG/1
100 TABLET, EXTENDED RELEASE ORAL DAILY
Qty: 90 TABLET | Refills: 0 | Status: SHIPPED | OUTPATIENT
Start: 2024-02-12

## 2024-02-16 ENCOUNTER — OFFICE VISIT (OUTPATIENT)
Dept: CARDIOLOGY | Facility: CLINIC | Age: 74
End: 2024-02-16
Payer: MEDICARE

## 2024-02-16 VITALS
DIASTOLIC BLOOD PRESSURE: 70 MMHG | WEIGHT: 187 LBS | SYSTOLIC BLOOD PRESSURE: 139 MMHG | BODY MASS INDEX: 25.33 KG/M2 | HEART RATE: 93 BPM | HEIGHT: 72 IN | OXYGEN SATURATION: 100 % | RESPIRATION RATE: 16 BRPM

## 2024-02-16 DIAGNOSIS — I10 ESSENTIAL HYPERTENSION: ICD-10-CM

## 2024-02-16 DIAGNOSIS — E78.2 MIXED HYPERLIPIDEMIA: ICD-10-CM

## 2024-02-16 DIAGNOSIS — G61.0 GUILLAIN BARRÉ SYNDROME: ICD-10-CM

## 2024-02-16 DIAGNOSIS — R06.02 SHORTNESS OF BREATH: ICD-10-CM

## 2024-02-16 PROCEDURE — 1160F RVW MEDS BY RX/DR IN RCRD: CPT | Performed by: INTERNAL MEDICINE

## 2024-02-16 PROCEDURE — 3075F SYST BP GE 130 - 139MM HG: CPT | Performed by: INTERNAL MEDICINE

## 2024-02-16 PROCEDURE — 99214 OFFICE O/P EST MOD 30 MIN: CPT | Performed by: INTERNAL MEDICINE

## 2024-02-16 PROCEDURE — 3078F DIAST BP <80 MM HG: CPT | Performed by: INTERNAL MEDICINE

## 2024-02-16 PROCEDURE — 1159F MED LIST DOCD IN RCRD: CPT | Performed by: INTERNAL MEDICINE

## 2024-02-16 RX ORDER — LOSARTAN POTASSIUM 25 MG/1
25 TABLET ORAL 2 TIMES DAILY
Qty: 180 TABLET | Refills: 1 | Status: SHIPPED | OUTPATIENT
Start: 2024-02-16

## 2024-04-17 ENCOUNTER — TELEPHONE (OUTPATIENT)
Dept: FAMILY MEDICINE CLINIC | Facility: CLINIC | Age: 74
End: 2024-04-17
Payer: MEDICARE

## 2024-04-17 DIAGNOSIS — S82.839G: Primary | ICD-10-CM

## 2024-04-17 NOTE — TELEPHONE ENCOUNTER
You don't have anything available. I looked at available Drs tomorrow and there is no openings in office until Friday. Are you okay with me placing ortho referral

## 2024-04-17 NOTE — TELEPHONE ENCOUNTER
Patient seen at [facility]. Proximal fibula fracture. May need surgery. Does patient need to be seen here first in order to get a referral to ortho?

## 2024-04-18 ENCOUNTER — TELEPHONE (OUTPATIENT)
Dept: CARDIOLOGY | Facility: CLINIC | Age: 74
End: 2024-04-18
Payer: MEDICARE

## 2024-04-18 NOTE — TELEPHONE ENCOUNTER
Caller: Emanuel Alcantara    Relationship: Self    Best call back number: [phone]    What is the best time to reach you: ANYTIME     Who are you requesting to speak with (clinical staff, provider,  specific staff member): CLINICAL         What was the call regarding: PATIENT HAS LOG OF BLOOD PRESSURE AND WANTED TO LET OFF KNOW HE HAS THAT TO TURN IN.HOWEVER HE BROKE HIS FOOT AND IS UNABLE TO DROP IT OFF RIGHT NOW.     Is it okay if the provider responds through MyChart: NO

## 2024-04-19 ENCOUNTER — TELEPHONE (OUTPATIENT)
Dept: FAMILY MEDICINE CLINIC | Facility: CLINIC | Age: 74
End: 2024-04-19
Payer: MEDICARE

## 2024-04-19 NOTE — TELEPHONE ENCOUNTER
Called Dr. Shin office and asked them if they had any available sooner appointments, and per Marlene Shin does not at this time. Instructed patient to call Dr. Shin office again on Monday.

## 2024-05-15 DIAGNOSIS — I10 ESSENTIAL HYPERTENSION: ICD-10-CM

## 2024-05-15 RX ORDER — METOPROLOL SUCCINATE 100 MG/1
100 TABLET, EXTENDED RELEASE ORAL DAILY
Qty: 90 TABLET | Refills: 0 | Status: SHIPPED | OUTPATIENT
Start: 2024-05-15

## 2024-05-20 DIAGNOSIS — I10 ESSENTIAL HYPERTENSION: ICD-10-CM

## 2024-05-20 RX ORDER — METOPROLOL SUCCINATE 100 MG/1
100 TABLET, EXTENDED RELEASE ORAL DAILY
Qty: 90 TABLET | Refills: 0 | OUTPATIENT
Start: 2024-05-20

## 2024-06-21 ENCOUNTER — TELEPHONE (OUTPATIENT)
Dept: FAMILY MEDICINE CLINIC | Facility: CLINIC | Age: 74
End: 2024-06-21
Payer: MEDICARE

## 2024-06-21 DIAGNOSIS — G62.9 NEUROPATHY: Primary | ICD-10-CM

## 2024-06-21 DIAGNOSIS — R26.9 ABNORMAL GAIT: ICD-10-CM

## 2024-06-21 NOTE — TELEPHONE ENCOUNTER
Per last office note, PT could be considered if symptoms did not improve had seen neurology in the past and been worked up. Placed referral for neuropathy and abnormal gait

## 2024-06-21 NOTE — TELEPHONE ENCOUNTER
Caller: Emanuel Alcantara    Relationship: Self    Best call back number: [phone]    What is the medical concern/diagnosis: NEUROPATHY     What specialty or service is being requested: PHYSICAL THERAPY    What is the provider, practice or medical service name: WHOEVER IS RECOMMENDED

## 2024-08-12 DIAGNOSIS — I10 ESSENTIAL HYPERTENSION: ICD-10-CM

## 2024-08-12 RX ORDER — METOPROLOL SUCCINATE 100 MG/1
100 TABLET, EXTENDED RELEASE ORAL DAILY
Qty: 90 TABLET | Refills: 0 | Status: SHIPPED | OUTPATIENT
Start: 2024-08-12

## 2024-08-16 ENCOUNTER — TRANSCRIBE ORDERS (OUTPATIENT)
Dept: ADMINISTRATIVE | Facility: HOSPITAL | Age: 74
End: 2024-08-16
Payer: MEDICARE

## 2024-08-16 DIAGNOSIS — G61.0 GUILLAIN-BARRE SYNDROME: Primary | ICD-10-CM

## 2024-08-21 NOTE — PROGRESS NOTES
Date of Office Visit: 2024  Encounter Provider: Dr. Lei Wall  Place of Service: [facility] CARDIOLOGY [place]  Patient Name: Emanuel Alcantara  :1950  Monty Henry MD    Chief Complaint   Patient presents with    Hypertension    Hyperlipidemia    Follow-up     History of Present Illness    I am pleased to see Mr. Alcantara in my office today as a as a follow-up    As you know, patient is 73 years old white gentleman whose past medical history significant for hypertension, hyperlipidemia, Guillain-Barré syndrome who came today for follow-up.    In , patient was diagnosed with Guillain-Barré syndrome and developed significant weakness.  However patient improved and weakness got better with physical therapy.  Patient did not require immunotherapy or immunoglobulins or plasmapheresis.  Since then, patient developed labile hypertension.  In 2023, patient underwent stress test in which he walked for total of 3 minutes and 45 minutes and showed no ischemia.  Echocardiographic images showed no wall motion abnormality.  No significant valvular heart disease noted.    Patient came today for follow-up.  Unfortunately patient fell down and tripped of an object and broke right leg.  Patient has healed well.  Patient weakness had gotten worse.  Patient is being treated with immunoglobulins and he is going to start in the near future.  Patient would get these infusion at cancer center at [facility].    Patient denies any symptom of chest pain or tightness or heaviness.  No orthopnea PND no syncope or presyncope.    EKG showed sinus tachycardia.    Patient is overall doing well.  His blood pressure is elevated.  Patient has whitecoat hypertension.  Patient brought the blood pressure log book and all blood pressure readings are within desirable range I would not start any new medication or change antihypertensive regiment continue monitor the blood pressure.      Past Medical History:    Diagnosis Date    Guillain-[place] 02/2022    Neurologist, Dr. John Jeong    Hyperlipidemia     Hypertension     IBS (irritable bowel syndrome)          History reviewed. No pertinent surgical history.        Current Outpatient Medications:     aspirin 81 MG EC tablet, Take 1 tablet by mouth 3 (Three) Times a Week. Every other day, Disp: , Rfl:     levothyroxine (SYNTHROID, LEVOTHROID) 25 MCG tablet, Take 1 tablet by mouth Every Morning., Disp: , Rfl:     losartan (Cozaar) 25 MG tablet, Take 1 tablet by mouth 2 (Two) Times a Day., Disp: 180 tablet, Rfl: 1    metoprolol succinate XL (TOPROL-XL) 100 MG 24 hr tablet, Take 1 tablet by mouth once daily, Disp: 90 tablet, Rfl: 0    rosuvastatin (CRESTOR) 5 MG tablet, Take 1 tablet by mouth Daily., Disp: 90 tablet, Rfl: 3      Social History     Socioeconomic History    Marital status:    Tobacco Use    Smoking status: Never     Passive exposure: Never    Smokeless tobacco: Never   Vaping Use    Vaping status: Never Used   Substance and Sexual Activity    Alcohol use: Not Currently    Drug use: Never    Sexual activity: Defer         Review of Systems   Constitutional: Negative for chills and fever.   HENT:  Negative for ear discharge and nosebleeds.    Eyes:  Negative for discharge and redness.   Cardiovascular:  Negative for chest pain, orthopnea, palpitations, paroxysmal nocturnal dyspnea and syncope.   Respiratory:  Negative for cough, shortness of breath and wheezing.    Endocrine: Negative for heat intolerance.   Skin:  Negative for rash.   Musculoskeletal:  Negative for arthritis and myalgias.   Gastrointestinal:  Negative for abdominal pain, melena, nausea and vomiting.   Genitourinary:  Negative for dysuria and hematuria.   Neurological:  Negative for dizziness, light-headedness, numbness and tremors.   Psychiatric/Behavioral:  Negative for depression. The patient is not nervous/anxious.        Procedures      ECG 12 Lead    Date/Time: 8/23/2024 11:55  "AM  Performed by: Lei Wall MD    Authorized by: Lei Wall MD  Comparison: compared with previous ECG   Similar to previous ECG  Rhythm: sinus rhythm    Clinical impression: normal ECG          ECG 12 Lead    (Results Pending)           Objective:    /80   Pulse 102   Resp 16   Ht 182.9 cm (72.01\")   Wt 83.9 kg (185 lb)   SpO2 98%   BMI 25.08 kg/m²         Constitutional:       Appearance: Well-developed.   Eyes:      General: No scleral icterus.        Right eye: No discharge.   HENT:      Head: Normocephalic and atraumatic.   Neck:      Thyroid: No thyromegaly.      Lymphadenopathy: No cervical adenopathy.   Pulmonary:      Effort: Pulmonary effort is normal. No respiratory distress.      Breath sounds: Normal breath sounds. No wheezing. No rales.   Cardiovascular:      Normal rate. Regular rhythm.      No gallop.    Edema:     Peripheral edema absent.   Abdominal:      Tenderness: There is no abdominal tenderness.   Skin:     Findings: No erythema or rash.   Neurological:      Mental Status: Alert and oriented to person, place, and time.             Assessment:       Diagnosis Plan   1. Essential hypertension  ECG 12 Lead      2. Mixed hyperlipidemia  ECG 12 Lead      3. Guillain Barré syndrome        4. Fall, subsequent encounter                 Plan:       MDM:    1.  Hypertension:    Patient has labile hypertension but all blood pressure reading at home are within desirable range I would recommend observation continue current treatment    2.  Guillain-Barré syndrome:    Patient is being considered for immunotherapy infusion.  Further recommendation as per neurologist Dr. Wynn    3.  Mixed hyperlipidemia:    Patient is on Crestor continue current treatment repeat lipid panel testing    4.  Fall:    Most likely fall is because of tripping and weakness of the extremities.  Fall precautions are discussed with the patient  "

## 2024-08-23 ENCOUNTER — OFFICE VISIT (OUTPATIENT)
Dept: CARDIOLOGY | Facility: CLINIC | Age: 74
End: 2024-08-23
Payer: MEDICARE

## 2024-08-23 VITALS
HEART RATE: 102 BPM | WEIGHT: 185 LBS | OXYGEN SATURATION: 98 % | RESPIRATION RATE: 16 BRPM | HEIGHT: 72 IN | SYSTOLIC BLOOD PRESSURE: 131 MMHG | DIASTOLIC BLOOD PRESSURE: 80 MMHG | BODY MASS INDEX: 25.06 KG/M2
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
## 2024-08-23 DIAGNOSIS — G61.0 GUILLAIN BARRÉ SYNDROME: Chronic | ICD-10-CM

## 2024-08-23 DIAGNOSIS — E78.2 MIXED HYPERLIPIDEMIA: ICD-10-CM

## 2024-08-23 DIAGNOSIS — I10 ESSENTIAL HYPERTENSION: Primary | ICD-10-CM

## 2024-08-23 RX ORDER — LEVOTHYROXINE SODIUM 0.03 MG/1
25 TABLET ORAL
COMMUNITY

## 2024-08-30 PROBLEM — G61.0 GUILLAIN-BARRE SYNDROME: Status: ACTIVE | Noted: 2024-08-30

## 2024-08-30 RX ORDER — FAMOTIDINE 10 MG/ML
20 INJECTION, SOLUTION INTRAVENOUS AS NEEDED
Status: CANCELLED | OUTPATIENT
Start: 2024-09-03

## 2024-08-30 RX ORDER — DIPHENHYDRAMINE HYDROCHLORIDE 50 MG/ML
25 INJECTION INTRAMUSCULAR; INTRAVENOUS AS NEEDED
Status: CANCELLED | OUTPATIENT
Start: 2024-09-03

## 2024-08-30 RX ORDER — SODIUM CHLORIDE 9 MG/ML
20 INJECTION, SOLUTION INTRAVENOUS ONCE
Status: CANCELLED | OUTPATIENT
Start: 2024-09-03

## 2024-09-04 ENCOUNTER — TRANSCRIBE ORDERS (OUTPATIENT)
Dept: ADMINISTRATIVE | Facility: HOSPITAL | Age: 74
End: 2024-09-04
Payer: MEDICARE

## 2024-09-04 DIAGNOSIS — G61.0 GUILLAIN-BARRE SYNDROME: Primary | ICD-10-CM

## 2024-09-09 ENCOUNTER — HOSPITAL ENCOUNTER (OUTPATIENT)
Dept: ONCOLOGY | Facility: HOSPITAL | Age: 74
Discharge: HOME | End: 2024-09-09
Admitting: PSYCHIATRY & NEUROLOGY
Payer: MEDICARE

## 2024-09-09 VITALS
HEIGHT: 72 IN | DIASTOLIC BLOOD PRESSURE: 94 MMHG | BODY MASS INDEX: 24.98 KG/M2 | OXYGEN SATURATION: 100 % | TEMPERATURE: 98.2 F | SYSTOLIC BLOOD PRESSURE: 165 MMHG | RESPIRATION RATE: 16 BRPM | WEIGHT: 184.4 LBS | HEART RATE: 89 BPM

## 2024-09-09 DIAGNOSIS — G61.0 GUILLAIN-BARRE SYNDROME: Primary | ICD-10-CM

## 2024-09-09 PROCEDURE — 25010000002 IMMUNE GLOBULIN (HUMAN) 40 GM/400ML SOLUTION: Performed by: PSYCHIATRY & NEUROLOGY

## 2024-09-09 PROCEDURE — 96365 THER/PROPH/DIAG IV INF INIT: CPT

## 2024-09-09 PROCEDURE — 96366 THER/PROPH/DIAG IV INF ADDON: CPT

## 2024-09-09 PROCEDURE — 25810000003 SODIUM CHLORIDE 0.9 % SOLUTION: Performed by: PSYCHIATRY & NEUROLOGY

## 2024-09-09 RX ORDER — SODIUM CHLORIDE 9 MG/ML
20 INJECTION, SOLUTION INTRAVENOUS ONCE
Status: COMPLETED | OUTPATIENT
Start: 2024-09-09 | End: 2024-09-09

## 2024-09-09 RX ORDER — DIPHENHYDRAMINE HYDROCHLORIDE 50 MG/ML
25 INJECTION INTRAMUSCULAR; INTRAVENOUS AS NEEDED
Status: CANCELLED | OUTPATIENT
Start: 2024-09-10

## 2024-09-09 RX ORDER — SODIUM CHLORIDE 9 MG/ML
20 INJECTION, SOLUTION INTRAVENOUS ONCE
Status: CANCELLED | OUTPATIENT
Start: 2024-09-10

## 2024-09-09 RX ORDER — FAMOTIDINE 10 MG/ML
20 INJECTION, SOLUTION INTRAVENOUS AS NEEDED
Status: CANCELLED | OUTPATIENT
Start: 2024-09-10

## 2024-09-09 RX ADMIN — SODIUM CHLORIDE 20 ML/HR: 9 INJECTION, SOLUTION INTRAVENOUS at 09:41

## 2024-09-09 RX ADMIN — IMMUNE GLOBULIN (HUMAN) 40 G: 10 INJECTION INTRAVENOUS; SUBCUTANEOUS at 09:42

## 2024-09-09 NOTE — PROGRESS NOTES
Pt here for treatment 1 IVIG. Peripheral IV access obtained and infusion given without difficulty. Pt tolerated it well. He will return to center tomorrow, AVS given and pt verbalized understanding.

## 2024-09-10 ENCOUNTER — HOSPITAL ENCOUNTER (OUTPATIENT)
Dept: ONCOLOGY | Facility: HOSPITAL | Age: 74
Discharge: HOME | End: 2024-09-10
Admitting: PSYCHIATRY & NEUROLOGY
Payer: MEDICARE

## 2024-09-10 VITALS
BODY MASS INDEX: 24.95 KG/M2 | OXYGEN SATURATION: 100 % | HEIGHT: 72 IN | DIASTOLIC BLOOD PRESSURE: 84 MMHG | RESPIRATION RATE: 20 BRPM | HEART RATE: 96 BPM | WEIGHT: 184.2 LBS | TEMPERATURE: 98.4 F | SYSTOLIC BLOOD PRESSURE: 149 MMHG

## 2024-09-10 DIAGNOSIS — G61.0 GUILLAIN-BARRE SYNDROME: Primary | ICD-10-CM

## 2024-09-10 PROCEDURE — 96365 THER/PROPH/DIAG IV INF INIT: CPT

## 2024-09-10 PROCEDURE — 25810000003 SODIUM CHLORIDE 0.9 % SOLUTION: Performed by: PSYCHIATRY & NEUROLOGY

## 2024-09-10 PROCEDURE — 96366 THER/PROPH/DIAG IV INF ADDON: CPT

## 2024-09-10 PROCEDURE — 25010000002 IMMUNE GLOBULIN (HUMAN) 40 GM/400ML SOLUTION: Performed by: PSYCHIATRY & NEUROLOGY

## 2024-09-10 RX ORDER — SODIUM CHLORIDE 9 MG/ML
20 INJECTION, SOLUTION INTRAVENOUS ONCE
Status: COMPLETED | OUTPATIENT
Start: 2024-09-10 | End: 2024-09-10

## 2024-09-10 RX ORDER — FAMOTIDINE 10 MG/ML
20 INJECTION, SOLUTION INTRAVENOUS AS NEEDED
Status: CANCELLED | OUTPATIENT
Start: 2024-09-11

## 2024-09-10 RX ORDER — DIPHENHYDRAMINE HYDROCHLORIDE 50 MG/ML
25 INJECTION INTRAMUSCULAR; INTRAVENOUS AS NEEDED
Status: CANCELLED | OUTPATIENT
Start: 2024-09-11

## 2024-09-10 RX ORDER — SODIUM CHLORIDE 9 MG/ML
20 INJECTION, SOLUTION INTRAVENOUS ONCE
Status: CANCELLED | OUTPATIENT
Start: 2024-09-11

## 2024-09-10 RX ADMIN — SODIUM CHLORIDE 20 ML/HR: 9 INJECTION, SOLUTION INTRAVENOUS at 10:50

## 2024-09-10 RX ADMIN — IMMUNE GLOBULIN (HUMAN) 40 G: 10 INJECTION INTRAVENOUS; SUBCUTANEOUS at 10:52

## 2024-09-10 NOTE — PROGRESS NOTES
Pt here for IVIB per Dr Wynn, peripheral IV started with blood return noted,  treatment administered per MAR and pt tolerated treatment well,  pt discharged with spouse and has appt for 9/11/24.

## 2024-09-11 ENCOUNTER — HOSPITAL ENCOUNTER (OUTPATIENT)
Dept: ONCOLOGY | Facility: HOSPITAL | Age: 74
Discharge: HOME | End: 2024-09-11
Admitting: PSYCHIATRY & NEUROLOGY
Payer: MEDICARE

## 2024-09-11 VITALS
BODY MASS INDEX: 25.19 KG/M2 | WEIGHT: 186 LBS | DIASTOLIC BLOOD PRESSURE: 86 MMHG | RESPIRATION RATE: 18 BRPM | OXYGEN SATURATION: 98 % | HEIGHT: 72 IN | TEMPERATURE: 98.2 F | HEART RATE: 70 BPM | SYSTOLIC BLOOD PRESSURE: 156 MMHG

## 2024-09-11 DIAGNOSIS — G61.0 GUILLAIN-BARRE SYNDROME: Primary | ICD-10-CM

## 2024-09-11 PROCEDURE — 96366 THER/PROPH/DIAG IV INF ADDON: CPT

## 2024-09-11 PROCEDURE — 25010000002 IMMUNE GLOBULIN (HUMAN) 40 GM/400ML SOLUTION: Performed by: PSYCHIATRY & NEUROLOGY

## 2024-09-11 PROCEDURE — 25810000003 SODIUM CHLORIDE 0.9 % SOLUTION: Performed by: PSYCHIATRY & NEUROLOGY

## 2024-09-11 PROCEDURE — 96365 THER/PROPH/DIAG IV INF INIT: CPT

## 2024-09-11 RX ORDER — SODIUM CHLORIDE 9 MG/ML
20 INJECTION, SOLUTION INTRAVENOUS ONCE
Status: COMPLETED | OUTPATIENT
Start: 2024-09-11 | End: 2024-09-11

## 2024-09-11 RX ORDER — DIPHENHYDRAMINE HYDROCHLORIDE 50 MG/ML
25 INJECTION INTRAMUSCULAR; INTRAVENOUS AS NEEDED
Status: CANCELLED | OUTPATIENT
Start: 2024-09-12

## 2024-09-11 RX ORDER — SODIUM CHLORIDE 9 MG/ML
20 INJECTION, SOLUTION INTRAVENOUS ONCE
Status: CANCELLED | OUTPATIENT
Start: 2024-09-12

## 2024-09-11 RX ORDER — FAMOTIDINE 10 MG/ML
20 INJECTION, SOLUTION INTRAVENOUS AS NEEDED
Status: CANCELLED | OUTPATIENT
Start: 2024-09-12

## 2024-09-11 RX ADMIN — SODIUM CHLORIDE 20 ML/HR: 9 INJECTION, SOLUTION INTRAVENOUS at 13:10

## 2024-09-11 RX ADMIN — IMMUNE GLOBULIN (HUMAN) 40 G: 10 INJECTION INTRAVENOUS; SUBCUTANEOUS at 13:14

## 2024-09-11 NOTE — PROGRESS NOTES
Patient is here for Day 3 IVIG. Patient stated no changes in his assessment except after the first dose he noticed more stiffness in his ankles that night but it was gone the next morning and after the second treament he noticed a little bit more stiffness in his ankles but it was less that day 1 and returned to normal the next morning. Patient tolerated treatment and declined AVS. Patient aware of appt tomorrow.

## 2024-09-12 ENCOUNTER — HOSPITAL ENCOUNTER (OUTPATIENT)
Dept: ONCOLOGY | Facility: HOSPITAL | Age: 74
Discharge: HOME | End: 2024-09-12
Payer: MEDICARE

## 2024-09-12 VITALS
SYSTOLIC BLOOD PRESSURE: 164 MMHG | DIASTOLIC BLOOD PRESSURE: 91 MMHG | HEIGHT: 72 IN | TEMPERATURE: 97.4 F | BODY MASS INDEX: 24.99 KG/M2 | HEART RATE: 76 BPM | WEIGHT: 184.5 LBS | OXYGEN SATURATION: 95 % | RESPIRATION RATE: 18 BRPM

## 2024-09-12 DIAGNOSIS — G61.0 GUILLAIN-BARRE SYNDROME: Primary | ICD-10-CM

## 2024-09-12 PROCEDURE — 96366 THER/PROPH/DIAG IV INF ADDON: CPT

## 2024-09-12 PROCEDURE — 25010000002 IMMUNE GLOBULIN (HUMAN) 40 GM/400ML SOLUTION: Performed by: PSYCHIATRY & NEUROLOGY

## 2024-09-12 PROCEDURE — 25810000003 SODIUM CHLORIDE 0.9 % SOLUTION: Performed by: PSYCHIATRY & NEUROLOGY

## 2024-09-12 PROCEDURE — 96365 THER/PROPH/DIAG IV INF INIT: CPT

## 2024-09-12 RX ORDER — SODIUM CHLORIDE 9 MG/ML
20 INJECTION, SOLUTION INTRAVENOUS ONCE
Status: COMPLETED | OUTPATIENT
Start: 2024-09-12 | End: 2024-09-12

## 2024-09-12 RX ORDER — DIPHENHYDRAMINE HYDROCHLORIDE 50 MG/ML
25 INJECTION INTRAMUSCULAR; INTRAVENOUS AS NEEDED
OUTPATIENT
Start: 2024-09-12

## 2024-09-12 RX ORDER — SODIUM CHLORIDE 9 MG/ML
20 INJECTION, SOLUTION INTRAVENOUS ONCE
Status: CANCELLED | OUTPATIENT
Start: 2024-09-12

## 2024-09-12 RX ORDER — FAMOTIDINE 10 MG/ML
20 INJECTION, SOLUTION INTRAVENOUS AS NEEDED
OUTPATIENT
Start: 2024-09-12

## 2024-09-12 RX ADMIN — SODIUM CHLORIDE 20 ML/HR: 9 INJECTION, SOLUTION INTRAVENOUS at 13:33

## 2024-09-12 RX ADMIN — IMMUNE GLOBULIN (HUMAN) 40 G: 10 INJECTION INTRAVENOUS; SUBCUTANEOUS at 13:33

## 2024-10-03 ENCOUNTER — TELEPHONE (OUTPATIENT)
Dept: FAMILY MEDICINE CLINIC | Facility: CLINIC | Age: 74
End: 2024-10-03
Payer: MEDICARE

## 2024-10-03 DIAGNOSIS — Z12.5 ENCOUNTER FOR SCREENING FOR MALIGNANT NEOPLASM OF PROSTATE: ICD-10-CM

## 2024-10-03 DIAGNOSIS — E78.2 MIXED HYPERLIPIDEMIA: ICD-10-CM

## 2024-10-03 DIAGNOSIS — I10 ESSENTIAL HYPERTENSION: Primary | ICD-10-CM

## 2024-10-03 NOTE — TELEPHONE ENCOUNTER
Patient is scheduled to have labs drawn 24. Appointment note states labs are ordered. Will these lab orders be  by 2024?

## 2024-11-06 ENCOUNTER — TRANSCRIBE ORDERS (OUTPATIENT)
Dept: ADMINISTRATIVE | Facility: HOSPITAL | Age: 74
End: 2024-11-06
Payer: MEDICARE

## 2024-11-06 DIAGNOSIS — G61.0 GUILLAIN-BARRE SYNDROME: ICD-10-CM

## 2024-11-06 DIAGNOSIS — I10 ESSENTIAL HYPERTENSION: ICD-10-CM

## 2024-11-06 DIAGNOSIS — G61.81 CIDP (CHRONIC INFLAMMATORY DEMYELINATING POLYNEUROPATHY): Primary | ICD-10-CM

## 2024-11-06 RX ORDER — METOPROLOL SUCCINATE 100 MG/1
100 TABLET, EXTENDED RELEASE ORAL DAILY
Qty: 90 TABLET | Refills: 0 | Status: SHIPPED | OUTPATIENT
Start: 2024-11-06

## 2024-11-14 LAB
ALBUMIN SERPL-MCNC: 4.8 G/DL (ref 3.8–4.8)
ALP SERPL-CCNC: 71 IU/L (ref 44–121)
ALT SERPL-CCNC: 40 IU/L (ref 0–44)
AST SERPL-CCNC: 48 IU/L (ref 0–40)
BASOPHILS # BLD AUTO: 0.1 X10E3/UL (ref 0–0.2)
BASOPHILS NFR BLD AUTO: 1 %
BILIRUB SERPL-MCNC: 0.5 MG/DL (ref 0–1.2)
BUN SERPL-MCNC: 14 MG/DL (ref 8–27)
BUN/CREAT SERPL: 15 (ref 10–24)
CALCIUM SERPL-MCNC: 9.8 MG/DL (ref 8.6–10.2)
CHLORIDE SERPL-SCNC: 104 MMOL/L (ref 96–106)
CHOLEST SERPL-MCNC: 190 MG/DL (ref 100–199)
CHOLEST/HDLC SERPL: 4 RATIO (ref 0–5)
CO2 SERPL-SCNC: 26 MMOL/L (ref 20–29)
CREAT SERPL-MCNC: 0.96 MG/DL (ref 0.76–1.27)
EGFRCR SERPLBLD CKD-EPI 2021: 85 ML/MIN/1.73
EOSINOPHIL # BLD AUTO: 0.3 X10E3/UL (ref 0–0.4)
EOSINOPHIL NFR BLD AUTO: 5 %
ERYTHROCYTE [DISTWIDTH] IN BLOOD BY AUTOMATED COUNT: 12.6 % (ref 11.6–15.4)
GLOBULIN SER CALC-MCNC: 3.5 G/DL (ref 1.5–4.5)
GLUCOSE SERPL-MCNC: 96 MG/DL (ref 70–99)
HCT VFR BLD AUTO: 48.7 % (ref 37.5–51)
HDLC SERPL-MCNC: 48 MG/DL
HGB BLD-MCNC: 15.3 G/DL (ref 13–17.7)
IMM GRANULOCYTES # BLD AUTO: 0 X10E3/UL (ref 0–0.1)
IMM GRANULOCYTES NFR BLD AUTO: 0 %
LDLC SERPL CALC-MCNC: 128 MG/DL (ref 0–99)
LYMPHOCYTES # BLD AUTO: 2 X10E3/UL (ref 0.7–3.1)
LYMPHOCYTES NFR BLD AUTO: 29 %
MCH RBC QN AUTO: 30.2 PG (ref 26.6–33)
MCHC RBC AUTO-ENTMCNC: 31.4 G/DL (ref 31.5–35.7)
MCV RBC AUTO: 96 FL (ref 79–97)
MONOCYTES # BLD AUTO: 0.4 X10E3/UL (ref 0.1–0.9)
MONOCYTES NFR BLD AUTO: 7 %
NEUTROPHILS # BLD AUTO: 3.9 X10E3/UL (ref 1.4–7)
NEUTROPHILS NFR BLD AUTO: 58 %
PLATELET # BLD AUTO: 266 X10E3/UL (ref 150–450)
POTASSIUM SERPL-SCNC: 4.6 MMOL/L (ref 3.5–5.2)
PROT SERPL-MCNC: 8.3 G/DL (ref 6–8.5)
PSA SERPL-MCNC: 2.1 NG/ML (ref 0–4)
RBC # BLD AUTO: 5.06 X10E6/UL (ref 4.14–5.8)
SODIUM SERPL-SCNC: 142 MMOL/L (ref 134–144)
TRIGL SERPL-MCNC: 76 MG/DL (ref 0–149)
TSH SERPL DL<=0.005 MIU/L-ACNC: 5.48 UIU/ML (ref 0.45–4.5)
VLDLC SERPL CALC-MCNC: 14 MG/DL (ref 5–40)
WBC # BLD AUTO: 6.7 X10E3/UL (ref 3.4–10.8)

## 2024-11-14 RX ORDER — FAMOTIDINE 10 MG/ML
20 INJECTION, SOLUTION INTRAVENOUS AS NEEDED
OUTPATIENT
Start: 2024-11-18

## 2024-11-14 RX ORDER — SODIUM CHLORIDE 9 MG/ML
20 INJECTION, SOLUTION INTRAVENOUS ONCE
OUTPATIENT
Start: 2024-11-18

## 2024-11-14 RX ORDER — MEPERIDINE HYDROCHLORIDE 50 MG/ML
25 INJECTION INTRAMUSCULAR; INTRAVENOUS; SUBCUTANEOUS
OUTPATIENT
Start: 2024-11-18

## 2024-11-14 RX ORDER — DIPHENHYDRAMINE HYDROCHLORIDE 50 MG/ML
25 INJECTION INTRAMUSCULAR; INTRAVENOUS AS NEEDED
OUTPATIENT
Start: 2024-11-18

## 2024-11-19 NOTE — PROGRESS NOTES
Subjective   Emanuel Alcantara is a 73 y.o. male.     Chief Complaint   Patient presents with    Medicare Wellness-subsequent    Hypertension    Hyperlipidemia    Hypothyroidism       The patient is here: for coordination of medical care to discuss health maintenance and disease prevention to follow up on multiple medical problems.  Last comprehensive physical was on 9/29/2023.  Previous physical was performed by  Monty Henry MD  Overall has: moderate activity with work/home activities, exercises 2 - 3 times per week, good appetite, feels well with minor complaints, good energy level, is sleeping well, and returned to full activity. Nutrition: appropriate balanced diet and eating a variety of foods. Last tetanus shot was  [id number] .     Previous Exams:  PSA was 2.1 on 11/13/2024.   · Previous PSA was 1.8  on 9/23/2023.  US Carotid Dopplers was on 11/14/23 ordered by Dr Henry.    · Mild plaquing bilaterally. Degree of stenosis is estimated to be less than 50%.  Colonoscopy was on 3/17/2005 by Dr Mota.    · Diverticular disease in the sigmoid colon otherwise normal to the cecum  Stress Test was on 11/22/2023 ordered by Dr Henry.    ·  Normal stress echo with no significant echocardiographic evidence for myocardial ischemia.   ·  Left ventricular systolic function is normal. Left ventricular ejection fraction appears to be 61 - 65%.   · Left ventricular diastolic function is consistent with (grade I) impaired relaxation.   · Estimated right ventricular systolic pressure from tricuspid regurgitation is markedly elevated (>55 mmHg).  EKG was on 8/23/24 ordered by Dr Wall.    · Compared with previous ECG    · Similar to previous ECG   · Rhythm: sinus rhythm   · Clinical impression: normal ECG  US AAA was on 11/14/2023 ordered by Dr Henry.    · No evidence of abdominal aortic aneurysm    History of Present Illness     Recent Hospitalizations:  No hospitalization(s) within the last year..  [facility]    Patient Care  Team:  Monty Henry MD as PCP - General (Family Medicine)  Emanuel Wynn MD as Consulting Physician (Neurology)     I personally reviewed and updated the patient's allergies, medications, problem list, and past medical, surgical, social, and family history. I have reviewed and confirmed the accuracy of the HPI and ROS as documented by the MA/LPN/RN Shira Alas MA      Family History   Problem Relation Age of Onset    Hypertension Mother     Osteoporosis Mother     Glaucoma Mother     Macular degeneration Mother     Heart failure Father     Hypertension Father     Obesity Father     Hypertension Sister     Prostate cancer Brother     Stomach cancer Maternal Grandfather     Diabetes Paternal Grandmother     Pancreatic cancer Paternal Grandmother     Aneurysm Paternal Grandfather        Social History     Tobacco Use    Smoking status: Never     Passive exposure: Never    Smokeless tobacco: Never   Vaping Use    Vaping status: Never Used   Substance Use Topics    Alcohol use: Not Currently    Drug use: Never       History reviewed. No pertinent surgical history.    Patient Active Problem List   Diagnosis    Allergic rhinitis    Benign prostatic hyperplasia    Mixed hyperlipidemia    Essential hypertension    Osteoarthritis    Prostatitis, chronic    Encounter for screening for malignant neoplasm of prostate    Balanitis    Direct inguinal hernia    Medicare annual wellness visit, subsequent    Nevus    Dysplastic nevus    Bilateral leg weakness    Bilateral arm weakness    Paresthesia of foot, bilateral    Paresthesia of hand, bilateral    Ataxia    Multiple falls    COVID-19 virus infection    Weakness    Cytokine release syndrome, grade 1    CIDP (chronic inflammatory demyelinating polyneuropathy)    Screening for colon cancer    Guillain-Charleston syndrome         Current Outpatient Medications:     aspirin 81 MG EC tablet, Take 1 tablet by mouth 3 (Three) Times a Week. Every other day, Disp: , Rfl:      "losartan (Cozaar) 25 MG tablet, Take 1 tablet by mouth 2 (Two) Times a Day., Disp: 180 tablet, Rfl: 1    metoprolol succinate XL (TOPROL-XL) 100 MG 24 hr tablet, Take 1 tablet by mouth once daily, Disp: 90 tablet, Rfl: 0    rosuvastatin (CRESTOR) 5 MG tablet, Take 1 tablet by mouth Daily., Disp: 90 tablet, Rfl: 3    levothyroxine (SYNTHROID, LEVOTHROID) 25 MCG tablet, Take 1 tablet by mouth Every Morning. (Patient not taking: Reported on 11/20/2024), Disp: , Rfl:     No opioid medication identified on active medication list. I have reviewed chart for other potential  high risk medication/s and harmful drug interactions in the elderly.          Objective   /96 (BP Location: Right arm, Patient Position: Sitting, Cuff Size: Adult)   Pulse (!) 136   Temp 99.5 °F (37.5 °C) (Temporal)   Resp 18   Ht 182.9 cm (72.01\")   Wt 83.7 kg (184 lb 9.6 oz)   SpO2 98%   BMI 25.03 kg/m²   BP Readings from Last 3 Encounters:   11/20/24 160/96   09/12/24 164/91   09/11/24 156/86     Wt Readings from Last 3 Encounters:   11/20/24 83.7 kg (184 lb 9.6 oz)   09/12/24 83.7 kg (184 lb 8 oz)   09/11/24 84.4 kg (186 lb)       No results found.     Age-appropriate Screening Schedule:  Refer to the list below for future screening recommendations based on patient's age, sex and/or medical conditions. Orders for these [facility] tests are listed in the plan section. The patient has been provided with a written plan.    Health Maintenance   Topic Date Due    ZOSTER VACCINE (1 of 2) Never done    COVID-19 Vaccine (1 - 2024-25 season) Never done    COLORECTAL CANCER SCREENING  01/01/2025    INFLUENZA VACCINE  08/01/2025 (Originally 8/1/2024)    HEPATITIS C SCREENING  11/20/2025 (Originally 3/15/2020)    Pneumococcal Vaccine 65+ (2 of 2 - PCV) 11/20/2025 (Originally 12/10/2017)    BMI FOLLOWUP  06/21/2025    LIPID PANEL  11/13/2025    ANNUAL WELLNESS VISIT  11/20/2025    TDAP/TD VACCINES (3 - Td or Tdap) 10/27/2026       Fall Risk " Screen:    SIOMARA Fall Risk Assessment was completed, and patient is at MODERATE risk for falls. Assessment completed on:2024    Depression Screen:       2024    10:38 AM   PHQ-2/PHQ-9 Depression Screening   Little interest or pleasure in doing things Not at all   Feeling down, depressed, or hopeless Not at all   How difficult have these problems made it for you to do your work, take care of things at home, or get along with other people? Not difficult at all     I spent more than 16 minutes asking patient questions, counseling and documenting in the chart.    Health Habits and Functional and Cognitive Screenin/20/2024    10:00 AM   Functional & Cognitive Status   Do you have difficulty preparing food and eating? No   Do you have difficulty bathing yourself, getting dressed or grooming yourself? No   Do you have difficulty using the toilet? No   Do you have difficulty moving around from place to place? Yes   Do you have trouble with steps or getting out of a bed or a chair? Yes   Current Diet Well Balanced Diet   Dental Exam Up to date   Eye Exam Up to date   Exercise (times per week) 4 times per week   Current Exercises Include Walking;Light Weights   Do you need help using the phone?  No   Are you deaf or do you have serious difficulty hearing?  Yes   Do you need help to go to places out of walking distance? Yes   Do you need help shopping? No   Do you need help preparing meals?  No   Do you need help with housework?  No   Do you need help with laundry? No   Do you need help taking your medications? No   Do you need help managing money? No   Do you ever drive or ride in a car without wearing a seat belt? No   Have you felt unusual stress, anger or loneliness in the last month? No   Who do you live with? Spouse   If you need help, do you have trouble finding someone available to you? No   Have you been bothered in the last four weeks by sexual problems? No   Do you have difficulty  concentrating, remembering or making decisions? No       Does the patient have evidence of cognitive impairment? No    Advance Care Planning:  ACP discussion was held with the patient during this visit. Patient has an advance directive in EMR which is still valid.      A face-to-face visit was completed today with patient.  Counseling explanation, and discussion of advanced directives was performed.   The last advanced care visit was performed in 2023.  In a near life ending situation, from which he is not expected to recover functionally, and he is not able to speak for him, he does not want life sustaining measures. We discussed feeding tubes, ventilators and cardiac support as well as dialysis.    I spent more than 16 minutes discussing Advanced Care Planning information and documenting in the chart.    Identification of Risk Factors:  Risk factors include: Advance Directive Discussion  Cardiovascular risk  Colon Cancer Screening  Fall Risk  Immunizations Discussed/Encouraged (specific immunizations; Influenza, Prevnar 20 (Pneumococcal 20-valent conjugate), Shingrix, and COVID19 )  Prostate Cancer Screening   Dental Screening Recommended; [facility] DENTAL SCREENING RECOMMENDED:  Vision Screening Recommended; [facility] VISION SCREENING    Compared to one year ago, the patient feels his physical health is worse.  Compared to one year ago, the patient feels his mental health is the same.    Patient Self-Management and Personalized Health Advice  The patient has been provided with information about: diet, exercise, prevention of cardiac or vascular disease, and fall prevention and preventive services including:   Alcohol Misuse Screening and Counseling  (15 minutes counseling time, Code )  Bone Density Measurements  Colorectal Cancer Screening, Fecal Occult Blood Test  Depression Screening (15 minutes face to face, Code )  Diabetes Screening-Lab Order for either glucose quantitative blood (except reagent strip),  glucose;post glucose dose(includes glucose), or glucose tolerance test-3 specimens(includes glucose).      Assessment & Plan      Medications        Problem List         LOS      Diagnoses and all orders for this visit:    1. Medicare annual wellness visit, subsequent (Primary)    2. Essential hypertension    3. Mixed hyperlipidemia    4. Encounter for screening for malignant neoplasm of prostate    5. Screening for colon cancer        Medicare wellness.  Discussed health maintenance, routine immunizations, screening tests, lifestyle modification.

## 2024-11-19 NOTE — PROGRESS NOTES
Subjective   Emanuel Alcantara is a 73 y.o. male.     Chief Complaint   Patient presents with    Medicare Wellness-subsequent    Hypertension    Hyperlipidemia    Hypothyroidism       History of Present Illness  Influenza immunization was not given due to patient refusal.  Pneumococcal Vaccine was not given due to patient refusal.    Hypertension  This is a chronic problem. The current episode started more than 1 year ago. The problem is uncontrolled. Associated symptoms include anxiety. Pertinent negatives include no chest pain, headaches, malaise/fatigue, palpitations, shortness of breath or sweats. There are no associated agents to hypertension. Risk factors for coronary artery disease include dyslipidemia, male gender, family history and stress. Current antihypertension treatment includes beta blockers. The current treatment provides moderate improvement. There are no compliance problems.    Hyperlipidemia  This is a chronic problem. The current episode started more than 1 year ago. Recent lipid tests were reviewed and are high. There are no known factors aggravating his hyperlipidemia. Associated symptoms include leg pain. Pertinent negatives include no chest pain or shortness of breath. Current antihyperlipidemic treatment includes statins. There are no compliance problems.  Risk factors for coronary artery disease include male sex, hypertension and stress.            I personally reviewed and updated the patient's allergies, medications, problem list, and past medical, surgical, social, and family history. I have reviewed and confirmed the accuracy of the History of Present Illness and Review of Symptoms as documented by the MA/LPN/RN. Monty Henry MD    Family History   Problem Relation Age of Onset    Hypertension Mother     Osteoporosis Mother     Glaucoma Mother     Macular degeneration Mother     Heart failure Father     Hypertension Father     Obesity Father     Hypertension Sister     Prostate cancer  Brother     Stomach cancer Maternal Grandfather     Diabetes Paternal Grandmother     Pancreatic cancer Paternal Grandmother     Aneurysm Paternal Grandfather        Social History     Tobacco Use    Smoking status: Never     Passive exposure: Never    Smokeless tobacco: Never   Vaping Use    Vaping status: Never Used   Substance Use Topics    Alcohol use: Not Currently    Drug use: Never       History reviewed. No pertinent surgical history.    Patient Active Problem List   Diagnosis    Allergic rhinitis    Benign prostatic hyperplasia    Mixed hyperlipidemia    Essential hypertension    Osteoarthritis    Prostatitis, chronic    Encounter for screening for malignant neoplasm of prostate    Balanitis    Direct inguinal hernia    Medicare annual wellness visit, subsequent    Nevus    Dysplastic nevus    Bilateral leg weakness    Bilateral arm weakness    Paresthesia of foot, bilateral    Paresthesia of hand, bilateral    Ataxia    Multiple falls    COVID-19 virus infection    Weakness    Cytokine release syndrome, grade 1    CIDP (chronic inflammatory demyelinating polyneuropathy)    Screening for colon cancer    Guillain-Bayside syndrome    Irritable bowel syndrome with diarrhea         Current Outpatient Medications:     aspirin 81 MG EC tablet, Take 1 tablet by mouth 3 (Three) Times a Week. Every other day, Disp: , Rfl:     losartan (Cozaar) 25 MG tablet, Take 1 tablet by mouth 2 (Two) Times a Day., Disp: 180 tablet, Rfl: 1    metoprolol succinate XL (TOPROL-XL) 100 MG 24 hr tablet, Take 1 tablet by mouth once daily, Disp: 90 tablet, Rfl: 0    rosuvastatin (CRESTOR) 5 MG tablet, Take 1 tablet by mouth Daily., Disp: 90 tablet, Rfl: 3    dicyclomine (BENTYL) 20 MG tablet, Take 1 tablet by mouth 3 (Three) Times a Day As Needed for Abdominal Cramping., Disp: 90 tablet, Rfl: 5         Review of Systems   Constitutional:  Negative for chills, diaphoresis and malaise/fatigue.   HENT:  Negative for trouble swallowing and  "voice change.    Eyes:  Negative for visual disturbance.   Respiratory:  Negative for shortness of breath.    Cardiovascular:  Negative for chest pain and palpitations.   Gastrointestinal:  Negative for abdominal pain and nausea.   Endocrine: Negative for polydipsia and polyphagia.   Genitourinary:  Negative for hematuria.   Musculoskeletal:  Negative for neck stiffness.   Skin:  Negative for color change and pallor.   Allergic/Immunologic: Negative for immunocompromised state.   Neurological:  Negative for dizziness, seizures, syncope, light-headedness and headache.   Hematological:  Negative for adenopathy.   Psychiatric/Behavioral:  Negative for hallucinations, sleep disturbance and suicidal ideas.        I have reviewed and confirmed the accuracy of the ROS as documented by the MA/LPN/RN Monty Henry MD      Objective   /96 (BP Location: Right arm, Patient Position: Sitting, Cuff Size: Adult)   Pulse (!) 49   Temp 99.5 °F (37.5 °C) (Temporal)   Resp 18   Ht 182.9 cm (72.01\")   Wt 83.7 kg (184 lb 9.6 oz)   SpO2 98%   BMI 25.03 kg/m²   BP Readings from Last 3 Encounters:   11/20/24 158/96   09/12/24 164/91   09/11/24 156/86     Wt Readings from Last 3 Encounters:   11/20/24 83.7 kg (184 lb 9.6 oz)   09/12/24 83.7 kg (184 lb 8 oz)   09/11/24 84.4 kg (186 lb)     Physical Exam  Constitutional:       Appearance: Normal appearance. He is well-developed. He is not diaphoretic.   HENT:      Head: Normocephalic and atraumatic.      Right Ear: Hearing, tympanic membrane, ear canal and external ear normal.      Left Ear: Hearing, tympanic membrane, ear canal and external ear normal.      Nose: Nose normal. No mucosal edema or congestion.      Right Sinus: No maxillary sinus tenderness or frontal sinus tenderness.      Left Sinus: No maxillary sinus tenderness or frontal sinus tenderness.      Mouth/Throat:      Mouth: Mucous membranes are moist. No oral lesions.      Pharynx: Uvula midline. No oropharyngeal " "exudate or posterior oropharyngeal erythema.      Tonsils: No tonsillar exudate.   Eyes:      General: Lids are normal.      Extraocular Movements: Extraocular movements intact.      Conjunctiva/sclera: Conjunctivae normal.      Pupils: Pupils are equal, round, and reactive to light.   Neck:      Thyroid: No thyromegaly.      Vascular: No carotid bruit or JVD.      Trachea: No tracheal deviation.   Cardiovascular:      Rate and Rhythm: Normal rate and regular rhythm.      Heart sounds: Normal heart sounds. No murmur heard.     No friction rub. No gallop.   Pulmonary:      Effort: Pulmonary effort is normal.      Breath sounds: Normal breath sounds. No stridor. No decreased breath sounds, wheezing or rales.   Abdominal:      General: Bowel sounds are normal. There is no distension.      Palpations: Abdomen is soft. There is no mass.      Tenderness: There is no abdominal tenderness. There is no guarding or rebound.      Hernia: No hernia is present.   Lymphadenopathy:      Head:      Right side of head: No submental, submandibular, tonsillar, preauricular, posterior auricular or occipital adenopathy.      Left side of head: No submental, submandibular, tonsillar, preauricular, posterior auricular or occipital adenopathy.      Cervical: No cervical adenopathy.   Skin:     General: Skin is warm and dry.      Coloration: Skin is not pale.   Neurological:      Mental Status: He is alert and oriented to person, place, and time.      Cranial Nerves: No cranial nerve deficit.      Sensory: No sensory deficit.      Coordination: Coordination normal.      Gait: Gait normal.      Deep Tendon Reflexes: Reflexes are normal and symmetric.         Data / Lab Results:    No results found for: \"HGBA1C\"  Lab Results   Component Value Date    Glucose 96 11/13/2024    Glucose 99 12/07/2021    Glucose, UA Negative 11/20/2024    Glucose, UA Negative 12/07/2021     Lab Results   Component Value Date     (H) 11/13/2024     " "(H) 09/23/2023     (H) 09/14/2022     Lab Results   Component Value Date    CHOL 232 (H) 03/08/2019    CHOL 227 (H) 11/26/2018    CHOL 214 (H) 06/17/2016     Lab Results   Component Value Date    TRIG 76 11/13/2024    TRIG 84 09/23/2023    TRIG 99 09/14/2022     Lab Results   Component Value Date    HDL 48 11/13/2024    HDL 47 09/23/2023    HDL 47 09/14/2022     Lab Results   Component Value Date    PSA 2.1 11/13/2024    PSA 1.8 09/23/2023    PSA 1.3 09/14/2022     Lab Results   Component Value Date    WBC 6.7 11/13/2024    HGB 15.3 11/13/2024    HCT 48.7 11/13/2024    MCV 96 11/13/2024     11/13/2024     Lab Results   Component Value Date    TSH 5.480 (H) 11/13/2024      Lab Results   Component Value Date    GLUCOSE 96 11/13/2024    BUN 14 11/13/2024    CREATININE 0.96 11/13/2024    EGFRIFNONA 104 12/07/2021    EGFRIFAFRI 95 09/13/2021    BCR 15 11/13/2024    K 4.6 11/13/2024    CO2 26 11/13/2024    CALCIUM 9.8 11/13/2024    PROTENTOTREF 8.3 11/13/2024    ALBUMIN 4.8 11/13/2024    LABIL2 1.7 09/23/2023    AST 48 (H) 11/13/2024    ALT 40 11/13/2024     Lab Results   Component Value Date    SEDRATE 21 (H) 12/06/2021      Lab Results   Component Value Date    CRP 0.47 12/06/2021      No results found for: \"IRON\", \"TIBC\", \"FERRITIN\"   Lab Results   Component Value Date    HFHRTSJJ80 1,324 (H) 12/06/2021          Assessment & Plan      Medications        Problem List         LOS       Medicare wellness.  Doing well.  Neurology recommendations holding All vaccinations.  Tolerating coated baby aspirin every other day.  Discussed health maintenance, screening test, lifestyle modification.  Hypertension.  Improved today on metoprolol  mg daily, losartan 25 mg twice daily, home blood pressure results reviewed, home blood pressure monitor results reviewed, tolerating Toprol-XL, Continu monitor blood pressure,  Remote history of benign cardiac stress testing,  repeat stress echo with hypertensive response " to exercise, other wise benign 2023, AAA ultrasound benign 2023.  blood pressure better controlled..  Carotid stenosis.  Mild, bilateral carotid Dopplers 2023.  Continue risk factor reduction.  Recheck 3 to 4 years.  Palpitations.  History of, much improved on atenolol, changed to Toprol..  Limit caffeine.  Hyperlipidemia.  Stabke today, LDL to 129, tolerating Crestor at 5 days/week, dors not tolerate further increase.  Discussed diet and exercise lifestyle to modification.  Discussed diet, exercise, lifestyle modification.   Direct inguinal hernia.  Small/nontender, asymptomatic currently.  Continue to monitor clinically.  Consider surgery referral if worsening symptoms.  Asymmetric abdominal fat.  He is worse today, LDL back to 132, concerned about a prominence of his abdominal wall which is a symmetric.  Benign exam today, asymptomatic.  Reassurance given.  Has had CT abdomen will get records.  Follow-up recheck.  Consider repeat imaging, surgery referral if worsening symptoms.  Colon screening.  Has had colonoscopy 2015, repeat was recommended in 10 years, will get the records.  Followed by [facility].  Screening for prostate cancer.  PSA benign.  Dysplastic nevus.  With severe atypia.  Right forearm, healing well status post resection, sutures removed today.  Margins clear but close, recommend further resection, dermatology referral scheduled.  Positive multiple nevi on exam, benign appearance today.  Sun protection discussed.  Follow-up for yearly skin exams.-Positive family history melanoma, father.  COVID-19 viral respiratory infection.  Improved/resolved currently, has completed quarantine.  Has not been vaccinated.  Proximal muscle weakness.  Worse, flare currently, has good response to infusions, maintenance of Rx plans.  Persistent symptoms today, has neurology follow-up upcomi, had been making steady improvement in past, most symptoms but about the same for the last 3 months.  Status post benign MRI  brain/C-spine/neurology eval inpatient.  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  Clinically improved today but having some persistent extremity numbness/weakness..  Rehabilitation exercises discussed.  Followed by neurology Dr. Wynn at , has had repeat work-up with brain imaging, LP this summer, neurology recommends that he does not take COVID-19 vaccine.  Followed by neurology Dr. Ross DDx includes Guillain-Barré versus COPD.  Consider steroid Rx, PT if persistent symptoms  Family history of prostate cancer.  His brother, recently diagnosed.  PSA benign, continue to monitor.  BPH.  Overall worse, tolerating saw palmetto, consider add Flomax.  Limit caffeine.  Diarrhea/cramping.  Likely flare IBS, restart Bentyl.  Check blood work/stool studies.  Recommend repeat colonoscopy gastroenterology referral scheduled.    Previous Exams:  PSA was 2.1 on 11/13/2024.   · Previous PSA was 1.8  on 9/23/2023.  US Carotid Dopplers was on 11/14/23 ordered by Dr Henry.    · Mild plaquing bilaterally. Degree of stenosis is estimated to be less than 50%.  Colonoscopy was on 3/17/2005 by Dr Mota.    · Diverticular disease in the sigmoid colon otherwise normal to the cecum  Stress Test was on 11/22/2023 ordered by Dr Henry.    ·  Normal stress echo with no significant echocardiographic evidence for myocardial ischemia.   ·  Left ventricular systolic function is normal. Left ventricular ejection fraction appears to be 61 - 65%.   · Left ventricular diastolic function is consistent with (grade I) impaired relaxation.   · Estimated right ventricular systolic pressure from tricuspid regurgitation is markedly elevated (>55 mmHg).  EKG was on 8/23/24 ordered by Dr Wall.    · Compared with previous ECG    · Similar to previous ECG   · Rhythm: sinus rhythm   · Clinical impression: normal ECG  US AAA was on 11/14/2023 ordered by Dr Henry.    · No evidence of abdominal aortic aneurysm    Diagnoses and all orders for  this visit:    1. Medicare annual wellness visit, subsequent (Primary)  -     POCT urinalysis dipstick, manual    2. Essential hypertension    3. Mixed hyperlipidemia    4. Encounter for screening for malignant neoplasm of prostate    5. Screening for colon cancer    6. Thyroid disorder  -     T4, Free    7. Abdominal pain, unspecified abdominal location  -     T4, Free  -     C-reactive Protein  -     Lipase  -     Fecal Leukocytes - Stool, Per Rectum  -     Gastrointestinal Panel, PCR - Stool, Per Rectum  -     Ova & Parasite Examination - Stool, Per Rectum  -     Clostridioides difficile Toxin, PCR - , Per Rectum  -     dicyclomine (BENTYL) 20 MG tablet; Take 1 tablet by mouth 3 (Three) Times a Day As Needed for Abdominal Cramping.  Dispense: 90 tablet; Refill: 5    8. Diarrhea, unspecified type  -     Fecal Leukocytes - Stool, Per Rectum  -     Gastrointestinal Panel, PCR - Stool, Per Rectum  -     Ova & Parasite Examination - Stool, Per Rectum  -     Clostridioides difficile Toxin, PCR - , Per Rectum  -     dicyclomine (BENTYL) 20 MG tablet; Take 1 tablet by mouth 3 (Three) Times a Day As Needed for Abdominal Cramping.  Dispense: 90 tablet; Refill: 5    9. Weakness    10. CIDP (chronic inflammatory demyelinating polyneuropathy)    11. Irritable bowel syndrome with diarrhea              Expected course, medications, and adverse effects discussed.  Call or return if worsening or persistent symptoms.  I wore protective equipment throughout this patient encounter including a mask, gloves, and eye protection.  Hand hygiene was performed before donning protective equipment and after removal when leaving the room. The complete contents of the Assessment and Plan and Data/Lab Results as documented above have been reviewed and addressed by myself with the patient today as part of an ongoing evaluation / treatment plan.  If some of the documentation has been copied from a previous note and is unchanged it indicates that  this problem / plan has been assessed today but is stable from a previous visit and no changes have been recommended.

## 2024-11-20 ENCOUNTER — TELEPHONE (OUTPATIENT)
Dept: FAMILY MEDICINE CLINIC | Facility: CLINIC | Age: 74
End: 2024-11-20

## 2024-11-20 ENCOUNTER — OFFICE VISIT (OUTPATIENT)
Dept: FAMILY MEDICINE CLINIC | Facility: CLINIC | Age: 74
End: 2024-11-20
Payer: MEDICARE

## 2024-11-20 VITALS
OXYGEN SATURATION: 98 % | HEIGHT: 72 IN | HEART RATE: 49 BPM | DIASTOLIC BLOOD PRESSURE: 96 MMHG | SYSTOLIC BLOOD PRESSURE: 158 MMHG | BODY MASS INDEX: 25 KG/M2 | WEIGHT: 184.6 LBS | RESPIRATION RATE: 18 BRPM | TEMPERATURE: 99.5 F

## 2024-11-20 DIAGNOSIS — Z12.5 ENCOUNTER FOR SCREENING FOR MALIGNANT NEOPLASM OF PROSTATE: ICD-10-CM

## 2024-11-20 DIAGNOSIS — E07.9 THYROID DISORDER: ICD-10-CM

## 2024-11-20 DIAGNOSIS — Z12.11 SCREENING FOR COLON CANCER: ICD-10-CM

## 2024-11-20 DIAGNOSIS — Z00.00 MEDICARE ANNUAL WELLNESS VISIT, SUBSEQUENT: Primary | ICD-10-CM

## 2024-11-20 DIAGNOSIS — R53.1 WEAKNESS: ICD-10-CM

## 2024-11-20 DIAGNOSIS — G61.81 CIDP (CHRONIC INFLAMMATORY DEMYELINATING POLYNEUROPATHY): ICD-10-CM

## 2024-11-20 DIAGNOSIS — K58.0 IRRITABLE BOWEL SYNDROME WITH DIARRHEA: ICD-10-CM

## 2024-11-20 DIAGNOSIS — I10 ESSENTIAL HYPERTENSION: ICD-10-CM

## 2024-11-20 DIAGNOSIS — R19.7 DIARRHEA, UNSPECIFIED TYPE: ICD-10-CM

## 2024-11-20 DIAGNOSIS — E78.2 MIXED HYPERLIPIDEMIA: ICD-10-CM

## 2024-11-20 DIAGNOSIS — R10.9 ABDOMINAL PAIN, UNSPECIFIED ABDOMINAL LOCATION: ICD-10-CM

## 2024-11-20 LAB
BILIRUB BLD-MCNC: NEGATIVE MG/DL
CLARITY, POC: CLEAR
COLOR UR: YELLOW
GLUCOSE UR STRIP-MCNC: NEGATIVE MG/DL
KETONES UR QL: NEGATIVE
LEUKOCYTE EST, POC: NEGATIVE
NITRITE UR-MCNC: NEGATIVE MG/ML
PH UR: 6 [PH] (ref 5–8)
PROT UR STRIP-MCNC: NEGATIVE MG/DL
RBC # UR STRIP: NEGATIVE /UL
SP GR UR: 1.01 (ref 1–1.03)
UROBILINOGEN UR QL: NORMAL

## 2024-11-20 RX ORDER — DICYCLOMINE HCL 20 MG
20 TABLET ORAL 3 TIMES DAILY PRN
Qty: 90 TABLET | Refills: 5 | Status: SHIPPED | OUTPATIENT
Start: 2024-11-20

## 2024-11-21 PROBLEM — K58.0 IRRITABLE BOWEL SYNDROME WITH DIARRHEA: Status: ACTIVE | Noted: 2024-11-21

## 2024-11-21 LAB
CRP SERPL-MCNC: 1 MG/L (ref 0–10)
LIPASE SERPL-CCNC: 22 U/L (ref 13–78)
T4 FREE SERPL-MCNC: 1.27 NG/DL (ref 0.82–1.77)

## 2024-11-26 LAB — C DIFF TOX GENS STL QL NAA+PROBE: NEGATIVE

## 2024-11-27 LAB
ADV 40+41 DNA STL QL NAA+NON-PROBE: NOT DETECTED
ASTRO TYP 1-8 RNA STL QL NAA+NON-PROBE: NOT DETECTED
C CAYETANENSIS DNA STL QL NAA+NON-PROBE: NOT DETECTED
C COLI+JEJ+UPSA DNA STL QL NAA+NON-PROBE: NOT DETECTED
C DIF TOX TCDA+TCDB STL QL NAA+NON-PROBE: NOT DETECTED
CRYPTOSP DNA STL QL NAA+NON-PROBE: NOT DETECTED
E COLI O157 DNA STL QL NAA+NON-PROBE: NORMAL
E HISTOLYT DNA STL QL NAA+NON-PROBE: NOT DETECTED
EAEC PAA PLAS AGGR+AATA ST NAA+NON-PRB: NOT DETECTED
EC STX1+STX2 GENES STL QL NAA+NON-PROBE: NOT DETECTED
EPEC EAE GENE STL QL NAA+NON-PROBE: NOT DETECTED
ETEC LTA+ST1A+ST1B TOX ST NAA+NON-PROBE: NOT DETECTED
G LAMBLIA DNA STL QL NAA+NON-PROBE: NOT DETECTED
NOROVIRUS GI+II RNA STL QL NAA+NON-PROBE: NOT DETECTED
O+P SPEC MICRO: NORMAL
O+P STL CONC: NORMAL
P SHIGELLOIDES DNA STL QL NAA+NON-PROBE: NOT DETECTED
RVA RNA STL QL NAA+NON-PROBE: NOT DETECTED
S ENT+BONG DNA STL QL NAA+NON-PROBE: NOT DETECTED
SAPO I+II+IV+V RNA STL QL NAA+NON-PROBE: NOT DETECTED
SHIGELLA SP+EIEC IPAH ST NAA+NON-PROBE: NOT DETECTED
V CHOL+PARA+VUL DNA STL QL NAA+NON-PROBE: NOT DETECTED
V CHOLERAE DNA STL QL NAA+NON-PROBE: NOT DETECTED
WBC STL QL MICRO: NORMAL
WBC STL QL MICRO: NORMAL
Y ENTEROCOL DNA STL QL NAA+NON-PROBE: NOT DETECTED

## 2024-12-03 ENCOUNTER — TELEPHONE (OUTPATIENT)
Dept: FAMILY MEDICINE CLINIC | Facility: CLINIC | Age: 74
End: 2024-12-03
Payer: MEDICARE

## 2024-12-03 NOTE — TELEPHONE ENCOUNTER
Caller: Emanuel Alcantara    Relationship: Self    Best call back number: [phone]     Caller requesting test results: EMANUEL    What test was performed: LABS    When was the test performed: 11/24/24    Where was the test performed: IN OFFICE    Additional notes:

## 2024-12-03 NOTE — TELEPHONE ENCOUNTER
"I called and spoke with patient. He is waiting for Gastro to call back to schedule an appointment.    \"Let him know stool studies look good, no sign of cdif or parasites or infection, want him to try the bentyl and keep follow up with gastroenterology as planned\"  "

## 2024-12-09 ENCOUNTER — HOSPITAL ENCOUNTER (OUTPATIENT)
Dept: ONCOLOGY | Facility: HOSPITAL | Age: 74
Discharge: HOME | End: 2024-12-09
Admitting: PSYCHIATRY & NEUROLOGY
Payer: MEDICARE

## 2024-12-09 VITALS
RESPIRATION RATE: 18 BRPM | OXYGEN SATURATION: 98 % | HEIGHT: 72 IN | SYSTOLIC BLOOD PRESSURE: 145 MMHG | TEMPERATURE: 97.4 F | BODY MASS INDEX: 23.95 KG/M2 | DIASTOLIC BLOOD PRESSURE: 93 MMHG | HEART RATE: 93 BPM | WEIGHT: 176.8 LBS

## 2024-12-09 DIAGNOSIS — G61.0 GUILLAIN-BARRE SYNDROME: Primary | ICD-10-CM

## 2024-12-09 PROCEDURE — 25010000002 IMMUNE GLOBULIN (HUMAN) 40 GM/400ML SOLUTION: Performed by: PSYCHIATRY & NEUROLOGY

## 2024-12-09 PROCEDURE — 96366 THER/PROPH/DIAG IV INF ADDON: CPT

## 2024-12-09 PROCEDURE — 96365 THER/PROPH/DIAG IV INF INIT: CPT

## 2024-12-09 RX ORDER — FAMOTIDINE 10 MG/ML
20 INJECTION, SOLUTION INTRAVENOUS AS NEEDED
Status: CANCELLED | OUTPATIENT
Start: 2024-12-25

## 2024-12-09 RX ORDER — SODIUM CHLORIDE 9 MG/ML
20 INJECTION, SOLUTION INTRAVENOUS ONCE
Status: DISCONTINUED | OUTPATIENT
Start: 2024-12-09 | End: 2024-12-10 | Stop reason: HOSPADM

## 2024-12-09 RX ORDER — HYDROCORTISONE SODIUM SUCCINATE 100 MG/2ML
100 INJECTION INTRAMUSCULAR; INTRAVENOUS AS NEEDED
Status: CANCELLED | OUTPATIENT
Start: 2024-12-25

## 2024-12-09 RX ORDER — DIPHENHYDRAMINE HYDROCHLORIDE 50 MG/ML
25 INJECTION INTRAMUSCULAR; INTRAVENOUS AS NEEDED
Status: CANCELLED | OUTPATIENT
Start: 2024-12-25

## 2024-12-09 RX ORDER — SODIUM CHLORIDE 9 MG/ML
20 INJECTION, SOLUTION INTRAVENOUS ONCE
Status: CANCELLED | OUTPATIENT
Start: 2024-12-25

## 2024-12-09 RX ADMIN — IMMUNE GLOBULIN (HUMAN) 40 G: 10 INJECTION INTRAVENOUS; SUBCUTANEOUS at 08:52

## 2024-12-10 ENCOUNTER — HOSPITAL ENCOUNTER (OUTPATIENT)
Dept: ONCOLOGY | Facility: HOSPITAL | Age: 74
Discharge: HOME | End: 2024-12-10
Admitting: PSYCHIATRY & NEUROLOGY
Payer: MEDICARE

## 2024-12-10 VITALS
SYSTOLIC BLOOD PRESSURE: 133 MMHG | BODY MASS INDEX: 24.11 KG/M2 | RESPIRATION RATE: 16 BRPM | HEIGHT: 72 IN | DIASTOLIC BLOOD PRESSURE: 82 MMHG | TEMPERATURE: 97.7 F | HEART RATE: 90 BPM | OXYGEN SATURATION: 98 % | WEIGHT: 178 LBS

## 2024-12-10 DIAGNOSIS — G61.0 GUILLAIN-BARRE SYNDROME: Primary | ICD-10-CM

## 2024-12-10 PROCEDURE — 96365 THER/PROPH/DIAG IV INF INIT: CPT

## 2024-12-10 PROCEDURE — 25010000002 IMMUNE GLOBULIN (HUMAN) 40 GM/400ML SOLUTION: Performed by: PSYCHIATRY & NEUROLOGY

## 2024-12-10 PROCEDURE — 96366 THER/PROPH/DIAG IV INF ADDON: CPT

## 2024-12-10 RX ORDER — FAMOTIDINE 10 MG/ML
20 INJECTION, SOLUTION INTRAVENOUS AS NEEDED
Status: CANCELLED | OUTPATIENT
Start: 2024-12-26

## 2024-12-10 RX ORDER — SODIUM CHLORIDE 9 MG/ML
20 INJECTION, SOLUTION INTRAVENOUS ONCE
Status: DISCONTINUED | OUTPATIENT
Start: 2024-12-10 | End: 2024-12-11 | Stop reason: HOSPADM

## 2024-12-10 RX ORDER — HYDROCORTISONE SODIUM SUCCINATE 100 MG/2ML
100 INJECTION INTRAMUSCULAR; INTRAVENOUS AS NEEDED
Status: CANCELLED | OUTPATIENT
Start: 2024-12-26

## 2024-12-10 RX ORDER — SODIUM CHLORIDE 9 MG/ML
20 INJECTION, SOLUTION INTRAVENOUS ONCE
Status: CANCELLED | OUTPATIENT
Start: 2024-12-26

## 2024-12-10 RX ORDER — DIPHENHYDRAMINE HYDROCHLORIDE 50 MG/ML
25 INJECTION INTRAMUSCULAR; INTRAVENOUS AS NEEDED
Status: CANCELLED | OUTPATIENT
Start: 2024-12-26

## 2024-12-10 RX ADMIN — IMMUNE GLOBULIN (HUMAN) 40 G: 10 INJECTION INTRAVENOUS; SUBCUTANEOUS at 08:16

## 2024-12-10 NOTE — PROGRESS NOTES
Pt to the clinic for IVIG as ordered by Dr. Henry.  PIV obtained and treatment given without complaints.  AVS given prior to discharge.

## 2024-12-11 ENCOUNTER — HOSPITAL ENCOUNTER (OUTPATIENT)
Dept: ONCOLOGY | Facility: HOSPITAL | Age: 74
Discharge: HOME | End: 2024-12-11
Admitting: PSYCHIATRY & NEUROLOGY
Payer: MEDICARE

## 2024-12-11 VITALS
RESPIRATION RATE: 18 BRPM | BODY MASS INDEX: 24.06 KG/M2 | WEIGHT: 177.6 LBS | TEMPERATURE: 98.4 F | HEART RATE: 90 BPM | OXYGEN SATURATION: 97 % | HEIGHT: 72 IN | DIASTOLIC BLOOD PRESSURE: 78 MMHG | SYSTOLIC BLOOD PRESSURE: 125 MMHG

## 2024-12-11 DIAGNOSIS — G61.0 GUILLAIN-BARRE SYNDROME: Primary | ICD-10-CM

## 2024-12-11 PROCEDURE — 96365 THER/PROPH/DIAG IV INF INIT: CPT

## 2024-12-11 PROCEDURE — 25010000002 IMMUNE GLOBULIN (HUMAN) 40 GM/400ML SOLUTION: Performed by: PSYCHIATRY & NEUROLOGY

## 2024-12-11 PROCEDURE — 96366 THER/PROPH/DIAG IV INF ADDON: CPT

## 2024-12-11 RX ORDER — DIPHENHYDRAMINE HYDROCHLORIDE 50 MG/ML
25 INJECTION INTRAMUSCULAR; INTRAVENOUS AS NEEDED
Status: CANCELLED | OUTPATIENT
Start: 2024-12-27

## 2024-12-11 RX ORDER — HYDROCORTISONE SODIUM SUCCINATE 100 MG/2ML
100 INJECTION INTRAMUSCULAR; INTRAVENOUS AS NEEDED
Status: CANCELLED | OUTPATIENT
Start: 2024-12-27

## 2024-12-11 RX ORDER — SODIUM CHLORIDE 9 MG/ML
20 INJECTION, SOLUTION INTRAVENOUS ONCE
Status: DISCONTINUED | OUTPATIENT
Start: 2024-12-11 | End: 2024-12-12 | Stop reason: HOSPADM

## 2024-12-11 RX ORDER — FAMOTIDINE 10 MG/ML
20 INJECTION, SOLUTION INTRAVENOUS AS NEEDED
Status: CANCELLED | OUTPATIENT
Start: 2024-12-27

## 2024-12-11 RX ORDER — SODIUM CHLORIDE 9 MG/ML
20 INJECTION, SOLUTION INTRAVENOUS ONCE
Status: CANCELLED | OUTPATIENT
Start: 2024-12-27

## 2024-12-11 RX ADMIN — IMMUNE GLOBULIN (HUMAN) 40 G: 10 INJECTION INTRAVENOUS; SUBCUTANEOUS at 08:56

## 2024-12-12 ENCOUNTER — HOSPITAL ENCOUNTER (OUTPATIENT)
Dept: ONCOLOGY | Facility: HOSPITAL | Age: 74
Discharge: HOME | End: 2024-12-12
Payer: MEDICARE

## 2024-12-12 VITALS
WEIGHT: 177.2 LBS | BODY MASS INDEX: 24 KG/M2 | DIASTOLIC BLOOD PRESSURE: 81 MMHG | HEART RATE: 72 BPM | SYSTOLIC BLOOD PRESSURE: 126 MMHG | TEMPERATURE: 97.5 F | OXYGEN SATURATION: 97 % | HEIGHT: 72 IN | RESPIRATION RATE: 16 BRPM

## 2024-12-12 DIAGNOSIS — G61.0 GUILLAIN-BARRE SYNDROME: Primary | ICD-10-CM

## 2024-12-12 PROCEDURE — 96365 THER/PROPH/DIAG IV INF INIT: CPT

## 2024-12-12 PROCEDURE — 25010000002 IMMUNE GLOBULIN (HUMAN) 40 GM/400ML SOLUTION: Performed by: PSYCHIATRY & NEUROLOGY

## 2024-12-12 PROCEDURE — 96366 THER/PROPH/DIAG IV INF ADDON: CPT

## 2024-12-12 RX ORDER — SODIUM CHLORIDE 9 MG/ML
20 INJECTION, SOLUTION INTRAVENOUS ONCE
Status: DISCONTINUED | OUTPATIENT
Start: 2024-12-12 | End: 2024-12-13 | Stop reason: HOSPADM

## 2024-12-12 RX ORDER — HYDROCORTISONE SODIUM SUCCINATE 100 MG/2ML
100 INJECTION INTRAMUSCULAR; INTRAVENOUS AS NEEDED
OUTPATIENT
Start: 2024-12-28

## 2024-12-12 RX ORDER — DIPHENHYDRAMINE HYDROCHLORIDE 50 MG/ML
25 INJECTION INTRAMUSCULAR; INTRAVENOUS AS NEEDED
OUTPATIENT
Start: 2024-12-28

## 2024-12-12 RX ORDER — FAMOTIDINE 10 MG/ML
20 INJECTION, SOLUTION INTRAVENOUS AS NEEDED
OUTPATIENT
Start: 2024-12-28

## 2024-12-12 RX ORDER — SODIUM CHLORIDE 9 MG/ML
20 INJECTION, SOLUTION INTRAVENOUS ONCE
Status: CANCELLED | OUTPATIENT
Start: 2024-12-28

## 2024-12-12 RX ADMIN — IMMUNE GLOBULIN (HUMAN) 40 G: 10 INJECTION INTRAVENOUS; SUBCUTANEOUS at 08:49

## 2024-12-12 NOTE — PROGRESS NOTES
Pt here for IVIG as per Dr Henry, pt denies new problems/issues and reports tolerating IVIG well. Treatment administered per MAR and pt tolerated well and discharged with spouse and AVS provided

## 2025-01-03 RX ORDER — SODIUM CHLORIDE 9 MG/ML
20 INJECTION, SOLUTION INTRAVENOUS ONCE
OUTPATIENT
Start: 2025-01-06

## 2025-01-13 ENCOUNTER — HOSPITAL ENCOUNTER (OUTPATIENT)
Dept: ONCOLOGY | Facility: HOSPITAL | Age: 75
Discharge: HOME | End: 2025-01-13
Admitting: PSYCHIATRY & NEUROLOGY
Payer: MEDICARE

## 2025-01-13 VITALS
TEMPERATURE: 97.7 F | RESPIRATION RATE: 18 BRPM | BODY MASS INDEX: 24.68 KG/M2 | SYSTOLIC BLOOD PRESSURE: 154 MMHG | OXYGEN SATURATION: 100 % | WEIGHT: 182 LBS | HEART RATE: 83 BPM | DIASTOLIC BLOOD PRESSURE: 84 MMHG

## 2025-01-13 DIAGNOSIS — G61.0 GUILLAIN-BARRE SYNDROME: Primary | ICD-10-CM

## 2025-01-13 PROCEDURE — 96365 THER/PROPH/DIAG IV INF INIT: CPT

## 2025-01-13 PROCEDURE — 96366 THER/PROPH/DIAG IV INF ADDON: CPT

## 2025-01-13 PROCEDURE — 25010000002 IMMUNE GLOBULIN (HUMAN) 40 GM/400ML SOLUTION: Performed by: PSYCHIATRY & NEUROLOGY

## 2025-01-13 RX ORDER — SODIUM CHLORIDE 9 MG/ML
20 INJECTION, SOLUTION INTRAVENOUS ONCE
Status: CANCELLED | OUTPATIENT
Start: 2025-01-14

## 2025-01-13 RX ORDER — SODIUM CHLORIDE 9 MG/ML
20 INJECTION, SOLUTION INTRAVENOUS ONCE
Status: DISCONTINUED | OUTPATIENT
Start: 2025-01-13 | End: 2025-01-14 | Stop reason: HOSPADM

## 2025-01-13 RX ADMIN — IMMUNE GLOBULIN (HUMAN) 40 G: 10 INJECTION INTRAVENOUS; SUBCUTANEOUS at 08:08

## 2025-01-14 ENCOUNTER — HOSPITAL ENCOUNTER (OUTPATIENT)
Dept: ONCOLOGY | Facility: HOSPITAL | Age: 75
Discharge: HOME | End: 2025-01-14
Admitting: PSYCHIATRY & NEUROLOGY
Payer: MEDICARE

## 2025-01-14 VITALS
OXYGEN SATURATION: 99 % | TEMPERATURE: 97.6 F | HEART RATE: 75 BPM | WEIGHT: 182.1 LBS | HEIGHT: 72 IN | SYSTOLIC BLOOD PRESSURE: 163 MMHG | BODY MASS INDEX: 24.66 KG/M2 | RESPIRATION RATE: 14 BRPM | DIASTOLIC BLOOD PRESSURE: 91 MMHG

## 2025-01-14 DIAGNOSIS — G61.0 GUILLAIN-BARRE SYNDROME: Primary | ICD-10-CM

## 2025-01-14 PROCEDURE — 96366 THER/PROPH/DIAG IV INF ADDON: CPT

## 2025-01-14 PROCEDURE — 25010000002 IMMUNE GLOBULIN (HUMAN) 40 GM/400ML SOLUTION: Performed by: PSYCHIATRY & NEUROLOGY

## 2025-01-14 PROCEDURE — 96365 THER/PROPH/DIAG IV INF INIT: CPT

## 2025-01-14 RX ORDER — SODIUM CHLORIDE 9 MG/ML
20 INJECTION, SOLUTION INTRAVENOUS ONCE
Status: DISCONTINUED | OUTPATIENT
Start: 2025-01-14 | End: 2025-01-15 | Stop reason: HOSPADM

## 2025-01-14 RX ORDER — SODIUM CHLORIDE 9 MG/ML
20 INJECTION, SOLUTION INTRAVENOUS ONCE
OUTPATIENT
Start: 2025-02-10

## 2025-01-14 RX ADMIN — IMMUNE GLOBULIN (HUMAN) 40 G: 10 INJECTION INTRAVENOUS; SUBCUTANEOUS at 08:31

## 2025-01-20 DIAGNOSIS — G61.0 GUILLAIN BARRÉ SYNDROME: ICD-10-CM

## 2025-01-20 DIAGNOSIS — R06.02 SHORTNESS OF BREATH: ICD-10-CM

## 2025-01-20 DIAGNOSIS — I10 ESSENTIAL HYPERTENSION: ICD-10-CM

## 2025-01-20 DIAGNOSIS — E78.2 MIXED HYPERLIPIDEMIA: ICD-10-CM

## 2025-01-21 RX ORDER — LOSARTAN POTASSIUM 25 MG/1
25 TABLET ORAL 2 TIMES DAILY
Qty: 180 TABLET | Refills: 0 | Status: SHIPPED | OUTPATIENT
Start: 2025-01-21

## 2025-02-07 DIAGNOSIS — I10 ESSENTIAL HYPERTENSION: ICD-10-CM

## 2025-02-07 RX ORDER — METOPROLOL SUCCINATE 100 MG/1
100 TABLET, EXTENDED RELEASE ORAL DAILY
Qty: 90 TABLET | Refills: 0 | Status: SHIPPED | OUTPATIENT
Start: 2025-02-07

## 2025-02-10 ENCOUNTER — HOSPITAL ENCOUNTER (OUTPATIENT)
Dept: ONCOLOGY | Facility: HOSPITAL | Age: 75
Discharge: HOME | End: 2025-02-10
Admitting: PSYCHIATRY & NEUROLOGY
Payer: MEDICARE

## 2025-02-10 VITALS
HEART RATE: 89 BPM | RESPIRATION RATE: 14 BRPM | DIASTOLIC BLOOD PRESSURE: 86 MMHG | SYSTOLIC BLOOD PRESSURE: 159 MMHG | HEIGHT: 72 IN | BODY MASS INDEX: 24.7 KG/M2 | OXYGEN SATURATION: 100 % | TEMPERATURE: 98.3 F

## 2025-02-10 DIAGNOSIS — G61.0 GUILLAIN-BARRE SYNDROME: Primary | ICD-10-CM

## 2025-02-10 PROCEDURE — 96366 THER/PROPH/DIAG IV INF ADDON: CPT

## 2025-02-10 PROCEDURE — 96365 THER/PROPH/DIAG IV INF INIT: CPT

## 2025-02-10 PROCEDURE — 25010000002 IMMUNE GLOBULIN (HUMAN) 40 GM/400ML SOLUTION: Performed by: PSYCHIATRY & NEUROLOGY

## 2025-02-10 RX ORDER — SODIUM CHLORIDE 9 MG/ML
20 INJECTION, SOLUTION INTRAVENOUS ONCE
Status: DISCONTINUED | OUTPATIENT
Start: 2025-02-10 | End: 2025-02-11 | Stop reason: HOSPADM

## 2025-02-10 RX ORDER — SODIUM CHLORIDE 9 MG/ML
20 INJECTION, SOLUTION INTRAVENOUS ONCE
Status: CANCELLED | OUTPATIENT
Start: 2025-02-11

## 2025-02-10 RX ADMIN — IMMUNE GLOBULIN (HUMAN) 40 G: 10 INJECTION INTRAVENOUS; SUBCUTANEOUS at 08:23

## 2025-02-11 ENCOUNTER — HOSPITAL ENCOUNTER (OUTPATIENT)
Dept: ONCOLOGY | Facility: HOSPITAL | Age: 75
Discharge: HOME | End: 2025-02-11
Admitting: PSYCHIATRY & NEUROLOGY
Payer: MEDICARE

## 2025-02-11 VITALS
BODY MASS INDEX: 24.46 KG/M2 | TEMPERATURE: 97.6 F | WEIGHT: 180.6 LBS | HEART RATE: 73 BPM | RESPIRATION RATE: 14 BRPM | SYSTOLIC BLOOD PRESSURE: 132 MMHG | DIASTOLIC BLOOD PRESSURE: 78 MMHG | HEIGHT: 72 IN | OXYGEN SATURATION: 97 %

## 2025-02-11 DIAGNOSIS — G61.0 GUILLAIN-BARRE SYNDROME: Primary | ICD-10-CM

## 2025-02-11 PROCEDURE — 96366 THER/PROPH/DIAG IV INF ADDON: CPT

## 2025-02-11 PROCEDURE — 96365 THER/PROPH/DIAG IV INF INIT: CPT

## 2025-02-11 PROCEDURE — 25010000002 IMMUNE GLOBULIN (HUMAN) 40 GM/400ML SOLUTION: Performed by: PSYCHIATRY & NEUROLOGY

## 2025-02-11 RX ORDER — SODIUM CHLORIDE 9 MG/ML
20 INJECTION, SOLUTION INTRAVENOUS ONCE
OUTPATIENT
Start: 2025-03-10

## 2025-02-11 RX ORDER — SODIUM CHLORIDE 9 MG/ML
20 INJECTION, SOLUTION INTRAVENOUS ONCE
Status: DISCONTINUED | OUTPATIENT
Start: 2025-02-11 | End: 2025-02-12 | Stop reason: HOSPADM

## 2025-02-11 RX ADMIN — IMMUNE GLOBULIN (HUMAN) 40 G: 10 INJECTION INTRAVENOUS; SUBCUTANEOUS at 08:27

## 2025-03-03 NOTE — PROGRESS NOTES
Subjective   Emanuel Alcantara is a 74 y.o. male.     Chief Complaint   Patient presents with    Hypertension    Hyperlipidemia    Numbness     Diagnosed with CIDP         Hypertension  This is a chronic problem. The current episode started more than 1 year ago. The problem is uncontrolled. Associated symptoms include anxiety. Pertinent negatives include no blurred vision, chest pain, headaches, malaise/fatigue, neck pain, palpitations, shortness of breath or sweats. There are no associated agents to hypertension. Risk factors for coronary artery disease include dyslipidemia, male gender, family history and stress. Current antihypertension treatment includes beta blockers. The current treatment provides moderate improvement. There are no compliance problems.    Hyperlipidemia  This is a chronic problem. The current episode started more than 1 year ago. Recent lipid tests were reviewed and are high. There are no known factors aggravating his hyperlipidemia. Associated symptoms include leg pain. Pertinent negatives include no chest pain or shortness of breath. Current antihyperlipidemic treatment includes statins. The current treatment provides mild improvement of lipids. There are no compliance problems.  Risk factors for coronary artery disease include male sex, hypertension and stress.            I personally reviewed and updated the patient's allergies, medications, problem list, and past medical, surgical, social, and family history. I have reviewed and confirmed the accuracy of the History of Present Illness and Review of Symptoms as documented by the MA/LPN/RN. Monty Henry MD    Family History   Problem Relation Age of Onset    Hypertension Mother     Osteoporosis Mother     Glaucoma Mother     Macular degeneration Mother     Heart failure Father     Hypertension Father     Obesity Father     Hypertension Sister     Prostate cancer Brother     Stomach cancer Maternal Grandfather     Diabetes Paternal  Grandmother     Pancreatic cancer Paternal Grandmother     Aneurysm Paternal Grandfather        Social History     Tobacco Use    Smoking status: Never     Passive exposure: Never    Smokeless tobacco: Never   Vaping Use    Vaping status: Never Used   Substance Use Topics    Alcohol use: Not Currently    Drug use: Never       No past surgical history on file.    Patient Active Problem List   Diagnosis    Allergic rhinitis    Benign prostatic hyperplasia    Mixed hyperlipidemia    Essential hypertension    Osteoarthritis    Prostatitis, chronic    Encounter for screening for malignant neoplasm of prostate    Balanitis    Direct inguinal hernia    Medicare annual wellness visit, subsequent    Nevus    Dysplastic nevus    Bilateral leg weakness    Bilateral arm weakness    Paresthesia of foot, bilateral    Paresthesia of hand, bilateral    Ataxia    Multiple falls    COVID-19 virus infection    Weakness    Cytokine release syndrome, grade 1    CIDP (chronic inflammatory demyelinating polyneuropathy)    Screening for colon cancer    Guillain-[place] syndrome    Irritable bowel syndrome with diarrhea         Current Outpatient Medications:     aspirin 81 MG EC tablet, Take 1 tablet by mouth 3 (Three) Times a Week. Every other day, Disp: , Rfl:     losartan (COZAAR) 25 MG tablet, Take 1 tablet by mouth twice daily, Disp: 180 tablet, Rfl: 0    metoprolol succinate XL (TOPROL-XL) 100 MG 24 hr tablet, Take 1 tablet by mouth once daily, Disp: 90 tablet, Rfl: 0    rosuvastatin (CRESTOR) 5 MG tablet, Take 1 tablet by mouth Daily. (Patient taking differently: Take 1 tablet by mouth Every Other Day.), Disp: 90 tablet, Rfl: 3    cloNIDine (CATAPRES) 0.1 MG tablet, Take 1 tablet by mouth Every 2 (Two) Hours As Needed (for systolic blood pressure greater than 160)., Disp: 90 tablet, Rfl: 3    dicyclomine (BENTYL) 20 MG tablet, Take 1 tablet by mouth 3 (Three) Times a Day As Needed for Abdominal Cramping. (Patient not taking: Reported  "on 3/5/2025), Disp: 90 tablet, Rfl: 5         Review of Systems   Constitutional:  Negative for chills, diaphoresis and malaise/fatigue.   HENT:  Negative for trouble swallowing and voice change.    Eyes:  Negative for blurred vision and visual disturbance.   Respiratory:  Negative for shortness of breath.    Cardiovascular:  Negative for chest pain and palpitations.   Gastrointestinal:  Negative for abdominal pain and nausea.   Endocrine: Negative for polydipsia and polyphagia.   Genitourinary:  Negative for hematuria.   Musculoskeletal:  Negative for neck pain and neck stiffness.   Skin:  Negative for color change and pallor.   Allergic/Immunologic: Negative for immunocompromised state.   Neurological:  Negative for seizures and syncope.   Hematological:  Negative for adenopathy.   Psychiatric/Behavioral:  Negative for hallucinations, sleep disturbance and suicidal ideas.        I have reviewed and confirmed the accuracy of the ROS as documented by the MA/LPN/RN Monty Henry MD      Objective   /92 (BP Location: Right arm, Patient Position: Sitting, Cuff Size: Adult)   Pulse 111   Temp 97.1 °F (36.2 °C) (Temporal)   Resp 18   Ht 182.9 cm (72\")   Wt 82.2 kg (181 lb 3.2 oz)   SpO2 97%   BMI 24.58 kg/m²   BP Readings from Last 3 Encounters:   03/05/25 152/92   02/11/25 132/78   02/10/25 159/86     Wt Readings from Last 3 Encounters:   03/05/25 82.2 kg (181 lb 3.2 oz)   02/11/25 81.9 kg (180 lb 9.6 oz)   01/14/25 82.6 kg (182 lb 1.6 oz)     Physical Exam  Constitutional:       Appearance: Normal appearance. He is well-developed. He is not diaphoretic.   HENT:      Head: Normocephalic and atraumatic.      Right Ear: Tympanic membrane, ear canal and external ear normal.      Left Ear: Tympanic membrane, ear canal and external ear normal.      Nose: Nose normal.      Mouth/Throat:      Mouth: Mucous membranes are moist.   Eyes:      General: Lids are normal.      Extraocular Movements: Extraocular " "movements intact.      Conjunctiva/sclera: Conjunctivae normal.      Pupils: Pupils are equal, round, and reactive to light.   Neck:      Thyroid: No thyromegaly.      Vascular: No carotid bruit or JVD.      Trachea: No tracheal deviation.   Cardiovascular:      Rate and Rhythm: Normal rate and regular rhythm.      Heart sounds: Normal heart sounds. No murmur heard.     No friction rub. No gallop.   Pulmonary:      Effort: Pulmonary effort is normal.      Breath sounds: Normal breath sounds. No stridor. No decreased breath sounds, wheezing or rales.   Abdominal:      General: Bowel sounds are normal. There is no distension.      Palpations: Abdomen is soft. There is no mass.      Tenderness: There is no abdominal tenderness. There is no guarding or rebound.      Hernia: No hernia is present.   Lymphadenopathy:      Head:      Right side of head: No submental, submandibular, tonsillar, preauricular, posterior auricular or occipital adenopathy.      Left side of head: No submental, submandibular, tonsillar, preauricular, posterior auricular or occipital adenopathy.      Cervical: No cervical adenopathy.   Skin:     General: Skin is warm and dry.      Coloration: Skin is not pale.   Neurological:      Mental Status: He is alert and oriented to person, place, and time.      Cranial Nerves: No cranial nerve deficit.      Sensory: No sensory deficit.      Motor: No tremor, abnormal muscle tone or seizure activity.      Coordination: Coordination normal.      Gait: Gait normal.      Deep Tendon Reflexes: Reflexes are normal and symmetric.         Data / Lab Results:    No results found for: \"HGBA1C\"  Lab Results   Component Value Date    Glucose 96 11/13/2024    Glucose 99 12/07/2021    Glucose, UA Negative 11/20/2024    Glucose, UA Negative 12/07/2021     Lab Results   Component Value Date     (H) 11/13/2024     (H) 09/23/2023     (H) 09/14/2022     Lab Results   Component Value Date    CHOL 232 (H) " "03/08/2019    CHOL 227 (H) 11/26/2018    CHOL 214 (H) 06/17/2016     Lab Results   Component Value Date    TRIG 76 11/13/2024    TRIG 84 09/23/2023    TRIG 99 09/14/2022     Lab Results   Component Value Date    HDL 48 11/13/2024    HDL 47 09/23/2023    HDL 47 09/14/2022     Lab Results   Component Value Date    PSA 2.1 11/13/2024    PSA 1.8 09/23/2023    PSA 1.3 09/14/2022     Lab Results   Component Value Date    WBC 6.7 11/13/2024    HGB 15.3 11/13/2024    HCT 48.7 11/13/2024    MCV 96 11/13/2024     11/13/2024     Lab Results   Component Value Date    TSH 5.480 (H) 11/13/2024      Lab Results   Component Value Date    GLUCOSE 96 11/13/2024    BUN 14 11/13/2024    CREATININE 0.96 11/13/2024    EGFRIFNONA 104 12/07/2021    EGFRIFAFRI 95 09/13/2021    BCR 15 11/13/2024    K 4.6 11/13/2024    CO2 26 11/13/2024    CALCIUM 9.8 11/13/2024    ALBUMIN 4.8 11/13/2024    AST 48 (H) 11/13/2024    ALT 40 11/13/2024     Lab Results   Component Value Date    SEDRATE 21 (H) 12/06/2021      Lab Results   Component Value Date    CRP 0.47 12/06/2021      No results found for: \"IRON\", \"TIBC\", \"FERRITIN\"   Lab Results   Component Value Date    XHSHSYMH06 1,324 (H) 12/06/2021          Assessment & Plan      Medications        Problem List         LOS      Health maintenance.  Doing well.  Neurology recommendations holding All vaccinations.  Tolerating coated baby aspirin every other day.  Discussed health maintenance, screening test, lifestyle modification.  Hypertension.  Improved today on metoprolol  mg daily, losartan 25 mg twice daily, home blood pressure results reviewed, with persistent lability add as needed clonidine.  Home blood pressure monitor results reviewed, tolerating Toprol-XL, Continu monitor blood pressure,  Remote history of benign cardiac stress testing,  repeat stress echo with hypertensive response to exercise, other wise benign 2023, AAA ultrasound benign 2023.  blood pressure better " controlled..  Carotid stenosis.  Mild, bilateral carotid Dopplers 2023.  Continue risk factor reduction.  Recheck 3 to 4 years.  Palpitations.  History of, much improved on atenolol, changed to Toprol..  Limit caffeine.  Hyperlipidemia.  Stabke today, LDL to 129, tolerating Crestor at 5 days/week, dors not tolerate further increase.  Discussed diet and exercise lifestyle to modification.  Discussed diet, exercise, lifestyle modification.   Direct inguinal hernia.  Small/nontender, asymptomatic currently.  Continue to monitor clinically.  Consider surgery referral if worsening symptoms.  Asymmetric abdominal fat.  He is worse today, LDL back to 132, concerned about a prominence of his abdominal wall which is a symmetric.  Benign exam today, asymptomatic.  Reassurance given.  Has had CT abdomen will get records.  Follow-up recheck.  Consider repeat imaging, surgery referral if worsening symptoms.  Colon screening.  Has had colonoscopy 2015, repeat was recommended in 10 years, will get the records.  Followed by [facility].  Screening for prostate cancer.  PSA benign.  Dysplastic nevus.  With severe atypia.  Right forearm, healing well status post resection, sutures removed today.  Margins clear but close, recommend further resection, dermatology referral scheduled.  Positive multiple nevi on exam, benign appearance today.  Sun protection discussed.  Follow-up for yearly skin exams.-Positive family history melanoma, father.  COVID-19 viral respiratory infection.  Improved/resolved currently, has completed quarantine.  Has not been vaccinated.  Proximal muscle weakness.  Improved today undergoing regular infusions, followed by neurology Dr. Wynn.  Status post benign MRI brain/C-spine/neurology eval inpatient.  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  Clinically improved today but having some persistent extremity numbness/weakness..  Rehabilitation exercises discussed.  Followed by neurology Dr. Wynn at ,  has had repeat work-up with brain imaging, LP this summer, neurology recommends that he does not take COVID-19 vaccine.  Followed by neurology Dr. Ross DDx includes Guillain-Barré versus COPD.  Consider steroid Rx, PT if persistent symptoms  Family history of prostate cancer.  His brother, recently diagnosed.  PSA benign, continue to monitor.  BPH.  Overall worse, tolerating saw palmetto, consider add Flomax.  Limit caffeine.  Diarrhea/cramping.  Likely flare IBS, restart Bentyl.  Check blood work/stool studies.  Recommend repeat colonoscopy gastroenterology referral scheduled.    Previous Exams:  PSA was 2.1 on 11/13/2024.   · Previous PSA was 1.8  on 9/23/2023.  US Carotid Dopplers was on 11/14/23 ordered by Dr Henry.    · Mild plaquing bilaterally. Degree of stenosis is estimated to be less than 50%.  Colonoscopy was on 3/17/2005 by Dr Mota.    · Diverticular disease in the sigmoid colon otherwise normal to the cecum  Stress Test was on 11/22/2023 ordered by Dr Henry.    ·  Normal stress echo with no significant echocardiographic evidence for myocardial ischemia.   ·  Left ventricular systolic function is normal. Left ventricular ejection fraction appears to be 61 - 65%.   · Left ventricular diastolic function is consistent with (grade I) impaired relaxation.   · Estimated right ventricular systolic pressure from tricuspid regurgitation is markedly elevated (>55 mmHg).  EKG was on 8/23/24 ordered by Dr Wall.    · Compared with previous ECG    · Similar to previous ECG   · Rhythm: sinus rhythm   · Clinical impression: normal ECG  US AAA was on 11/14/2023 ordered by Dr Henry.    · No evidence of abdominal aortic aneurysm    Diagnoses and all orders for this visit:    1. Essential hypertension (Primary)  -     cloNIDine (CATAPRES) 0.1 MG tablet; Take 1 tablet by mouth Every 2 (Two) Hours As Needed (for systolic blood pressure greater than 160).  Dispense: 90 tablet; Refill: 3    2. Mixed  hyperlipidemia    3. CIDP (chronic inflammatory demyelinating polyneuropathy)              Expected course, medications, and adverse effects discussed.  Call or return if worsening or persistent symptoms.  I wore protective equipment throughout this patient encounter including a mask, gloves, and eye protection.  Hand hygiene was performed before donning protective equipment and after removal when leaving the room. The complete contents of the Assessment and Plan and Data/Lab Results as documented above have been reviewed and addressed by myself with the patient today as part of an ongoing evaluation / treatment plan.  If some of the documentation has been copied from a previous note and is unchanged it indicates that this problem / plan has been assessed today but is stable from a previous visit and no changes have been recommended.

## 2025-03-05 ENCOUNTER — OFFICE VISIT (OUTPATIENT)
Dept: FAMILY MEDICINE CLINIC | Facility: CLINIC | Age: 75
End: 2025-03-05
Payer: MEDICARE

## 2025-03-05 VITALS
BODY MASS INDEX: 24.54 KG/M2 | HEART RATE: 111 BPM | DIASTOLIC BLOOD PRESSURE: 92 MMHG | SYSTOLIC BLOOD PRESSURE: 152 MMHG | HEIGHT: 72 IN | TEMPERATURE: 97.1 F | WEIGHT: 181.2 LBS | OXYGEN SATURATION: 97 % | RESPIRATION RATE: 18 BRPM

## 2025-03-05 DIAGNOSIS — Z12.5 SCREENING FOR MALIGNANT NEOPLASM OF PROSTATE: ICD-10-CM

## 2025-03-05 DIAGNOSIS — Z00.00 MEDICARE ANNUAL WELLNESS VISIT, SUBSEQUENT: ICD-10-CM

## 2025-03-05 DIAGNOSIS — E78.2 MIXED HYPERLIPIDEMIA: ICD-10-CM

## 2025-03-05 DIAGNOSIS — I10 ESSENTIAL HYPERTENSION: Primary | ICD-10-CM

## 2025-03-05 DIAGNOSIS — G61.81 CIDP (CHRONIC INFLAMMATORY DEMYELINATING POLYNEUROPATHY): ICD-10-CM

## 2025-03-05 RX ORDER — CLONIDINE HYDROCHLORIDE 0.1 MG/1
0.1 TABLET ORAL
Qty: 90 TABLET | Refills: 3 | Status: SHIPPED | OUTPATIENT
Start: 2025-03-05

## 2025-03-10 ENCOUNTER — HOSPITAL ENCOUNTER (OUTPATIENT)
Dept: ONCOLOGY | Facility: HOSPITAL | Age: 75
Discharge: HOME | End: 2025-03-10
Admitting: PSYCHIATRY & NEUROLOGY
Payer: MEDICARE

## 2025-03-10 VITALS
HEART RATE: 71 BPM | DIASTOLIC BLOOD PRESSURE: 87 MMHG | SYSTOLIC BLOOD PRESSURE: 136 MMHG | OXYGEN SATURATION: 98 % | RESPIRATION RATE: 16 BRPM | TEMPERATURE: 98.1 F

## 2025-03-10 DIAGNOSIS — G61.0 GUILLAIN-BARRE SYNDROME: Primary | ICD-10-CM

## 2025-03-10 PROCEDURE — 96365 THER/PROPH/DIAG IV INF INIT: CPT

## 2025-03-10 PROCEDURE — 25010000002 IMMUNE GLOBULIN (HUMAN) 40 GM/400ML SOLUTION: Performed by: PSYCHIATRY & NEUROLOGY

## 2025-03-10 PROCEDURE — 96366 THER/PROPH/DIAG IV INF ADDON: CPT

## 2025-03-10 RX ORDER — SODIUM CHLORIDE 9 MG/ML
20 INJECTION, SOLUTION INTRAVENOUS ONCE
Status: DISCONTINUED | OUTPATIENT
Start: 2025-03-10 | End: 2025-03-11 | Stop reason: HOSPADM

## 2025-03-10 RX ORDER — SODIUM CHLORIDE 9 MG/ML
20 INJECTION, SOLUTION INTRAVENOUS ONCE
Status: CANCELLED | OUTPATIENT
Start: 2025-03-11

## 2025-03-10 RX ADMIN — IMMUNE GLOBULIN (HUMAN) 40 G: 10 INJECTION INTRAVENOUS; SUBCUTANEOUS at 08:35

## 2025-03-11 ENCOUNTER — HOSPITAL ENCOUNTER (OUTPATIENT)
Dept: ONCOLOGY | Facility: HOSPITAL | Age: 75
Discharge: HOME | End: 2025-03-11
Admitting: PSYCHIATRY & NEUROLOGY
Payer: MEDICARE

## 2025-03-11 VITALS
WEIGHT: 182.4 LBS | SYSTOLIC BLOOD PRESSURE: 148 MMHG | HEIGHT: 72 IN | RESPIRATION RATE: 16 BRPM | DIASTOLIC BLOOD PRESSURE: 89 MMHG | TEMPERATURE: 97.9 F | BODY MASS INDEX: 24.71 KG/M2 | OXYGEN SATURATION: 100 % | HEART RATE: 66 BPM

## 2025-03-11 DIAGNOSIS — G61.0 GUILLAIN-BARRE SYNDROME: Primary | ICD-10-CM

## 2025-03-11 PROCEDURE — 25010000002 IMMUNE GLOBULIN (HUMAN) 40 GM/400ML SOLUTION: Performed by: PSYCHIATRY & NEUROLOGY

## 2025-03-11 PROCEDURE — 96366 THER/PROPH/DIAG IV INF ADDON: CPT

## 2025-03-11 PROCEDURE — 96365 THER/PROPH/DIAG IV INF INIT: CPT

## 2025-03-11 RX ORDER — SODIUM CHLORIDE 9 MG/ML
20 INJECTION, SOLUTION INTRAVENOUS ONCE
Status: CANCELLED | OUTPATIENT
Start: 2025-03-11

## 2025-03-11 RX ORDER — SODIUM CHLORIDE 9 MG/ML
20 INJECTION, SOLUTION INTRAVENOUS ONCE
Status: DISCONTINUED | OUTPATIENT
Start: 2025-03-11 | End: 2025-03-12 | Stop reason: HOSPADM

## 2025-03-11 RX ADMIN — IMMUNE GLOBULIN (HUMAN) 40 G: 10 INJECTION INTRAVENOUS; SUBCUTANEOUS at 08:18

## 2025-04-01 RX ORDER — SODIUM CHLORIDE 9 MG/ML
20 INJECTION, SOLUTION INTRAVENOUS ONCE
Status: CANCELLED | OUTPATIENT
Start: 2025-04-01

## 2025-04-07 ENCOUNTER — HOSPITAL ENCOUNTER (OUTPATIENT)
Dept: ONCOLOGY | Facility: HOSPITAL | Age: 75
Discharge: HOME | End: 2025-04-07
Payer: MEDICARE

## 2025-04-07 VITALS
HEIGHT: 72 IN | SYSTOLIC BLOOD PRESSURE: 150 MMHG | RESPIRATION RATE: 16 BRPM | HEART RATE: 54 BPM | TEMPERATURE: 98.3 F | OXYGEN SATURATION: 97 % | WEIGHT: 183.4 LBS | BODY MASS INDEX: 24.84 KG/M2 | DIASTOLIC BLOOD PRESSURE: 90 MMHG

## 2025-04-07 DIAGNOSIS — G61.0 GUILLAIN-BARRE SYNDROME: Primary | ICD-10-CM

## 2025-04-07 PROCEDURE — 25010000002 IMMUNE GLOBULIN (HUMAN) 40 GM/400ML SOLUTION: Performed by: PSYCHIATRY & NEUROLOGY

## 2025-04-07 PROCEDURE — 96366 THER/PROPH/DIAG IV INF ADDON: CPT

## 2025-04-07 PROCEDURE — 96365 THER/PROPH/DIAG IV INF INIT: CPT

## 2025-04-07 RX ORDER — SODIUM CHLORIDE 9 MG/ML
20 INJECTION, SOLUTION INTRAVENOUS ONCE
Status: CANCELLED | OUTPATIENT
Start: 2025-04-08

## 2025-04-07 RX ADMIN — IMMUNE GLOBULIN (HUMAN) 40 G: 10 INJECTION INTRAVENOUS; SUBCUTANEOUS at 08:44

## 2025-04-08 ENCOUNTER — HOSPITAL ENCOUNTER (OUTPATIENT)
Dept: ONCOLOGY | Facility: HOSPITAL | Age: 75
Discharge: HOME | End: 2025-04-08
Payer: MEDICARE

## 2025-04-08 VITALS — TEMPERATURE: 97.7 F | SYSTOLIC BLOOD PRESSURE: 169 MMHG | HEART RATE: 83 BPM | DIASTOLIC BLOOD PRESSURE: 82 MMHG

## 2025-04-08 DIAGNOSIS — G61.0 GUILLAIN-BARRE SYNDROME: Primary | ICD-10-CM

## 2025-04-08 PROCEDURE — 96366 THER/PROPH/DIAG IV INF ADDON: CPT

## 2025-04-08 PROCEDURE — 25010000002 IMMUNE GLOBULIN (HUMAN) 40 GM/400ML SOLUTION: Performed by: PSYCHIATRY & NEUROLOGY

## 2025-04-08 PROCEDURE — 25810000003 SODIUM CHLORIDE 0.9 % SOLUTION: Performed by: PSYCHIATRY & NEUROLOGY

## 2025-04-08 PROCEDURE — 96365 THER/PROPH/DIAG IV INF INIT: CPT

## 2025-04-08 RX ORDER — SODIUM CHLORIDE 9 MG/ML
20 INJECTION, SOLUTION INTRAVENOUS ONCE
Status: CANCELLED | OUTPATIENT
Start: 2025-05-05

## 2025-04-08 RX ORDER — SODIUM CHLORIDE 9 MG/ML
20 INJECTION, SOLUTION INTRAVENOUS ONCE
Status: COMPLETED | OUTPATIENT
Start: 2025-04-08 | End: 2025-04-08

## 2025-04-08 RX ADMIN — SODIUM CHLORIDE 20 ML/HR: 9 INJECTION, SOLUTION INTRAVENOUS at 08:40

## 2025-04-08 RX ADMIN — IMMUNE GLOBULIN (HUMAN) 40 G: 10 INJECTION INTRAVENOUS; SUBCUTANEOUS at 08:42

## 2025-04-08 NOTE — PROGRESS NOTES
Patient is here for D2 IVIG without complaints. Given and tolerated. AVS given, patient denies further needs today.

## 2025-04-21 DIAGNOSIS — G61.0 GUILLAIN BARRÉ SYNDROME: ICD-10-CM

## 2025-04-21 DIAGNOSIS — E78.2 MIXED HYPERLIPIDEMIA: ICD-10-CM

## 2025-04-21 DIAGNOSIS — R06.02 SHORTNESS OF BREATH: ICD-10-CM

## 2025-04-21 DIAGNOSIS — I10 ESSENTIAL HYPERTENSION: ICD-10-CM

## 2025-04-21 RX ORDER — LOSARTAN POTASSIUM 25 MG/1
25 TABLET ORAL 2 TIMES DAILY
Qty: 180 TABLET | Refills: 0 | Status: SHIPPED | OUTPATIENT
Start: 2025-04-21

## 2025-05-05 ENCOUNTER — HOSPITAL ENCOUNTER (OUTPATIENT)
Dept: ONCOLOGY | Facility: HOSPITAL | Age: 75
Discharge: HOME | End: 2025-05-05
Admitting: PSYCHIATRY & NEUROLOGY
Payer: MEDICARE

## 2025-05-05 VITALS
HEART RATE: 72 BPM | SYSTOLIC BLOOD PRESSURE: 149 MMHG | WEIGHT: 187 LBS | HEIGHT: 72 IN | BODY MASS INDEX: 25.33 KG/M2 | DIASTOLIC BLOOD PRESSURE: 88 MMHG

## 2025-05-05 DIAGNOSIS — G61.0 GUILLAIN-BARRE SYNDROME: Primary | ICD-10-CM

## 2025-05-05 PROCEDURE — 96366 THER/PROPH/DIAG IV INF ADDON: CPT

## 2025-05-05 PROCEDURE — 25010000002 IMMUNE GLOBULIN (HUMAN) 40 GM/400ML SOLUTION: Performed by: PSYCHIATRY & NEUROLOGY

## 2025-05-05 PROCEDURE — 96365 THER/PROPH/DIAG IV INF INIT: CPT

## 2025-05-05 RX ADMIN — IMMUNE GLOBULIN (HUMAN) 40 G: 10 INJECTION INTRAVENOUS; SUBCUTANEOUS at 08:37

## 2025-05-05 NOTE — PROGRESS NOTES
Referred patient to infusion room for Day 1 IVIG. VSS prior to and during infusion. Tolerated infusion. Printed AVS and he will return tomorrow for Day 2.

## 2025-05-06 ENCOUNTER — HOSPITAL ENCOUNTER (OUTPATIENT)
Dept: ONCOLOGY | Facility: HOSPITAL | Age: 75
Discharge: HOME | End: 2025-05-06
Admitting: PSYCHIATRY & NEUROLOGY
Payer: MEDICARE

## 2025-05-06 VITALS
HEART RATE: 68 BPM | HEIGHT: 72 IN | SYSTOLIC BLOOD PRESSURE: 149 MMHG | WEIGHT: 185 LBS | RESPIRATION RATE: 18 BRPM | DIASTOLIC BLOOD PRESSURE: 83 MMHG | BODY MASS INDEX: 25.06 KG/M2 | OXYGEN SATURATION: 100 % | TEMPERATURE: 97.7 F

## 2025-05-06 DIAGNOSIS — G61.0 GUILLAIN-BARRE SYNDROME: Primary | ICD-10-CM

## 2025-05-06 PROCEDURE — 96365 THER/PROPH/DIAG IV INF INIT: CPT

## 2025-05-06 PROCEDURE — 25010000002 IMMUNE GLOBULIN (HUMAN) 40 GM/400ML SOLUTION: Performed by: PSYCHIATRY & NEUROLOGY

## 2025-05-06 PROCEDURE — 96366 THER/PROPH/DIAG IV INF ADDON: CPT

## 2025-05-06 RX ADMIN — IMMUNE GLOBULIN (HUMAN) 40 G: 10 INJECTION INTRAVENOUS; SUBCUTANEOUS at 09:04

## 2025-05-07 DIAGNOSIS — I10 ESSENTIAL HYPERTENSION: ICD-10-CM

## 2025-05-07 RX ORDER — METOPROLOL SUCCINATE 100 MG/1
100 TABLET, EXTENDED RELEASE ORAL DAILY
Qty: 90 TABLET | Refills: 0 | Status: SHIPPED | OUTPATIENT
Start: 2025-05-07

## 2025-05-15 NOTE — PROGRESS NOTES
Date of Office Visit: 2025  Encounter Provider: Dr. Lei Wall  Place of Service: [facility] CARDIOLOGY [place]  Patient Name: Emanuel Alcantara  :1950  Monty Henry MD    Chief Complaint   Patient presents with    Hypertension    Hyperlipidemia    Follow-up     History of Present Illness    I am pleased to see Mr. Alcantara in my office today as a as a follow-up    As you know, patient is 74 years old white gentleman whose past medical history significant for hypertension, hyperlipidemia, Guillain-Barré syndrome who came today for follow-up.    In , patient was diagnosed with Guillain-Barré syndrome and developed significant weakness.  However patient improved and weakness got better with physical therapy.  Patient did not require immunotherapy or immunoglobulins or plasmapheresis.  Since then, patient developed labile hypertension.  In 2023, patient underwent stress test in which he walked for total of 3 minutes and 45 minutes and showed no ischemia.  Echocardiographic images showed no wall motion abnormality.  No significant valvular heart disease noted.    Patient came today for follow-up.  Patient brought the blood pressure log book and his blood pressure is running high.  Patient denies any symptom of chest pain or tightness or heaviness.  No orthopnea PND no syncope or presyncope.  No leg edema noted.    EKG showed normal sinus rhythm    Patient blood pressure is elevated I would recommend that patient should proceed with clonidine 0.1 mg at bedtime.  Most of the blood pressures are high during early morning hours.  Continue losartan  Past Medical History:   Diagnosis Date    Guillain-[place] 2022    Neurologist, Dr. John Jeong    Hyperlipidemia     Hypertension     IBS (irritable bowel syndrome)          History reviewed. No pertinent surgical history.        Current Outpatient Medications:     aspirin 81 MG EC tablet, Take 1 tablet by mouth 3 (Three) Times a Week. Every  other day, Disp: , Rfl:     cloNIDine (CATAPRES) 0.1 MG tablet, Take 1 tablet by mouth Daily., Disp: 90 tablet, Rfl: 1    losartan (COZAAR) 25 MG tablet, Take 1 tablet by mouth twice daily, Disp: 180 tablet, Rfl: 0    metoprolol succinate XL (TOPROL-XL) 100 MG 24 hr tablet, Take 1 tablet by mouth once daily, Disp: 90 tablet, Rfl: 0    rosuvastatin (CRESTOR) 5 MG tablet, Take 1 tablet by mouth Daily. (Patient taking differently: Take 1 tablet by mouth Every Other Day.), Disp: 90 tablet, Rfl: 3      Social History     Socioeconomic History    Marital status:    Tobacco Use    Smoking status: Never     Passive exposure: Never    Smokeless tobacco: Never   Vaping Use    Vaping status: Never Used   Substance and Sexual Activity    Alcohol use: Not Currently    Drug use: Never    Sexual activity: Defer         Review of Systems   Constitutional: Negative for chills and fever.   HENT:  Negative for ear discharge and nosebleeds.    Eyes:  Negative for discharge and redness.   Cardiovascular:  Negative for chest pain, orthopnea, palpitations, paroxysmal nocturnal dyspnea and syncope.   Respiratory:  Negative for cough, shortness of breath and wheezing.    Endocrine: Negative for heat intolerance.   Skin:  Negative for rash.   Musculoskeletal:  Negative for arthritis and myalgias.   Gastrointestinal:  Negative for abdominal pain, melena, nausea and vomiting.   Genitourinary:  Negative for dysuria and hematuria.   Neurological:  Negative for dizziness, light-headedness, numbness and tremors.   Psychiatric/Behavioral:  Negative for depression. The patient is not nervous/anxious.        Procedures      ECG 12 Lead    Date/Time: 5/16/2025 9:27 AM  Performed by: Lei Wall MD    Authorized by: Lei Wall MD  Comparison: compared with previous ECG   Similar to previous ECG  Rhythm: sinus rhythm    Clinical impression: normal ECG          ECG 12 Lead    (Results Pending)           Objective:    /95 (BP Location:  "Right arm, Patient Position: Sitting, Cuff Size: Large Adult)   Pulse 79   Resp 16   Ht 182 cm (71.65\")   Wt 83.9 kg (185 lb)   SpO2 98%   BMI 25.33 kg/m²         Constitutional:       Appearance: Well-developed.   Eyes:      General: No scleral icterus.        Right eye: No discharge.   HENT:      Head: Normocephalic and atraumatic.   Neck:      Thyroid: No thyromegaly.      Lymphadenopathy: No cervical adenopathy.   Pulmonary:      Effort: Pulmonary effort is normal. No respiratory distress.      Breath sounds: Normal breath sounds. No wheezing. No rales.   Cardiovascular:      Normal rate. Regular rhythm.      No gallop.    Edema:     Peripheral edema absent.   Abdominal:      Tenderness: There is no abdominal tenderness.   Skin:     Findings: No erythema or rash.   Neurological:      Mental Status: Alert and oriented to person, place, and time.             Assessment:       Diagnosis Plan   1. Mixed hyperlipidemia  ECG 12 Lead      2. Essential hypertension  ECG 12 Lead    cloNIDine (CATAPRES) 0.1 MG tablet               Plan:       MDM:    1.  Hypertension:    Start clonidine 0.1 mg daily    2.  Mixed hyperlipidemia:    Patient is on  Crestor repeat lipid panel testing  "

## 2025-05-16 ENCOUNTER — OFFICE VISIT (OUTPATIENT)
Dept: CARDIOLOGY | Facility: CLINIC | Age: 75
End: 2025-05-16
Payer: MEDICARE

## 2025-05-16 VITALS
RESPIRATION RATE: 16 BRPM | WEIGHT: 185 LBS | HEART RATE: 79 BPM | SYSTOLIC BLOOD PRESSURE: 164 MMHG | HEIGHT: 72 IN | OXYGEN SATURATION: 98 % | DIASTOLIC BLOOD PRESSURE: 95 MMHG | BODY MASS INDEX: 25.06 KG/M2

## 2025-05-16 DIAGNOSIS — I10 ESSENTIAL HYPERTENSION: ICD-10-CM

## 2025-05-16 DIAGNOSIS — E78.2 MIXED HYPERLIPIDEMIA: Primary | ICD-10-CM

## 2025-05-16 PROCEDURE — 3077F SYST BP >= 140 MM HG: CPT | Performed by: INTERNAL MEDICINE

## 2025-05-16 PROCEDURE — 93000 ELECTROCARDIOGRAM COMPLETE: CPT | Performed by: INTERNAL MEDICINE

## 2025-05-16 PROCEDURE — 99213 OFFICE O/P EST LOW 20 MIN: CPT | Performed by: INTERNAL MEDICINE

## 2025-05-16 PROCEDURE — 3080F DIAST BP >= 90 MM HG: CPT | Performed by: INTERNAL MEDICINE

## 2025-05-16 PROCEDURE — 1160F RVW MEDS BY RX/DR IN RCRD: CPT | Performed by: INTERNAL MEDICINE

## 2025-05-16 PROCEDURE — 1159F MED LIST DOCD IN RCRD: CPT | Performed by: INTERNAL MEDICINE

## 2025-05-16 RX ORDER — CLONIDINE HYDROCHLORIDE 0.1 MG/1
0.1 TABLET ORAL DAILY
Qty: 90 TABLET | Refills: 1 | Status: SHIPPED | OUTPATIENT
Start: 2025-05-16

## 2025-05-16 RX ORDER — SODIUM CHLORIDE 9 MG/ML
20 INJECTION, SOLUTION INTRAVENOUS ONCE
OUTPATIENT
Start: 2025-05-16

## 2025-06-02 ENCOUNTER — HOSPITAL ENCOUNTER (OUTPATIENT)
Dept: ONCOLOGY | Facility: HOSPITAL | Age: 75
Discharge: HOME | End: 2025-06-02
Admitting: PSYCHIATRY & NEUROLOGY
Payer: MEDICARE

## 2025-06-02 VITALS
RESPIRATION RATE: 16 BRPM | WEIGHT: 185.4 LBS | HEART RATE: 68 BPM | TEMPERATURE: 98 F | SYSTOLIC BLOOD PRESSURE: 136 MMHG | HEIGHT: 72 IN | DIASTOLIC BLOOD PRESSURE: 86 MMHG | BODY MASS INDEX: 25.11 KG/M2

## 2025-06-02 DIAGNOSIS — G61.0 GUILLAIN-BARRE SYNDROME: Primary | ICD-10-CM

## 2025-06-02 PROCEDURE — 25810000003 SODIUM CHLORIDE 0.9 % SOLUTION: Performed by: PSYCHIATRY & NEUROLOGY

## 2025-06-02 PROCEDURE — 96366 THER/PROPH/DIAG IV INF ADDON: CPT

## 2025-06-02 PROCEDURE — 25010000002 IMMUNE GLOBULIN (HUMAN) 40 GM/400ML SOLUTION: Performed by: PSYCHIATRY & NEUROLOGY

## 2025-06-02 PROCEDURE — 96365 THER/PROPH/DIAG IV INF INIT: CPT

## 2025-06-02 RX ORDER — SODIUM CHLORIDE 9 MG/ML
20 INJECTION, SOLUTION INTRAVENOUS ONCE
Status: COMPLETED | OUTPATIENT
Start: 2025-06-02 | End: 2025-06-02

## 2025-06-02 RX ORDER — SODIUM CHLORIDE 9 MG/ML
20 INJECTION, SOLUTION INTRAVENOUS ONCE
Status: CANCELLED | OUTPATIENT
Start: 2025-06-03

## 2025-06-02 RX ADMIN — SODIUM CHLORIDE 20 ML/HR: 9 INJECTION, SOLUTION INTRAVENOUS at 11:14

## 2025-06-02 RX ADMIN — IMMUNE GLOBULIN (HUMAN) 40 G: 10 INJECTION INTRAVENOUS; SUBCUTANEOUS at 11:14

## 2025-06-02 NOTE — PROGRESS NOTES
Pt here for IVIG. PIV right arm. Pt's bp elevated. Pt stated that he forgot to take his extra dose of BP med. Pt is going to sit for a bit and then recheck. Treatment started after BP dropped. Treatment infused as ordered. Pt rtc on 6/3 at 0830.

## 2025-06-03 ENCOUNTER — HOSPITAL ENCOUNTER (OUTPATIENT)
Dept: ONCOLOGY | Facility: HOSPITAL | Age: 75
Discharge: HOME | End: 2025-06-03
Admitting: PSYCHIATRY & NEUROLOGY
Payer: MEDICARE

## 2025-06-03 VITALS
SYSTOLIC BLOOD PRESSURE: 115 MMHG | OXYGEN SATURATION: 98 % | WEIGHT: 184.6 LBS | RESPIRATION RATE: 16 BRPM | TEMPERATURE: 97.6 F | DIASTOLIC BLOOD PRESSURE: 81 MMHG | HEIGHT: 72 IN | BODY MASS INDEX: 25 KG/M2 | HEART RATE: 69 BPM

## 2025-06-03 DIAGNOSIS — G61.0 GUILLAIN-BARRE SYNDROME: Primary | ICD-10-CM

## 2025-06-03 PROCEDURE — 96365 THER/PROPH/DIAG IV INF INIT: CPT

## 2025-06-03 PROCEDURE — 25010000002 IMMUNE GLOBULIN (HUMAN) 40 GM/400ML SOLUTION: Performed by: PSYCHIATRY & NEUROLOGY

## 2025-06-03 PROCEDURE — 96366 THER/PROPH/DIAG IV INF ADDON: CPT

## 2025-06-03 RX ADMIN — IMMUNE GLOBULIN (HUMAN) 40 G: 10 INJECTION INTRAVENOUS; SUBCUTANEOUS at 09:15

## 2025-06-10 DIAGNOSIS — E78.2 MIXED HYPERLIPIDEMIA: ICD-10-CM

## 2025-06-10 RX ORDER — ROSUVASTATIN CALCIUM 5 MG/1
5 TABLET, COATED ORAL DAILY
Qty: 90 TABLET | Refills: 3 | Status: SHIPPED | OUTPATIENT
Start: 2025-06-10

## 2025-06-20 ENCOUNTER — OFFICE (AMBULATORY)
Dept: URBAN - METROPOLITAN AREA PATHOLOGY 19 | Facility: PATHOLOGY | Age: 75
End: 2025-06-20
Payer: COMMERCIAL

## 2025-06-20 ENCOUNTER — ON CAMPUS - OUTPATIENT (AMBULATORY)
Dept: URBAN - METROPOLITAN AREA HOSPITAL 2 | Facility: HOSPITAL | Age: 75
End: 2025-06-20
Payer: MEDICARE

## 2025-06-20 VITALS
OXYGEN SATURATION: 98 % | SYSTOLIC BLOOD PRESSURE: 100 MMHG | HEART RATE: 80 BPM | HEART RATE: 91 BPM | RESPIRATION RATE: 15 BRPM | SYSTOLIC BLOOD PRESSURE: 105 MMHG | RESPIRATION RATE: 17 BRPM | DIASTOLIC BLOOD PRESSURE: 59 MMHG | HEIGHT: 72 IN | DIASTOLIC BLOOD PRESSURE: 53 MMHG | DIASTOLIC BLOOD PRESSURE: 119 MMHG | OXYGEN SATURATION: 99 % | DIASTOLIC BLOOD PRESSURE: 68 MMHG | OXYGEN SATURATION: 97 % | DIASTOLIC BLOOD PRESSURE: 52 MMHG | SYSTOLIC BLOOD PRESSURE: 174 MMHG | SYSTOLIC BLOOD PRESSURE: 111 MMHG | OXYGEN SATURATION: 100 % | HEART RATE: 87 BPM | DIASTOLIC BLOOD PRESSURE: 65 MMHG | HEART RATE: 89 BPM | SYSTOLIC BLOOD PRESSURE: 88 MMHG | DIASTOLIC BLOOD PRESSURE: 75 MMHG | RESPIRATION RATE: 18 BRPM | TEMPERATURE: 97.5 F | SYSTOLIC BLOOD PRESSURE: 95 MMHG | HEART RATE: 82 BPM | DIASTOLIC BLOOD PRESSURE: 55 MMHG | SYSTOLIC BLOOD PRESSURE: 176 MMHG | HEART RATE: 76 BPM | DIASTOLIC BLOOD PRESSURE: 92 MMHG | WEIGHT: 178 LBS | SYSTOLIC BLOOD PRESSURE: 98 MMHG | HEART RATE: 77 BPM | RESPIRATION RATE: 16 BRPM | SYSTOLIC BLOOD PRESSURE: 92 MMHG

## 2025-06-20 DIAGNOSIS — K57.30 DIVERTICULOSIS OF LARGE INTESTINE WITHOUT PERFORATION OR ABS: ICD-10-CM

## 2025-06-20 DIAGNOSIS — K64.0 FIRST DEGREE HEMORRHOIDS: ICD-10-CM

## 2025-06-20 DIAGNOSIS — Z12.11 ENCOUNTER FOR SCREENING FOR MALIGNANT NEOPLASM OF COLON: ICD-10-CM

## 2025-06-20 DIAGNOSIS — D12.0 BENIGN NEOPLASM OF CECUM: ICD-10-CM

## 2025-06-20 PROBLEM — K63.5 POLYP OF COLON: Status: ACTIVE | Noted: 2025-06-20

## 2025-06-20 LAB
GI HISTOLOGY: A. CECUM: (no result)
GI HISTOLOGY: PDF REPORT: (no result)

## 2025-06-20 PROCEDURE — 45385 COLONOSCOPY W/LESION REMOVAL: CPT | Mod: PT | Performed by: INTERNAL MEDICINE

## 2025-06-30 ENCOUNTER — HOSPITAL ENCOUNTER (OUTPATIENT)
Dept: ONCOLOGY | Facility: HOSPITAL | Age: 75
Discharge: HOME | End: 2025-06-30
Admitting: PSYCHIATRY & NEUROLOGY
Payer: MEDICARE

## 2025-06-30 VITALS
SYSTOLIC BLOOD PRESSURE: 145 MMHG | RESPIRATION RATE: 16 BRPM | OXYGEN SATURATION: 96 % | WEIGHT: 182.9 LBS | TEMPERATURE: 98 F | HEIGHT: 72 IN | DIASTOLIC BLOOD PRESSURE: 82 MMHG | BODY MASS INDEX: 24.77 KG/M2 | HEART RATE: 73 BPM

## 2025-06-30 DIAGNOSIS — G61.0 GUILLAIN-BARRE SYNDROME: Primary | ICD-10-CM

## 2025-06-30 PROCEDURE — 96365 THER/PROPH/DIAG IV INF INIT: CPT

## 2025-06-30 PROCEDURE — 96366 THER/PROPH/DIAG IV INF ADDON: CPT

## 2025-06-30 PROCEDURE — 25010000002 IMMUNE GLOBULIN (HUMAN) 40 GM/400ML SOLUTION: Performed by: PSYCHIATRY & NEUROLOGY

## 2025-06-30 RX ADMIN — IMMUNE GLOBULIN (HUMAN) 40 G: 10 INJECTION INTRAVENOUS; SUBCUTANEOUS at 08:59

## 2025-06-30 NOTE — PROGRESS NOTES
Patient in clinic for D1 IVIG. PIV placed with good blood return. Patient has HTN; blood pressure was increasing at the beginning of the infusion. Patient was encouraged to take his PRN blood pressure medication. Blood pressure monitored but able to continue infusion. Patient tolerated. Discharged with AVS.

## 2025-07-01 ENCOUNTER — HOSPITAL ENCOUNTER (OUTPATIENT)
Dept: ONCOLOGY | Facility: HOSPITAL | Age: 75
Discharge: HOME | End: 2025-07-01
Admitting: PSYCHIATRY & NEUROLOGY
Payer: MEDICARE

## 2025-07-01 VITALS
BODY MASS INDEX: 24.9 KG/M2 | TEMPERATURE: 97.6 F | DIASTOLIC BLOOD PRESSURE: 80 MMHG | SYSTOLIC BLOOD PRESSURE: 151 MMHG | HEART RATE: 79 BPM | WEIGHT: 183.6 LBS

## 2025-07-01 DIAGNOSIS — G61.0 GUILLAIN-BARRE SYNDROME: Primary | ICD-10-CM

## 2025-07-01 PROCEDURE — 96366 THER/PROPH/DIAG IV INF ADDON: CPT

## 2025-07-01 PROCEDURE — 25010000002 IMMUNE GLOBULIN (HUMAN) 40 GM/400ML SOLUTION: Performed by: PSYCHIATRY & NEUROLOGY

## 2025-07-01 PROCEDURE — 96365 THER/PROPH/DIAG IV INF INIT: CPT

## 2025-07-01 RX ADMIN — IMMUNE GLOBULIN (HUMAN) 40 G: 10 INJECTION INTRAVENOUS; SUBCUTANEOUS at 08:48

## 2025-07-01 NOTE — PROGRESS NOTES
Pt here for IVIG, as ordered by Dr Wynn. Pt's bp elevated initially, and he took his prn bp med. Bp decreased as treatment given. Pt tolerated today's treatment well. He denied needing copy of AVS as he received one yesterday.

## 2025-07-14 DIAGNOSIS — R06.02 SHORTNESS OF BREATH: ICD-10-CM

## 2025-07-14 DIAGNOSIS — E78.2 MIXED HYPERLIPIDEMIA: ICD-10-CM

## 2025-07-14 DIAGNOSIS — I10 ESSENTIAL HYPERTENSION: ICD-10-CM

## 2025-07-14 DIAGNOSIS — G61.0 GUILLAIN BARRÉ SYNDROME: ICD-10-CM

## 2025-07-14 RX ORDER — LOSARTAN POTASSIUM 25 MG/1
25 TABLET ORAL 2 TIMES DAILY
Qty: 180 TABLET | Refills: 0 | Status: SHIPPED | OUTPATIENT
Start: 2025-07-14

## 2025-07-28 ENCOUNTER — HOSPITAL ENCOUNTER (OUTPATIENT)
Dept: ONCOLOGY | Facility: HOSPITAL | Age: 75
Discharge: HOME | End: 2025-07-28
Admitting: PSYCHIATRY & NEUROLOGY
Payer: MEDICARE

## 2025-07-28 VITALS
BODY MASS INDEX: 25.26 KG/M2 | HEIGHT: 72 IN | HEART RATE: 65 BPM | WEIGHT: 186.5 LBS | SYSTOLIC BLOOD PRESSURE: 133 MMHG | RESPIRATION RATE: 18 BRPM | TEMPERATURE: 97.5 F | OXYGEN SATURATION: 97 % | DIASTOLIC BLOOD PRESSURE: 83 MMHG

## 2025-07-28 DIAGNOSIS — G61.0 GUILLAIN-BARRE SYNDROME: Primary | ICD-10-CM

## 2025-07-28 PROCEDURE — 25010000002 IMMUNE GLOBULIN (HUMAN) 40 GM/400ML SOLUTION: Performed by: PSYCHIATRY & NEUROLOGY

## 2025-07-28 PROCEDURE — 96366 THER/PROPH/DIAG IV INF ADDON: CPT

## 2025-07-28 PROCEDURE — 96365 THER/PROPH/DIAG IV INF INIT: CPT

## 2025-07-28 RX ADMIN — IMMUNE GLOBULIN (HUMAN) 40 G: 10 INJECTION INTRAVENOUS; SUBCUTANEOUS at 08:50

## 2025-07-28 NOTE — PROGRESS NOTES
Pt here for IVIG as per Dr Wynn, pt denies problems/issues.    peripheral IV started with blood return noted and treatment given per MAR.  Pt tolerated well and pt has appt for 7/29/25 infusion

## 2025-07-29 ENCOUNTER — HOSPITAL ENCOUNTER (OUTPATIENT)
Dept: ONCOLOGY | Facility: HOSPITAL | Age: 75
Discharge: HOME | End: 2025-07-29
Admitting: PSYCHIATRY & NEUROLOGY
Payer: MEDICARE

## 2025-07-29 VITALS
SYSTOLIC BLOOD PRESSURE: 130 MMHG | HEART RATE: 56 BPM | OXYGEN SATURATION: 98 % | DIASTOLIC BLOOD PRESSURE: 78 MMHG | TEMPERATURE: 97.5 F | RESPIRATION RATE: 16 BRPM

## 2025-07-29 DIAGNOSIS — G61.0 GUILLAIN-BARRE SYNDROME: Primary | ICD-10-CM

## 2025-07-29 PROCEDURE — 25010000002 IMMUNE GLOBULIN (HUMAN) 40 GM/400ML SOLUTION: Performed by: PSYCHIATRY & NEUROLOGY

## 2025-07-29 PROCEDURE — 96365 THER/PROPH/DIAG IV INF INIT: CPT

## 2025-07-29 PROCEDURE — 96366 THER/PROPH/DIAG IV INF ADDON: CPT

## 2025-07-29 RX ADMIN — IMMUNE GLOBULIN (HUMAN) 40 G: 10 INJECTION INTRAVENOUS; SUBCUTANEOUS at 08:59

## 2025-08-08 DIAGNOSIS — I10 ESSENTIAL HYPERTENSION: ICD-10-CM

## 2025-08-08 RX ORDER — METOPROLOL SUCCINATE 100 MG/1
100 TABLET, EXTENDED RELEASE ORAL DAILY
Qty: 90 TABLET | Refills: 0 | Status: SHIPPED | OUTPATIENT
Start: 2025-08-08

## 2025-08-25 ENCOUNTER — HOSPITAL ENCOUNTER (OUTPATIENT)
Dept: ONCOLOGY | Facility: HOSPITAL | Age: 75
Discharge: HOME | End: 2025-08-25
Admitting: PSYCHIATRY & NEUROLOGY
Payer: MEDICARE

## 2025-08-25 VITALS — DIASTOLIC BLOOD PRESSURE: 91 MMHG | SYSTOLIC BLOOD PRESSURE: 169 MMHG | HEART RATE: 69 BPM | TEMPERATURE: 97.7 F

## 2025-08-25 DIAGNOSIS — G61.0 GUILLAIN-BARRE SYNDROME: Primary | ICD-10-CM

## 2025-08-25 PROCEDURE — 96365 THER/PROPH/DIAG IV INF INIT: CPT

## 2025-08-25 PROCEDURE — 96366 THER/PROPH/DIAG IV INF ADDON: CPT

## 2025-08-25 PROCEDURE — 25010000002 IMMUNE GLOBULIN (HUMAN) 40 GM/400ML SOLUTION: Performed by: PSYCHIATRY & NEUROLOGY

## 2025-08-25 RX ADMIN — IMMUNE GLOBULIN (HUMAN) 40 G: 10 INJECTION INTRAVENOUS; SUBCUTANEOUS at 08:47

## 2025-08-26 ENCOUNTER — HOSPITAL ENCOUNTER (OUTPATIENT)
Dept: ONCOLOGY | Facility: HOSPITAL | Age: 75
Discharge: HOME | End: 2025-08-26
Admitting: PSYCHIATRY & NEUROLOGY
Payer: MEDICARE

## 2025-08-26 VITALS
BODY MASS INDEX: 25.22 KG/M2 | DIASTOLIC BLOOD PRESSURE: 83 MMHG | TEMPERATURE: 98 F | HEART RATE: 62 BPM | SYSTOLIC BLOOD PRESSURE: 142 MMHG | HEIGHT: 72 IN | WEIGHT: 186.2 LBS | OXYGEN SATURATION: 98 %

## 2025-08-26 DIAGNOSIS — G61.0 GUILLAIN-BARRE SYNDROME: Primary | ICD-10-CM

## 2025-08-26 PROCEDURE — 96365 THER/PROPH/DIAG IV INF INIT: CPT

## 2025-08-26 PROCEDURE — 25010000002 IMMUNE GLOBULIN (HUMAN) 40 GM/400ML SOLUTION: Performed by: PSYCHIATRY & NEUROLOGY

## 2025-08-26 PROCEDURE — 96366 THER/PROPH/DIAG IV INF ADDON: CPT

## 2025-08-26 RX ADMIN — IMMUNE GLOBULIN (HUMAN) 40 G: 10 INJECTION INTRAVENOUS; SUBCUTANEOUS at 08:36
